# Patient Record
Sex: MALE | Race: WHITE | Employment: OTHER | URBAN - METROPOLITAN AREA
[De-identification: names, ages, dates, MRNs, and addresses within clinical notes are randomized per-mention and may not be internally consistent; named-entity substitution may affect disease eponyms.]

---

## 2017-05-17 ENCOUNTER — HOSPITAL ENCOUNTER (INPATIENT)
Age: 62
LOS: 6 days | Discharge: NURSING, REHAB OR LONG-TERM CARE | DRG: 603 | End: 2017-05-24
Attending: EMERGENCY MEDICINE | Admitting: HOSPITALIST
Payer: MEDICARE

## 2017-05-17 DIAGNOSIS — L03.116 CELLULITIS OF LEFT LEG: Primary | ICD-10-CM

## 2017-05-17 DIAGNOSIS — L03.311 CELLULITIS, ABDOMINAL WALL: ICD-10-CM

## 2017-05-17 DIAGNOSIS — L03.115 CELLULITIS OF RIGHT LEG: ICD-10-CM

## 2017-05-17 DIAGNOSIS — Z79.01 ANTICOAGULATED ON COUMADIN: ICD-10-CM

## 2017-05-17 DIAGNOSIS — R29.6 FREQUENT FALLS: ICD-10-CM

## 2017-05-17 DIAGNOSIS — I48.20 CHRONIC ATRIAL FIBRILLATION (HCC): ICD-10-CM

## 2017-05-17 PROCEDURE — 82550 ASSAY OF CK (CPK): CPT | Performed by: EMERGENCY MEDICINE

## 2017-05-17 PROCEDURE — 83880 ASSAY OF NATRIURETIC PEPTIDE: CPT | Performed by: EMERGENCY MEDICINE

## 2017-05-17 PROCEDURE — 51702 INSERT TEMP BLADDER CATH: CPT

## 2017-05-17 PROCEDURE — 99285 EMERGENCY DEPT VISIT HI MDM: CPT

## 2017-05-17 PROCEDURE — 85610 PROTHROMBIN TIME: CPT | Performed by: EMERGENCY MEDICINE

## 2017-05-17 PROCEDURE — 87040 BLOOD CULTURE FOR BACTERIA: CPT | Performed by: EMERGENCY MEDICINE

## 2017-05-17 PROCEDURE — 85025 COMPLETE CBC W/AUTO DIFF WBC: CPT | Performed by: EMERGENCY MEDICINE

## 2017-05-17 PROCEDURE — 96361 HYDRATE IV INFUSION ADD-ON: CPT

## 2017-05-17 PROCEDURE — 85730 THROMBOPLASTIN TIME PARTIAL: CPT | Performed by: EMERGENCY MEDICINE

## 2017-05-17 PROCEDURE — 96366 THER/PROPH/DIAG IV INF ADDON: CPT

## 2017-05-17 PROCEDURE — 83605 ASSAY OF LACTIC ACID: CPT

## 2017-05-17 PROCEDURE — 83735 ASSAY OF MAGNESIUM: CPT | Performed by: EMERGENCY MEDICINE

## 2017-05-17 PROCEDURE — 80053 COMPREHEN METABOLIC PANEL: CPT | Performed by: EMERGENCY MEDICINE

## 2017-05-17 PROCEDURE — 96365 THER/PROPH/DIAG IV INF INIT: CPT

## 2017-05-17 NOTE — IP AVS SNAPSHOT
Current Discharge Medication List  
  
START taking these medications Dose & Instructions Dispensing Information Comments Morning Noon Evening Bedtime  
 trimethoprim-sulfamethoxazole 160-800 mg per tablet Commonly known as:  BACTRIM DS Your last dose was: Your next dose is:    
   
   
 Dose:  1 Tab Take 1 Tab by mouth two (2) times a day. Quantity:  14 Tab Refills:  0 CONTINUE these medications which have CHANGED Dose & Instructions Dispensing Information Comments Morning Noon Evening Bedtime  
 warfarin 4 mg tablet Commonly known as:  COUMADIN What changed:   
- medication strength 
- how much to take Your last dose was: Your next dose is:    
   
   
 Dose:  8 mg Take 2 Tabs by mouth every other day. Quantity:  30 Tab Refills:  0 CONTINUE these medications which have NOT CHANGED Dose & Instructions Dispensing Information Comments Morning Noon Evening Bedtime  
 carvedilol 12.5 mg tablet Commonly known as:  Tilda Short Your last dose was: Your next dose is:    
   
   
 Dose:  12.5 mg Take 12.5 mg by mouth two (2) times daily (with meals). Refills:  0  
     
   
   
   
  
 furosemide 20 mg tablet Commonly known as:  LASIX Your last dose was: Your next dose is:    
   
   
 Dose:  20 mg Take 20 mg by mouth two (2) times a day. Refills:  0 Menthol-Zinc Oxide 0.44-20.6 % Oint Commonly known as:  Calmoseptine Your last dose was: Your next dose is:    
   
   
 Dose:  1 Each Apply 1 Each to affected area daily. Indications: SKIN IRRITATION Refills:  0  
     
   
   
   
  
 nitroglycerin 0.4 mg SL tablet Commonly known as:  NITROSTAT Your last dose was:     
   
Your next dose is:    
   
   
 Dose:  0.4 mg  
0.4 mg by SubLINGual route every five (5) minutes as needed for Chest Pain. Indications: ANGINA Refills:  0  
     
   
   
   
  
 oxyCODONE-acetaminophen 5-325 mg per tablet Commonly known as:  PERCOCET Your last dose was: Your next dose is:    
   
   
 Dose:  1-2 Tab Take 1-2 Tabs by mouth every four (4) hours as needed for Pain. Max Daily Amount: 12 Tabs. Indications: Pain Quantity:  20 Tab Refills:  0 STOP taking these medications CIPRO 500 mg tablet Generic drug:  ciprofloxacin HCl  
   
  
 lisinopril 5 mg tablet Commonly known as:  Tra Pina Where to Get Your Medications Information on where to get these meds will be given to you by the nurse or doctor. ! Ask your nurse or doctor about these medications  
  oxyCODONE-acetaminophen 5-325 mg per tablet  
 trimethoprim-sulfamethoxazole 160-800 mg per tablet  
 warfarin 4 mg tablet

## 2017-05-17 NOTE — IP AVS SNAPSHOT
[address] [place] [address] 
[phone] Patient: Rocael Callahan MRN: [id number] :1955 You are allergic to the following No active allergies Recent Documentation Height Weight BMI Smoking Status 1.803 m 127.9 kg 39.33 kg/m2 Never Smoker Emergency Contacts Name Discharge Info Relation Home Work Mobile Dayana Jones  Child [2]   [phone] About your hospitalization You were admitted on:  May 18, 2017 You last received care in the:  [facility] [address] You were discharged on:  May 24, 2017 Unit phone number:  [phone] Why you were hospitalized Your primary diagnosis was:  Not on File Your diagnoses also included:  Cellulitis Of Abdominal Wall, Cellulitis Of Both Lower Extremities Providers Seen During Your Hospitalizations Provider Role Specialty Primary office phone Fabio Palacios MD Attending Provider Emergency Medicine [phone] Nuha Martins MD Attending Provider [facility] [phone] Geanie Skiff, MD Attending Provider Internal Medicine [phone] Michele Schumacher MD Attending Provider Internal Medicine [phone] Your Primary Care Physician (PCP) Primary Care Physician Office Phone Office Fax UNKNOWN, PROVIDER ** None ** ** None ** Follow-up Information Follow up With Details Comments Contact Info Provider Unknown   Patient not available to ask Current Discharge Medication List  
  
START taking these medications Dose & Instructions Dispensing Information Comments Morning Noon Evening Bedtime  
 trimethoprim-sulfamethoxazole 160-800 mg per tablet Commonly known as:  BACTRIM DS Your last dose was: Your next dose is:    
   
   
 Dose:  1 Tab Take 1 Tab by mouth two (2) times a day. Quantity:  14 Tab Refills:  0 CONTINUE these medications which have CHANGED Dose & Instructions Dispensing Information Comments Morning Noon Evening Bedtime  
 warfarin 4 mg tablet Commonly known as:  COUMADIN What changed:   
- medication strength 
- how much to take Your last dose was: Your next dose is:    
   
   
 Dose:  8 mg Take 2 Tabs by mouth every other day. Quantity:  30 Tab Refills:  0 CONTINUE these medications which have NOT CHANGED Dose & Instructions Dispensing Information Comments Morning Noon Evening Bedtime  
 carvedilol 12.5 mg tablet Commonly known as:  Dee Hernándezn Your last dose was: Your next dose is:    
   
   
 Dose:  12.5 mg Take 12.5 mg by mouth two (2) times daily (with meals). Refills:  0  
     
   
   
   
  
 furosemide 20 mg tablet Commonly known as:  LASIX Your last dose was: Your next dose is:    
   
   
 Dose:  20 mg Take 20 mg by mouth two (2) times a day. Refills:  0 Menthol-Zinc Oxide 0.44-20.6 % Oint Commonly known as:  Calmoseptine Your last dose was: Your next dose is:    
   
   
 Dose:  1 Each Apply 1 Each to affected area daily. Indications: SKIN IRRITATION Refills:  0  
     
   
   
   
  
 nitroglycerin 0.4 mg SL tablet Commonly known as:  NITROSTAT Your last dose was: Your next dose is:    
   
   
 Dose:  0.4 mg  
0.4 mg by SubLINGual route every five (5) minutes as needed for Chest Pain. Indications: ANGINA Refills:  0  
     
   
   
   
  
 oxyCODONE-acetaminophen 5-325 mg per tablet Commonly known as:  PERCOCET Your last dose was: Your next dose is:    
   
   
 Dose:  1-2 Tab Take 1-2 Tabs by mouth every four (4) hours as needed for Pain. Max Daily Amount: 12 Tabs. Indications: Pain Quantity:  20 Tab Refills:  0 STOP taking these medications CIPRO 500 mg tablet Generic drug:  ciprofloxacin HCl  
   
  
 lisinopril 5 mg tablet Commonly known as:  Camron Amber Where to Get Your Medications Information on where to get these meds will be given to you by the nurse or doctor. ! Ask your nurse or doctor about these medications  
  oxyCODONE-acetaminophen 5-325 mg per tablet  
 trimethoprim-sulfamethoxazole 160-800 mg per tablet  
 warfarin 4 mg tablet Discharge Instructions DISCHARGE SUMMARY from Nurse The following personal items are in your possession at time of discharge: 
 
Dental Appliances: None Visual Aid: None Home Medications: Sent to pharmacy Jewelry: None Clothing: Pants, Jacket/Coat, Shirt, Socks Other Valuables: [employer] (2 wallets) Personal Items Sent to Safe: 2 wallets, keys PATIENT INSTRUCTIONS: 
 
 
F-face looks uneven A-arms unable to move or move unevenly S-speech slurred or non-existent T-time-call 911 as soon as signs and symptoms begin-DO NOT go Back to bed or wait to see if you get better-TIME IS BRAIN. Warning Signs of HEART ATTACK Call 911 if you have these symptoms: 
? Chest discomfort. Most heart attacks involve discomfort in the center of the chest that lasts more than a few minutes, or that goes away and comes back. It can feel like uncomfortable pressure, squeezing, fullness, or pain. ? Discomfort in other areas of the upper body. Symptoms can include pain or discomfort in one or both arms, the back, neck, jaw, or stomach. ? Shortness of breath with or without chest discomfort. ? Other signs may include breaking out in a cold sweat, nausea, or lightheadedness. Don't wait more than five minutes to call [address]! Fast action can save your life. Calling 911 is almost always the fastest way to get lifesaving treatment. Emergency Medical Services staff can begin treatment when they arrive  up to an hour sooner than if someone gets to the hospital by car. The discharge information has been reviewed with the patient. The patient Atrial Fibrillation: Care Instructions Your Care Instructions Atrial fibrillation is an irregular and often fast heartbeat. Treating this condition is important for several reasons. It can cause blood clots, which can travel from your heart to your brain and cause a stroke. If you have a fast heartbeat, you may feel lightheaded, dizzy, and weak. An irregular heartbeat can also increase your risk for heart failure. Atrial fibrillation is often the result of another heart condition, such as high blood pressure or coronary artery disease. Making changes to improve your heart condition will help you stay healthy and active. Follow-up care is a key part of your treatment and safety. Be sure to make and go to all appointments, and call your doctor if you are having problems. It's also a good idea to know your test results and keep a list of the medicines you take. How can you care for yourself at home? Medicines · Take your medicines exactly as prescribed. Call your doctor if you think you are having a problem with your medicine. You will get more details on the specific medicines your doctor prescribes. · If your doctor has given you a blood thinner to prevent a stroke, be sure you get instructions about how to take your medicine safely. Blood thinners can cause serious bleeding problems. · Do not take any vitamins, over-the-counter drugs, or herbal products without talking to your doctor first. 
Lifestyle changes · Do not smoke.  Smoking can increase your chance of a stroke and heart attack. If you need help quitting, talk to your doctor about stop-smoking programs and medicines. These can increase your chances of quitting for good. · Eat a heart-healthy diet. · Stay at a healthy weight. Lose weight if you need to. · Limit alcohol to 2 drinks a day for men and 1 drink a day for women. Too much alcohol can cause health problems. · Avoid colds and flu. Get a pneumococcal vaccine shot. If you have had one before, ask your doctor whether you need another dose. Get a flu shot every year. If you must be around people with colds or flu, wash your hands often. Activity · If your doctor recommends it, get more exercise. Walking is a good choice. Bit by bit, increase the amount you walk every day. Try for at least 30 minutes on most days of the week. You also may want to swim, bike, or do other activities. Your doctor may suggest that you join a cardiac rehabilitation program so that you can have help increasing your physical activity safely. · Start light exercise if your doctor says it is okay. Even a small amount will help you get stronger, have more energy, and manage stress. Walking is an easy way to get exercise. Start out by walking a little more than you did in the hospital. Gradually increase the amount you walk. · When you exercise, watch for signs that your heart is working too hard. You are pushing too hard if you cannot talk while you are exercising. If you become short of breath or dizzy or have chest pain, sit down and rest immediately. · Check your pulse regularly. Place two fingers on the artery at the palm side of your wrist, in line with your thumb. If your heartbeat seems uneven or fast, talk to your doctor. When should you call for help? Call 911 anytime you think you may need emergency care. For example, call if: 
· You have symptoms of a heart attack. These may include: ¨ Chest pain or pressure, or a strange feeling in the chest. 
¨ Sweating. ¨ Shortness of breath. ¨ Nausea or vomiting. ¨ Pain, pressure, or a strange feeling in the back, neck, jaw, or upper belly or in one or both shoulders or arms. ¨ Lightheadedness or sudden weakness. ¨ A fast or irregular heartbeat. After you call 911, the  may tell you to chew 1 adult-strength or 2 to 4 low-dose aspirin. Wait for an ambulance. Do not try to drive yourself. · You have symptoms of a stroke. These may include: 
¨ Sudden numbness, tingling, weakness, or loss of movement in your face, arm, or leg, especially on only one side of your body. ¨ Sudden vision changes. ¨ Sudden trouble speaking. ¨ Sudden confusion or trouble understanding simple statements. ¨ Sudden problems with walking or balance. ¨ A sudden, severe headache that is different from past headaches. · You passed out (lost consciousness). Call your doctor now or seek immediate medical care if: 
· You have new or increased shortness of breath. · You feel dizzy or lightheaded, or you feel like you may faint. · Your heart rate becomes irregular. · You can feel your heart flutter in your chest or skip heartbeats. Tell your doctor if these symptoms are new or worse. Watch closely for changes in your health, and be sure to contact your doctor if you have any problems. Where can you learn more? Go to http://melyssa-sudha.info/. Enter U020 in the search box to learn more about \"Atrial Fibrillation: Care Instructions. \" Current as of: January 27, 2016 Content Version: 11.2 © 5006-9571 [employer]. Care instructions adapted under license by [employer] (which disclaims liability or warranty for this information). If you have questions about a medical condition or this instruction, always ask your healthcare professional. [place] [address] any warranty or liability for your use of this information. verbalized understanding.  
 
Discharge medications reviewed with the patient and appropriate educational materials and side effects teaching were provided. Discharge Orders None Knovel Announcement We are excited to announce that we are making your provider's discharge notes available to you in Knovel. You will see these notes when they are completed and signed by the physician that discharged you from your recent hospital stay. If you have any questions or concerns about any information you see in Knovel, please call the Health Information Department where you were seen or reach out to your Primary Care Provider for more information about your plan of care. Introducing [facility]! Ama Pérez introduces Knovel patient portal. Now you can access parts of your medical record, email your doctor's office, and request medication refills online. 1. In your internet browser, go to [email or web address] 2. Click on the First Time User? Click Here link in the Sign In box. You will see the New Member Sign Up page. 3. Enter your Knovel Access Code exactly as it appears below. You will not need to use this code after youve completed the sign-up process. If you do not sign up before the expiration date, you must request a new code. · Knovel Access Code: [email or web address] Expires: 8/22/2017  2:18 PM 
 
4. Enter the last four digits of your Social Security Number (xxxx) and Date of Birth (mm/dd/yyyy) as indicated and click Submit. You will be taken to the next sign-up page. 5. Create a Knovel ID. This will be your Knovel login ID and cannot be changed, so think of one that is secure and easy to remember. 6. Create a Knovel password. You can change your password at any time. 7. Enter your Password Reset Question and Answer. This can be used at a later time if you forget your password. 8. Enter your e-mail address. You will receive e-mail notification when new information is available in [address] 9. Click Sign Up. You can now view and download portions of your medical record. 10. Click the Download Summary menu link to download a portable copy of your medical information. If you have questions, please visit the Frequently Asked Questions section of the [employer] website. Remember, [employer] is NOT to be used for urgent needs. For medical emergencies, dial 911. Now available from your iPhone and Android! General Information Please provide this summary of care documentation to your next provider. Patient Signature:  ____________________________________________________________ Date:  ____________________________________________________________  
  
Christina Burn Provider Signature:  ____________________________________________________________ Date:  ____________________________________________________________

## 2017-05-17 NOTE — IP AVS SNAPSHOT
Summary of Care Report The Summary of Care report has been created to help improve care coordination. Users with access to [employer] or [address] (Web-based application) may access additional patient information including the Discharge Summary. If you are not currently a [address] user and need more information, please call the number listed below in the [place] [address] section and ask to be connected with Medical Records. Facility Information Name Address Phone [facility] [address] [facility] [address] [id number] [phone] Patient Information Patient Name Sex SELMA Condon ([id number]) Male 1955 Discharge Information Admitting Provider Service Area Unit Cortes Rushing MD / [address] Cardiac [facility] / [phone] Discharge Provider Discharge Date/Time Discharge Disposition Destination (none) 2017 (Pending) SNF (none) Patient Language Language ENGLISH [13] Hospital Problems as of 2017  Never Reviewed Class Noted - Resolved Last Modified POA Active Problems Cellulitis of abdominal wall  2017 - Present 2017 by Cortes Rushing MD Unknown Entered by Corets Rushing MD  
  Cellulitis of both lower extremities  2017 - Present 2017 by Cortes Rushing MD Unknown Entered by Cortes Rushing MD  
  
You are allergic to the following No active allergies Current Discharge Medication List  
  
START taking these medications Dose & Instructions Dispensing Information Comments  
 trimethoprim-sulfamethoxazole 160-800 mg per tablet Commonly known as:  BACTRIM DS Dose:  1 Tab Take 1 Tab by mouth two (2) times a day. Quantity:  14 Tab Refills:  0 CONTINUE these medications which have CHANGED Dose & Instructions Dispensing Information Comments  
 warfarin 4 mg tablet Commonly known as:  COUMADIN What changed:   
- medication strength 
- how much to take Dose:  8 mg Take 2 Tabs by mouth every other day. Quantity:  30 Tab Refills:  0 CONTINUE these medications which have NOT CHANGED Dose & Instructions Dispensing Information Comments  
 carvedilol 12.5 mg tablet Commonly known as:  Moon [place] Dose:  12.5 mg Take 12.5 mg by mouth two (2) times daily (with meals). Refills:  0  
   
 furosemide 20 mg tablet Commonly known as:  LASIX Dose:  20 mg Take 20 mg by mouth two (2) times a day. Refills:  0 Menthol-Zinc Oxide 0.44-20.6 % Oint Commonly known as:  Calmoseptine Dose:  1 Each Apply 1 Each to affected area daily. Indications: SKIN IRRITATION Refills:  0  
   
 nitroglycerin 0.4 mg SL tablet Commonly known as:  NITROSTAT Dose:  0.4 mg  
0.4 mg by SubLINGual route every five (5) minutes as needed for Chest Pain. Indications: ANGINA Refills:  0  
   
 oxyCODONE-acetaminophen 5-325 mg per tablet Commonly known as:  PERCOCET Dose:  1-2 Tab Take 1-2 Tabs by mouth every four (4) hours as needed for Pain. Max Daily Amount: 12 Tabs. Indications: Pain Quantity:  20 Tab Refills:  0 STOP taking these medications Comments CIPRO 500 mg tablet Generic drug:  ciprofloxacin HCl  
   
   
 lisinopril 5 mg tablet Commonly known as:  Davon Headings Follow-up Information Follow up With Details Comments Contact Info Provider Unknown   Patient not available to ask Discharge Instructions DISCHARGE SUMMARY from Nurse The following personal items are in your possession at time of discharge: 
 
Dental Appliances: None Visual Aid: None Home Medications: Sent to pharmacy Jewelry: None Clothing: Pants, Jacket/Coat, Shirt, Socks Other Valuables: [address] (2 wallets) Personal Items Sent to Safe: 2 wallets, keys PATIENT INSTRUCTIONS: 
 
 
F-face looks uneven A-arms unable to move or move unevenly S-speech slurred or non-existent T-time-call 911 as soon as signs and symptoms begin-DO NOT go Back to bed or wait to see if you get better-TIME IS BRAIN. Warning Signs of HEART ATTACK Call 911 if you have these symptoms: 
? Chest discomfort. Most heart attacks involve discomfort in the center of the chest that lasts more than a few minutes, or that goes away and comes back. It can feel like uncomfortable pressure, squeezing, fullness, or pain. ? Discomfort in other areas of the upper body. Symptoms can include pain or discomfort in one or both arms, the back, neck, jaw, or stomach. ? Shortness of breath with or without chest discomfort. ? Other signs may include breaking out in a cold sweat, nausea, or lightheadedness. Don't wait more than five minutes to call [address]! Fast action can save your life. Calling 911 is almost always the fastest way to get lifesaving treatment. Emergency Medical Services staff can begin treatment when they arrive  up to an hour sooner than if someone gets to the hospital by car. The discharge information has been reviewed with the patient. The patient Atrial Fibrillation: Care Instructions Your Care Instructions Atrial fibrillation is an irregular and often fast heartbeat. Treating this condition is important for several reasons. It can cause blood clots, which can travel from your heart to your brain and cause a stroke. If you have a fast heartbeat, you may feel lightheaded, dizzy, and weak. An irregular heartbeat can also increase your risk for heart failure. Atrial fibrillation is often the result of another heart condition, such as high blood pressure or coronary artery disease. Making changes to improve your heart condition will help you stay healthy and active. Follow-up care is a key part of your treatment and safety. Be sure to make and go to all appointments, and call your doctor if you are having problems. It's also a good idea to know your test results and keep a list of the medicines you take. How can you care for yourself at home? Medicines · Take your medicines exactly as prescribed. Call your doctor if you think you are having a problem with your medicine. You will get more details on the specific medicines your doctor prescribes. · If your doctor has given you a blood thinner to prevent a stroke, be sure you get instructions about how to take your medicine safely. Blood thinners can cause serious bleeding problems. · Do not take any vitamins, over-the-counter drugs, or herbal products without talking to your doctor first. 
Lifestyle changes · Do not smoke. Smoking can increase your chance of a stroke and heart attack. If you need help quitting, talk to your doctor about stop-smoking programs and medicines. These can increase your chances of quitting for good. · Eat a heart-healthy diet. · Stay at a healthy weight. Lose weight if you need to. · Limit alcohol to 2 drinks a day for men and 1 drink a day for women. Too much alcohol can cause health problems. · Avoid colds and flu. Get a pneumococcal vaccine shot. If you have had one before, ask your doctor whether you need another dose. Get a flu shot every year. If you must be around people with colds or flu, wash your hands often. Activity · If your doctor recommends it, get more exercise. Walking is a good choice. Bit by bit, increase the amount you walk every day. Try for at least 30 minutes on most days of the week.  You also may want to swim, bike, or do other activities. Your doctor may suggest that you join a cardiac rehabilitation program so that you can have help increasing your physical activity safely. · Start light exercise if your doctor says it is okay. Even a small amount will help you get stronger, have more energy, and manage stress. Walking is an easy way to get exercise. Start out by walking a little more than you did in the hospital. Gradually increase the amount you walk. · When you exercise, watch for signs that your heart is working too hard. You are pushing too hard if you cannot talk while you are exercising. If you become short of breath or dizzy or have chest pain, sit down and rest immediately. · Check your pulse regularly. Place two fingers on the artery at the palm side of your wrist, in line with your thumb. If your heartbeat seems uneven or fast, talk to your doctor. When should you call for help? Call 911 anytime you think you may need emergency care. For example, call if: 
· You have symptoms of a heart attack. These may include: ¨ Chest pain or pressure, or a strange feeling in the chest. 
¨ Sweating. ¨ Shortness of breath. ¨ Nausea or vomiting. ¨ Pain, pressure, or a strange feeling in the back, neck, jaw, or upper belly or in one or both shoulders or arms. ¨ Lightheadedness or sudden weakness. ¨ A fast or irregular heartbeat. After you call 911, the  may tell you to chew 1 adult-strength or 2 to 4 low-dose aspirin. Wait for an ambulance. Do not try to drive yourself. · You have symptoms of a stroke. These may include: 
¨ Sudden numbness, tingling, weakness, or loss of movement in your face, arm, or leg, especially on only one side of your body. ¨ Sudden vision changes. ¨ Sudden trouble speaking. ¨ Sudden confusion or trouble understanding simple statements. ¨ Sudden problems with walking or balance. ¨ A sudden, severe headache that is different from past headaches. · You passed out (lost consciousness). Call your doctor now or seek immediate medical care if: 
· You have new or increased shortness of breath. · You feel dizzy or lightheaded, or you feel like you may faint. · Your heart rate becomes irregular. · You can feel your heart flutter in your chest or skip heartbeats. Tell your doctor if these symptoms are new or worse. Watch closely for changes in your health, and be sure to contact your doctor if you have any problems. Where can you learn more? Go to http://melyssa-sudha.info/. Enter U020 in the search box to learn more about \"Atrial Fibrillation: Care Instructions. \" Current as of: January 27, 2016 Content Version: 11.2 © 9018-0402 [employer]. Care instructions adapted under license by [employer] (which disclaims liability or warranty for this information). If you have questions about a medical condition or this instruction, always ask your healthcare professional. [place] [address] any warranty or liability for your use of this information. verbalized understanding. Discharge medications reviewed with the patient and appropriate educational materials and side effects teaching were provided. Chart Review Routing History No Routing History on File

## 2017-05-18 ENCOUNTER — APPOINTMENT (OUTPATIENT)
Dept: GENERAL RADIOLOGY | Age: 62
DRG: 603 | End: 2017-05-18
Attending: EMERGENCY MEDICINE
Payer: MEDICARE

## 2017-05-18 ENCOUNTER — APPOINTMENT (OUTPATIENT)
Dept: CT IMAGING | Age: 62
DRG: 603 | End: 2017-05-18
Attending: EMERGENCY MEDICINE
Payer: MEDICARE

## 2017-05-18 PROBLEM — L03.116 CELLULITIS OF BOTH LOWER EXTREMITIES: Status: ACTIVE | Noted: 2017-05-18

## 2017-05-18 PROBLEM — L03.115 CELLULITIS OF BOTH LOWER EXTREMITIES: Status: ACTIVE | Noted: 2017-05-18

## 2017-05-18 PROBLEM — L03.311 CELLULITIS OF ABDOMINAL WALL: Status: ACTIVE | Noted: 2017-05-18

## 2017-05-18 LAB
ALBUMIN SERPL BCP-MCNC: 2.4 G/DL (ref 3.4–5)
ALBUMIN/GLOB SERPL: 0.6 {RATIO} (ref 0.8–1.7)
ALP SERPL-CCNC: 76 U/L (ref 45–117)
ALT SERPL-CCNC: 18 U/L (ref 16–61)
ANION GAP BLD CALC-SCNC: 12 MMOL/L (ref 3–18)
APPEARANCE UR: CLEAR
APTT PPP: 81.2 SEC (ref 23–36.4)
AST SERPL W P-5'-P-CCNC: 27 U/L (ref 15–37)
ATRIAL RATE: 82 BPM
BASOPHILS # BLD AUTO: 0.1 K/UL (ref 0–0.06)
BASOPHILS # BLD: 0 % (ref 0–2)
BILIRUB SERPL-MCNC: 1.1 MG/DL (ref 0.2–1)
BILIRUB UR QL: NEGATIVE
BNP SERPL-MCNC: 2843 PG/ML (ref 0–900)
BUN SERPL-MCNC: 14 MG/DL (ref 7–18)
BUN/CREAT SERPL: 12 (ref 12–20)
CALCIUM SERPL-MCNC: 8 MG/DL (ref 8.5–10.1)
CALCULATED R AXIS, ECG10: -121 DEGREES
CALCULATED T AXIS, ECG11: 63 DEGREES
CHLORIDE SERPL-SCNC: 91 MMOL/L (ref 100–108)
CK MB CFR SERPL CALC: 2 % (ref 0–4)
CK MB SERPL-MCNC: 3.9 NG/ML (ref 5–25)
CK SERPL-CCNC: 192 U/L (ref 39–308)
CO2 SERPL-SCNC: 21 MMOL/L (ref 21–32)
COLOR UR: YELLOW
CREAT SERPL-MCNC: 1.18 MG/DL (ref 0.6–1.3)
DIAGNOSIS, 93000: NORMAL
DIFFERENTIAL METHOD BLD: ABNORMAL
EOSINOPHIL # BLD: 0.5 K/UL (ref 0–0.4)
EOSINOPHIL NFR BLD: 4 % (ref 0–5)
ERYTHROCYTE [DISTWIDTH] IN BLOOD BY AUTOMATED COUNT: 13.1 % (ref 11.6–14.5)
GLOBULIN SER CALC-MCNC: 3.7 G/DL (ref 2–4)
GLUCOSE BLD STRIP.AUTO-MCNC: 74 MG/DL (ref 70–110)
GLUCOSE BLD STRIP.AUTO-MCNC: 80 MG/DL (ref 70–110)
GLUCOSE SERPL-MCNC: 67 MG/DL (ref 74–99)
GLUCOSE UR STRIP.AUTO-MCNC: NEGATIVE MG/DL
HCT VFR BLD AUTO: 36.1 % (ref 36–48)
HGB BLD-MCNC: 12.7 G/DL (ref 13–16)
HGB UR QL STRIP: NEGATIVE
INR PPP: 3.7 (ref 0.8–1.2)
INR PPP: 3.9 (ref 0.8–1.2)
KETONES UR QL STRIP.AUTO: NEGATIVE MG/DL
LACTATE BLD-SCNC: 1.4 MMOL/L (ref 0.4–2)
LEUKOCYTE ESTERASE UR QL STRIP.AUTO: NEGATIVE
LYMPHOCYTES # BLD AUTO: 17 % (ref 21–52)
LYMPHOCYTES # BLD: 2 K/UL (ref 0.9–3.6)
MAGNESIUM SERPL-MCNC: 1.9 MG/DL (ref 1.6–2.6)
MCH RBC QN AUTO: 31.4 PG (ref 24–34)
MCHC RBC AUTO-ENTMCNC: 35.2 G/DL (ref 31–37)
MCV RBC AUTO: 89.1 FL (ref 74–97)
MONOCYTES # BLD: 0.5 K/UL (ref 0.05–1.2)
MONOCYTES NFR BLD AUTO: 5 % (ref 3–10)
NEUTS SEG # BLD: 8.6 K/UL (ref 1.8–8)
NEUTS SEG NFR BLD AUTO: 74 % (ref 40–73)
NITRITE UR QL STRIP.AUTO: NEGATIVE
PH UR STRIP: 5 [PH] (ref 5–8)
PLATELET # BLD AUTO: 392 K/UL (ref 135–420)
PMV BLD AUTO: 9.1 FL (ref 9.2–11.8)
POTASSIUM SERPL-SCNC: 4.8 MMOL/L (ref 3.5–5.5)
PROT SERPL-MCNC: 6.1 G/DL (ref 6.4–8.2)
PROT UR STRIP-MCNC: NEGATIVE MG/DL
PROTHROMBIN TIME: 34.6 SEC (ref 11.5–15.2)
PROTHROMBIN TIME: 35.9 SEC (ref 11.5–15.2)
Q-T INTERVAL, ECG07: 454 MS
QRS DURATION, ECG06: 154 MS
QTC CALCULATION (BEZET), ECG08: 527 MS
RBC # BLD AUTO: 4.05 M/UL (ref 4.7–5.5)
SODIUM SERPL-SCNC: 124 MMOL/L (ref 136–145)
SP GR UR REFRACTOMETRY: 1.01 (ref 1–1.03)
TROPONIN I SERPL-MCNC: <0.02 NG/ML (ref 0–0.04)
UROBILINOGEN UR QL STRIP.AUTO: 1 EU/DL (ref 0.2–1)
VENTRICULAR RATE, ECG03: 81 BPM
WBC # BLD AUTO: 11.6 K/UL (ref 4.6–13.2)

## 2017-05-18 PROCEDURE — 74011250637 HC RX REV CODE- 250/637: Performed by: EMERGENCY MEDICINE

## 2017-05-18 PROCEDURE — 81003 URINALYSIS AUTO W/O SCOPE: CPT | Performed by: EMERGENCY MEDICINE

## 2017-05-18 PROCEDURE — 70450 CT HEAD/BRAIN W/O DYE: CPT

## 2017-05-18 PROCEDURE — 93005 ELECTROCARDIOGRAM TRACING: CPT

## 2017-05-18 PROCEDURE — 74011250637 HC RX REV CODE- 250/637: Performed by: HOSPITALIST

## 2017-05-18 PROCEDURE — 87086 URINE CULTURE/COLONY COUNT: CPT | Performed by: EMERGENCY MEDICINE

## 2017-05-18 PROCEDURE — 71010 XR CHEST PORT: CPT

## 2017-05-18 PROCEDURE — 77030018836 HC SOL IRR NACL ICUM -A

## 2017-05-18 PROCEDURE — 74011000258 HC RX REV CODE- 258: Performed by: EMERGENCY MEDICINE

## 2017-05-18 PROCEDURE — 74011250636 HC RX REV CODE- 250/636: Performed by: EMERGENCY MEDICINE

## 2017-05-18 PROCEDURE — 77030020263 HC SOL INJ SOD CL0.9% LFCR 1000ML

## 2017-05-18 PROCEDURE — 36415 COLL VENOUS BLD VENIPUNCTURE: CPT | Performed by: HOSPITALIST

## 2017-05-18 PROCEDURE — 77030034850

## 2017-05-18 PROCEDURE — 74011250636 HC RX REV CODE- 250/636: Performed by: HOSPITALIST

## 2017-05-18 PROCEDURE — 82962 GLUCOSE BLOOD TEST: CPT

## 2017-05-18 PROCEDURE — 65660000000 HC RM CCU STEPDOWN

## 2017-05-18 RX ORDER — VANCOMYCIN HYDROCHLORIDE 1 G/20ML
INJECTION, POWDER, LYOPHILIZED, FOR SOLUTION INTRAVENOUS
Status: DISPENSED
Start: 2017-05-18 | End: 2017-05-18

## 2017-05-18 RX ORDER — ACETAMINOPHEN 325 MG/1
650 TABLET ORAL
Status: DISCONTINUED | OUTPATIENT
Start: 2017-05-18 | End: 2017-05-24 | Stop reason: HOSPADM

## 2017-05-18 RX ORDER — SODIUM CHLORIDE 0.9 % (FLUSH) 0.9 %
5-10 SYRINGE (ML) INJECTION AS NEEDED
Status: DISCONTINUED | OUTPATIENT
Start: 2017-05-18 | End: 2017-05-24 | Stop reason: HOSPADM

## 2017-05-18 RX ORDER — SODIUM CHLORIDE 900 MG/100ML
INJECTION INTRAVENOUS
Status: DISPENSED
Start: 2017-05-18 | End: 2017-05-18

## 2017-05-18 RX ORDER — WARFARIN 7.5 MG/1
7.5 TABLET ORAL EVERY OTHER DAY
COMMUNITY
End: 2017-05-24

## 2017-05-18 RX ORDER — SODIUM CHLORIDE 9 MG/ML
50 INJECTION, SOLUTION INTRAVENOUS CONTINUOUS
Status: DISCONTINUED | OUTPATIENT
Start: 2017-05-18 | End: 2017-05-23

## 2017-05-18 RX ORDER — SODIUM CHLORIDE 0.9 % (FLUSH) 0.9 %
5-10 SYRINGE (ML) INJECTION EVERY 8 HOURS
Status: DISCONTINUED | OUTPATIENT
Start: 2017-05-18 | End: 2017-05-24 | Stop reason: HOSPADM

## 2017-05-18 RX ORDER — HYDROCODONE BITARTRATE AND ACETAMINOPHEN 5; 325 MG/1; MG/1
1 TABLET ORAL
Status: COMPLETED | OUTPATIENT
Start: 2017-05-18 | End: 2017-05-18

## 2017-05-18 RX ADMIN — SODIUM CHLORIDE 500 ML: 900 INJECTION, SOLUTION INTRAVENOUS at 04:08

## 2017-05-18 RX ADMIN — SODIUM CHLORIDE 500 ML: 900 INJECTION, SOLUTION INTRAVENOUS at 00:09

## 2017-05-18 RX ADMIN — SODIUM CHLORIDE 1000 MG: 900 INJECTION, SOLUTION INTRAVENOUS at 04:43

## 2017-05-18 RX ADMIN — ACETAMINOPHEN 650 MG: 325 TABLET, FILM COATED ORAL at 22:44

## 2017-05-18 RX ADMIN — SODIUM CHLORIDE 1000 MG: 900 INJECTION, SOLUTION INTRAVENOUS at 00:48

## 2017-05-18 RX ADMIN — Medication 10 ML: at 06:00

## 2017-05-18 RX ADMIN — HYDROCODONE BITARTRATE AND ACETAMINOPHEN 1 TABLET: 5; 325 TABLET ORAL at 04:08

## 2017-05-18 RX ADMIN — SODIUM CHLORIDE 75 ML/HR: 900 INJECTION, SOLUTION INTRAVENOUS at 08:32

## 2017-05-18 RX ADMIN — SODIUM CHLORIDE 1500 MG: 900 INJECTION, SOLUTION INTRAVENOUS at 17:46

## 2017-05-18 RX ADMIN — Medication 10 ML: at 14:00

## 2017-05-18 RX ADMIN — CEFTRIAXONE 1 G: 1 INJECTION, POWDER, FOR SOLUTION INTRAMUSCULAR; INTRAVENOUS at 01:32

## 2017-05-18 NOTE — ED NOTES
Bilateral lower extremities with erythema and random irregular shaped open areas from toes to mid thigh. Worse on left. 4+ edema to LLE. BLE are weeping. Dressings removed and skin is flaking with callouses and friable skin noted BLE. Cleansed with NSS and moist NS kerlix wrapped bilaterally.

## 2017-05-18 NOTE — ED TRIAGE NOTES
Patient reports 2 day onset increasing generalized weakness with frequent falls. Reports injury to elbows and reports that he hit his head. States recent INR of 4.8.

## 2017-05-18 NOTE — PROGRESS NOTES
0645-Received pt from the ER, 4 person assist transfer to bed, on room air, no complaints of pain currently, cellulitis and weeping noted on bilateral lower extremities, excoriation and redness to sacrum, abdominal folds and thighs. Bloody urine noted on diaz catheter, 350 ml emptied. Patient oriented to room, call bell within reach. Bedside and Verbal shift change report given to [address] [place] [address] Road,[address] (oncoming nurse) by Santy Bojorquez RN (offgoing nurse). Report included the following information SBAR, Kardex, Intake/Output, MAR and Recent Results.

## 2017-05-18 NOTE — PROGRESS NOTES
Attempted twice to see  Patient to complete evaluation. First time  Was asleep and would not wake up  Second time music therapy and nursing reported decreased blood pressure most of the day and lethargic will attempt to see tomorrow.

## 2017-05-18 NOTE — ACP (ADVANCE CARE PLANNING)
Patient has designated __his daughter______________________ to participate in his/her discharge plan and to receive any needed information.      Name: Nila Kennedy  Address:  Phone number:  [phone]

## 2017-05-18 NOTE — PROGRESS NOTES
conducted an initial consultation and Spiritual Assessment for Park Irwin, who is a 64 y.o.,male. Patients Primary Language is: [place]. According to the patients EMR Spiritism Affiliation is: Gnosticism. The reason the Patient came to the hospital is:   Patient Active Problem List    Diagnosis Date Noted    Cellulitis of abdominal wall 05/18/2017    Cellulitis of both lower extremities 05/18/2017        The  provided the following Interventions:  Initiated a relationship of care and support. Explored issues of janay, belief, spirituality and Rastafarian/ritual needs while hospitalized. Listened empathically. Provided chaplaincy education. Provided information about Spiritual Care Services. Offered prayer and assurance of continued prayers on patient's behalf. Chart reviewed. The following outcomes were achieved:  Patient shared limited information about both their medical narrative and spiritual journey/beliefs. Patient processed feeling about current hospitalization. Patient expressed gratitude for the 's visit. Assessment:  Patient does not have any Rastafarian/cultural needs that will affect patients preferences in health care. Patient did not indicate any spiritual or Rastafarian issues which require Spiritual Care Services interventions at this time. Plan:  Chaplains will continue to follow and will provide pastoral care on an as needed/requested basis.  recommends bedside caregivers page  on duty if patient shows signs of acute spiritual or emotional distress.     [address] [facility]   Staff [address] [facility]   [phone]

## 2017-05-18 NOTE — ED PROVIDER NOTES
HPI Comments: Rosanna Clinton is a 64 y.o. Male with h/o afib, cardiomyopathy with c/o fall x 3 today hitting head once, due to his left leg giving out on him. No loc. On coumadin for afib with inr of 4 on Monday with dose adjustment by doctor. Also placed on abx for left leg infection by wound care doctor. No current pain. Chronic sob. No current cp. Also noted brb when he wipes. The history is provided by the patient and medical records. Past Medical History:   Diagnosis Date    CAD (coronary artery disease)     Heart failure ([address]     Hypertension        Past Surgical History:   Procedure Laterality Date    HX PACEMAKER           History reviewed. No pertinent family history. Social History     Social History    Marital status: LEGALLY      Spouse name: N/A    Number of children: N/A    Years of education: N/A     Occupational History    Not on file. Social History Main Topics    Smoking status: Never Smoker    Smokeless tobacco: Not on file    Alcohol use Yes    Drug use: No    Sexual activity: Not on file     Other Topics Concern    Not on file     Social History Narrative    No narrative on file         ALLERGIES: Review of patient's allergies indicates no known allergies. Review of Systems   Constitutional: Negative for fever. HENT: Negative for sore throat and trouble swallowing. Eyes: Negative for visual disturbance. Respiratory: Positive for shortness of breath. Cardiovascular: Positive for leg swelling. Gastrointestinal: Positive for blood in stool. Negative for abdominal pain. Endocrine: Negative for polyuria. Genitourinary: Negative for hematuria. Musculoskeletal: Positive for gait problem and myalgias. Skin: Positive for rash and wound. Neurological: Positive for light-headedness. Hematological: Bruises/bleeds easily. Psychiatric/Behavioral: Positive for sleep disturbance.    All other systems reviewed and are negative. Vitals:    05/18/17 0023 05/18/17 0030 05/18/17 0130 05/18/17 0201   BP:  (!) 111/98 104/60    Pulse:  74 74    Resp:  17 26    Temp:    97.8 °F (36.6 °C)   SpO2:  98% 98%    Weight: 127 kg (280 lb)      Height: 5' 11\" (1.803 m)               Physical Exam   Constitutional: He is oriented to person, place, and time. Non-toxic appearance. He does not appear ill. No distress. Morbidly obese     HENT:   Head: Normocephalic and atraumatic. Head is without raccoon's eyes, without Milner's sign, without abrasion and without contusion. Right Ear: External ear normal.   Left Ear: External ear normal.   Nose: Nose normal.   Mouth/Throat: Oropharynx is clear and moist. No oropharyngeal exudate. Eyes: Conjunctivae and EOM are normal. Pupils are equal, round, and reactive to light. Neck: Normal range of motion. Cardiovascular: Normal rate and intact distal pulses. An irregularly irregular rhythm present. Murmur heard. Pulmonary/Chest: Effort normal. No respiratory distress. He has rales (bases). Abdominal: Soft. There is tenderness. Musculoskeletal: Normal range of motion. He exhibits edema. Neurological: He is alert and oriented to person, place, and time. Skin: Skin is warm and dry. Rash noted. He is not diaphoretic. There is erythema. Psychiatric: His behavior is normal.   Nursing note and vitals reviewed.        [facility]  ED Course       Procedures    Vitals:  Patient Vitals for the past 12 hrs:   Temp Pulse Resp BP SpO2   05/18/17 0201 97.8 °F (36.6 °C) - - - -   05/18/17 0130 - 74 26 104/60 98 %   05/18/17 0030 - 74 17 (!) 111/98 98 %   05/18/17 0015 - 74 22 90/48 91 %   05/17/17 2349 - 77 30 - 99 %         Medications ordered:   Medications   cefTRIAXone (ROCEPHIN) 1 g in 0.9% sodium chloride (MBP/ADV) 50 mL MBP (0 g IntraVENous IV Completed 5/18/17 0143)   sodium chloride 0.9 % bolus infusion 500 mL (0 mL IntraVENous IV Completed 5/18/17 0143)   vancomycin (VANCOCIN) 1,000 mg in 0.9% sodium chloride (MBP/ADV) 250 mL adv (1,000 mg IntraVENous New Bag 5/18/17 0048)         Lab findings:  Recent Results (from the past 12 hour(s))   CBC WITH AUTOMATED DIFF    Collection Time: 05/17/17 11:48 PM   Result Value Ref Range    WBC 11.6 4.6 - 13.2 K/uL    RBC 4.05 (L) 4.70 - 5.50 M/uL    HGB 12.7 (L) 13.0 - 16.0 g/dL    HCT 36.1 36.0 - 48.0 %    MCV 89.1 74.0 - 97.0 FL    MCH 31.4 24.0 - 34.0 PG    MCHC 35.2 31.0 - 37.0 g/dL    RDW 13.1 11.6 - 14.5 %    PLATELET 323 283 - 359 K/uL    MPV 9.1 (L) 9.2 - 11.8 FL    NEUTROPHILS 74 (H) 40 - 73 %    LYMPHOCYTES 17 (L) 21 - 52 %    MONOCYTES 5 3 - 10 %    EOSINOPHILS 4 0 - 5 %    BASOPHILS 0 0 - 2 %    ABS. NEUTROPHILS 8.6 (H) 1.8 - 8.0 K/UL    ABS. LYMPHOCYTES 2.0 0.9 - 3.6 K/UL    ABS. MONOCYTES 0.5 0.05 - 1.2 K/UL    ABS. EOSINOPHILS 0.5 (H) 0.0 - 0.4 K/UL    ABS. BASOPHILS 0.1 (H) 0.0 - 0.06 K/UL    DF AUTOMATED     PROTHROMBIN TIME + INR    Collection Time: 05/17/17 11:48 PM   Result Value Ref Range    Prothrombin time 35.9 (H) 11.5 - 15.2 sec    INR 3.9 (H) 0.8 - 1.2     PTT    Collection Time: 05/17/17 11:48 PM   Result Value Ref Range    aPTT 81.2 (H) 23.0 - 87.2 SEC   METABOLIC PANEL, COMPREHENSIVE    Collection Time: 05/17/17 11:48 PM   Result Value Ref Range    Sodium 124 (L) 136 - 145 mmol/L    Potassium 4.8 3.5 - 5.5 mmol/L    Chloride 91 (L) 100 - 108 mmol/L    CO2 21 21 - 32 mmol/L    Anion gap 12 3.0 - 18 mmol/L    Glucose 67 (L) 74 - 99 mg/dL    BUN 14 7.0 - 18 MG/DL    Creatinine 1.18 0.6 - 1.3 MG/DL    BUN/Creatinine ratio 12 12 - 20      GFR est AA >60 >60 ml/min/1.73m2    GFR est non-AA >60 >60 ml/min/1.73m2    Calcium 8.0 (L) 8.5 - 10.1 MG/DL    Bilirubin, total 1.1 (H) 0.2 - 1.0 MG/DL    ALT (SGPT) 18 16 - 61 U/L    AST (SGOT) 27 15 - 37 U/L    Alk.  phosphatase 76 45 - 117 U/L    Protein, total 6.1 (L) 6.4 - 8.2 g/dL    Albumin 2.4 (L) 3.4 - 5.0 g/dL    Globulin 3.7 2.0 - 4.0 g/dL    A-G Ratio 0.6 (L) 0.8 - 1.7     CARDIAC PANEL,(CK, CKMB & TROPONIN)    Collection Time: 05/17/17 11:48 PM   Result Value Ref Range     39 - 308 U/L    CK - MB 3.9 (H) <3.6 ng/ml    CK-MB Index 2.0 0.0 - 4.0 %    Troponin-I, Qt. <0.02 0.0 - 0.045 NG/ML   PRO-BNP    Collection Time: 05/17/17 11:48 PM   Result Value Ref Range    NT pro-BNP 2843 (H) 0 - 900 PG/ML   MAGNESIUM    Collection Time: 05/17/17 11:48 PM   Result Value Ref Range    Magnesium 1.9 1.6 - 2.6 mg/dL   POC LACTIC ACID    Collection Time: 05/17/17 11:59 PM   Result Value Ref Range    Lactic Acid (POC) 1.4 0.4 - 2.0 mmol/L   EKG, 12 LEAD, INITIAL    Collection Time: 05/18/17 12:25 AM   Result Value Ref Range    Ventricular Rate 81 BPM    Atrial Rate 82 BPM    QRS Duration 154 ms    Q-T Interval 454 ms    QTC Calculation (Bezet) 527 ms    Calculated R Axis -121 degrees    Calculated T Axis 63 degrees    Diagnosis       Atrial fibrillation with premature ventricular or aberrantly conducted   complexes  Nonspecific intraventricular block  Possible Lateral infarct , age undetermined  Abnormal ECG  No previous ECGs available     GLUCOSE, POC    Collection Time: 05/18/17  1:10 AM   Result Value Ref Range    Glucose (POC) 80 70 - 110 mg/dL   URINALYSIS W/ RFLX MICROSCOPIC    Collection Time: 05/18/17  1:40 AM   Result Value Ref Range    Color YELLOW      Appearance CLEAR      Specific gravity 1.013 1.005 - 1.030      pH (UA) 5.0 5.0 - 8.0      Protein NEGATIVE  NEG mg/dL    Glucose NEGATIVE  NEG mg/dL    Ketone NEGATIVE  NEG mg/dL    Bilirubin NEGATIVE  NEG      Blood NEGATIVE  NEG      Urobilinogen 1.0 0.2 - 1.0 EU/dL    Nitrites NEGATIVE  NEG      Leukocyte Esterase NEGATIVE  NEG         EKG interpretation by ED Physician:  afib with no acute st elevation, depression  Ns ivb  Rate 81, qtc 527    X-Ray, CT or other radiology findings or impressions:  XR CHEST PORT    (Results Pending)   CT HEAD WO CONT    (Results Pending)   cxr with cardiomegaly with pacemaker.  No acute process  Ct head with no acute process on prelim read    Progress notes, Consult notes or additional Procedure notes: Will need admission given extensive cellulitis, already failing outpt treatment  Doubt sepsis  D/wm pt need for admission  D/w Dr Isaac Sanchez on call for hospitalist who will admit    ED Critical Care Note    System at risk for life threatening failure: cardiac, renal, pulm  Associated problems: infection, afib, coagulopathy    Critical Care services provided: bedside management, consult, documentation  Excluded procedures (time not included in critical care): ecg interp    Total Critical Care Time (in minutes) 38          Reevaluation of patient:   stable    Disposition:  Diagnosis:   1. Cellulitis of left leg    2. Cellulitis of right leg    3. Cellulitis, abdominal wall    4. Chronic atrial fibrillation (HCC)    5. Anticoagulated on Coumadin    6.  Frequent falls        Disposition: admit      Follow-up Information     None            Patient's Medications    No medications on file

## 2017-05-18 NOTE — H&P
[address]    Name:  Sonam Resendiz  MR#:  [id number]  :  1955  Account #:  [de-identified]  Date of Adm:  2017      CHIEF COMPLAINT: Falls. HISTORY OF PRESENT ILLNESS: The patient is a 80-year-old male  with a history of coronary artery disease, CHF, hypertension, reports  that for the past day he has had 3 falls. He states that his falls are  followed by lightheadedness. Every time he falls he injures a different  part of his body, including his head, his back, etc. After the third fall  today he called 911 for help. He also reports that his blood pressure  checked at home has been consistently low. He does not recall the  number but says they are all under 831 systolic. Bilaterally he states  that he has tenderness in both his legs. He denies any headache or  blurry vision. He denies any chest pain, abdominal pain, nausea,  vomiting, diarrhea. While in the ED, the patient was found to have a  cellulitic appearance of both lower extremities, also in lower portion of  his abdomen beneath his pannus and some skin breakdown in the  buttocks. BNP was greater than 2800. Sodium was low and his INR is  supratherapeutic at 3.9. The patient is on Coumadin for chronic atrial  fibrillation. IV antibiotics were initiated. The patient will be admitted for  further management. Past Medical History:   Diagnosis Date    CAD (coronary artery disease)     Heart failure ([facility] [address]     Hypertension        Past Surgical History:   Procedure Laterality Date    HX PACEMAKER         Social History     Social History    Marital status: LEGALLY      Spouse name: N/A    Number of children: N/A    Years of education: N/A     Occupational History    Not on file.      Social History Main Topics    Smoking status: Never Smoker    Smokeless tobacco: Not on file    Alcohol use Yes    Drug use: No    Sexual activity: Not on file     Other Topics Concern    Not on file Social History Narrative    No narrative on file       History reviewed. No pertinent family history. No Known Allergies    Review of Systems:  Positive in bold. CONST: Fever, weight loss, fatigue or chills  GI: Nausea, vomiting, abdominal pain, change in bowel habits, hematochezia, melena, and GERD   INTEG: Dermatitis, abnormal moles  HEENT: Recent changes in vision, vertigo, epistaxis, dysphagia, hoarseness  CV: Chest pain, palpitations, HTN, edema and varicosities  RESP: Cough, shortness of breath, wheezing, hemoptysis, snoring. : Hematuria, dysuria, frequency, urgency, retention, incontinence   MS: Weakness,  Tenderness of both legs, joint pain and arthritis  ENDO: Diabetes, thyroid disease, polyuria, polydipsia, polyphagia, poor wound healing, heat intolerance, cold intolerance  LYMPH/HEME: Anemia, bruising and history of blood transfusions  NEURO: Dizziness, headache, fainting, seizures and stroke,  Lightheaded, Falls  PSYCH: Anxiety , depression    Physical Exam:      Visit Vitals    BP (!) 83/56    Pulse 77    Temp 97.8 °F (36.6 °C)    Resp 25    Ht 5' 11\" (1.803 m)    Wt 127 kg (280 lb)    SpO2 98%    BMI 39.05 kg/m2       Physical Exam:  Gen:  No distress, alert, awake and oriented x 4  HEENT:  Normal cephalic atraumatic, extra-occular movements are intact. Neck:  Supple, No JVD  Lungs:  Clear bilaterally, no wheeze, no rales, normal effort  Cardiovascular:  Regular Rate, irregularly irregular rhythm, normal S1 and S2, no audible murmur. Capillary refill: < 3 seconds. Abdomen:  Soft, + tender, non distended, normal bowel sounds, no guarding. Extremities:  Well perfused, no cyanosis, bilateral erythema, redness and warmth below the knees. tender   Peripheral pulse: 2+  Neurological:  Awake and alert, CN's are intact, normal strength throughout extremities  Skin:  No rashes or moles.   Turgor and color normal  Mental Status:  Normal thought process, does not appear anxious      Laboratory Studies: All lab results for the last 24 hours reviewed. Assessment/Plan     Active Problems:    Cellulitis of abdominal wall (5/18/2017)      Cellulitis of both lower extremities (5/18/2017)        PLAN:    Respiratory: shortness of breath. Oxygen via nasal canula as needed    Infectious: cellulitis   Continue IV antibiotics-  Rocephin, vancomycin   Continuous hydration    CV: HTN, AFib, CHF, CAD   Admit to tele   Continue antihypertensive mediation as per home   Hold coumadin today for supra therapeutic INR   Daily INR   Monitor vitals as per unit    Metabolic/Endo: hyponatremia   Repeat chemistry    Misc:  FULL CODE   Physical therapy to evaluate and treat   Fall precautions   Ambulate with assistance. Monitor intake and output   Monitor vitals as per unit   Replace electrolytes as needed. Follow up am labs.       MD MARIBEL Sánchez / ASHELY  D:  05/18/2017   05:52  T:  05/18/2017   06:33  Job #:  [id number]

## 2017-05-18 NOTE — ROUTINE PROCESS
0720  Bedside and Verbal shift change report given to Orlando Mo (oncoming nurse) by Eve Neri (offgoing nurse). Report included the following information SBAR, Kardex, Intake/Output and MAR.     0930  Shift assessment complete and due meds given. No complaints at this time. Pt very lethargic    1230  Pt requesting to rest after long night in ED. Will continue to monitor. 1600  Reassessment complete. No change in pt condition. Call bell within reach    [phone]  Pt belongings collected: 2 prescriptions sent to pharmacy; 2 wallets (one including ID, cards, and $58)  and 2 sets of keys accounted for and given to  Brittnee Harris to call security    1900  Bedside and Verbal shift change report given to Eve Neri (oncoming nurse) by Orlando Mo (offgoing nurse). Report included the following information SBAR, Kardex, Intake/Output and MAR.

## 2017-05-18 NOTE — PROGRESS NOTES
Patient admitted for cellulitis of abdominal wall,cellulitis of both legs. Please see H/P written today by . Patient is on kamran spectrum atbx. I evaluated the patient and agree with plan of care. Blood cx negative so far. Will follow-up other results.

## 2017-05-18 NOTE — PROGRESS NOTES
[facility]/[address]   Discharge Planning/ Assessment    Reasons for Intervention: Chart reviewed. Met with pt., verified all demographics. [facility] Dr. Cecile Cano is his PCP. NOK: Aarti Small dtr, whom he designates can participate in his discharge process. Has walker.  is currently active with [facility]. Lives alone, however states he will be moving in with his daughter. Asked him about SNF,  will go home with his daughter. PLAN: home with home health when medically stable. Will need orders to resume home care with specific wound care orders, thanks. Will cont to follow for further needs. Analisa KnutsonRN,ext. [phone].       High Risk Criteria  [x] Yes  []No   Physician Referral  [] Yes  [x]No        Date    Nursing Referral  [] Yes  [x]No        Date    Patient/Family Request  [] Yes  [x]No        Date       Resources:    Medicare  [x] Yes  []No   Medicaid  [] Yes  [x]No   No Resources  [] Yes  [x]No   Private Insurance  [] Yes  [x]No    Name/Phone Number    Other  [] Yes  [x]No        (i.e. Workman's Comp)         Prior Services:    Prior Services  [x] Yes  []No   Home Health  [x] Yes  []No   [address]  [] Yes  [x]No        Number of [address] Program  [] Yes  [x]No       Meals on Wheels  [] Yes  [x]No   Office on Aging  [] Yes  [x]No   Transportation Services  [] Yes  []No   [address]  [] Yes  [x]No        Nursing Home Name    [facility]  [] Yes  [x]No        [address] Name    Other       Information Source:      Information obtained from  [x] Patient  [] Parent   [] [address]  [] Child  [] Spouse   [] Significant Other/Partner   [] Friend      [] EMS    [] Nursing Home Chart          [] Other:   Chart Review  [x] Yes  []No     Family/Support System:    Patient lives with  [x] Alone    [] Spouse   [] Significant Other  [] Children  [] Caretaker   [] Parent  [] Sibling     [] Other       Other Support System:    Is the patient responsible for care of others  [] Yes  [x]No   Information of person caring for patient on  discharge    Managers financial affairs independently  [x] Yes  []No   If no, explain:      Status Prior to Admission:    Mental Status  [x] Awake  [x] Alert  [x] Oriented  [x] Quiet/Calm [] Lethargic/Sedated   [] Disoriented  [] Restless/Anxious  [] Combative   Personal Care  [] Dependent  [x] [address]  [] Requires Assistance   Meal Preparation Ability  [x] Independent   [] Standby Assistance   [] Minimal Assistance   [] Moderate Assistance  [] Maximum Assistance     [] Total Assistance   Chores  [x] Independent with Chores   [] [facility] Resident   [] Requires Assistance   Bowel/Bladder  [x] Continent  [] Catheter  [] Incontinent  [] Ostomy Self-Care    [] Urine Diversion Self-Care  [] Maximum Assistance     [] Total Assistance   Number of Persons needed for assistance    DME at home  [] Victoriano Heath  [] Sonya Heath   [] Commode    [] Bathroom/Grab Bars  [] Hospital Bed  [] Nebulizer  [] Oxygen           [] Raised Toilet Seat  [] Shower Chair  [] Side Rails for Bed   [] Tub Transfer Bench   [x] Evone Leaver  [] [address]      [] Other:   Vendor      Treatment Presently Receiving:    Current Treatments  [] Chemotherapy  [] Dialysis  [] Insulin  [x] IVAB [x] IVF   [] O2  [] PCA   [x] PT   [] RT   [] Tube Feedings   [x] Wound Care     Psychosocial Evaluation:    Verbalized Knowledge of Disease Process  [] Patient  []Family   Coping with Disease Process  [] Patient  []Family   Requires Further Counseling Coping with Disease Process  [] Patient  []Family     Identified Projected Needs:    Home Health Aid  [] Yes  [x]No   Transportation  [] Yes  [x]No   Education  [] Yes  [x]No        Specific Education     Financial Counseling  [] Yes  [x]No   Inability to Care for Self/Will Require 24 hour care  [] Yes  [x]No   Pain Management [] Yes  [x]No   Home Infusion Therapy  [] Yes  [x]No   Oxygen Therapy  [] Yes  [x]No   DME  [] Yes  [x]No   Long Term Care Placement  [] Yes  [x]No   Rehab  [] Yes  [x]No   Physical Therapy  [] Yes  [x]No   Needs Anticipated At This Time  [x] Yes  []No     Intra-Hospital Referral:    [facility]  [] Yes  [x]No     [] Yes  [x]No   Patient Representative  [] Yes  [x]No   Staff for Teaching Needs  [] Yes  [x]No   Specialty Teaching Needs     Diabetic Educator  [] Yes  [x]No   Referral for Diabetic Educator Needed  [] Yes  [x]No  If Yes, place order for Nutritionist or Diabetic Consult     Tentative Discharge Plan:    Home with No Services  [] Yes  [x]No   Home with Home Health Follow-up  [x] Yes  []No        If Yes, specify type    Home Care Program  [] Yes  [x]No        If Yes, specify type    Meals on Wheels  [] Yes  [x]No   Office of Aging  [] Yes  [x]No   NHP  [] Yes  [x]No   Return to the Nursing Home  [] Yes  [x]No   Rehab Therapy  [] Yes  [x]No   Acute Rehab  [] Yes  [x]No   Subacute Rehab  [] Yes  [x]No   Private Care  [] Yes  [x]No   Substance Abuse Referral  [] Yes  [x]No   Transportation  [] Yes  [x]No   Chore Service  [] Yes  [x]No   Inpatient Hospice  [] Yes  [x]No   OP RT  [] Yes  [x] No   OP Hemo  [] Yes  [x] No   OP PT  [] Yes  [x]No   Support Group  [] Yes  [x]No   Reach to Recovery  [] Yes  [x]No   OP Oncology Clinic  [] Yes  [x]No   Clinic Appointment  [] Yes  [x]No   DME  [] Yes  [x]No   Comments    Name of D/C Planner or  Given to Patient or Family Melody El   Phone Number Pager: [phone]        Extension Ext. [phone]   Date 05-   Time    If you are discharged home, whom do you designate to participate in your discharge plan and receive any information needed?      Enter name of [address] [place] [address]        Phone # of designee [phone]        Address of designee         Updated         Patient refused to designate any           individual

## 2017-05-18 NOTE — PROGRESS NOTES
Bedside and Verbal shift change report received from [address] [place] [address],B-1 (offgoing nurse). Report included the following information SBAR, Kardex, Intake/Output, MAR and Recent Results. 1940-Shift assessment completed, pt resting in bed, call bell within reach. 2100-Patient requested wound dressing to be changed to BLE, cleaned with Normal Saline, wrapped with gauze, wound care to see patient yet. 2244-PRN tylenol administered for pain. 2340-Reassessment completed. 0021-Scheduled medications administered, pt tolerated well. 0340-Reassessment completed. Bedside and Verbal shift change report given to [address] [place] [address] Edy (oncoming nurse) by Alexia Westbrook RN (offgoing nurse). Report included the following information SBAR, Kardex, Intake/Output, MAR and Recent Results.

## 2017-05-18 NOTE — ROUTINE PROCESS
Plan:  To provide an enjoyable diversion. Implementation:  Provided live bedside harp music, classic rock selections per patient request.  Evaluation: Patient appeared drowsy during most of music time, states \"that definitely relaxed me\".

## 2017-05-19 LAB
ANION GAP BLD CALC-SCNC: 8 MMOL/L (ref 3–18)
BACTERIA SPEC CULT: NORMAL
BASOPHILS # BLD AUTO: 0.1 K/UL (ref 0–0.06)
BASOPHILS # BLD: 1 % (ref 0–2)
BUN SERPL-MCNC: 11 MG/DL (ref 7–18)
BUN/CREAT SERPL: 12 (ref 12–20)
CALCIUM SERPL-MCNC: 8.3 MG/DL (ref 8.5–10.1)
CHLORIDE SERPL-SCNC: 102 MMOL/L (ref 100–108)
CO2 SERPL-SCNC: 21 MMOL/L (ref 21–32)
CREAT SERPL-MCNC: 0.89 MG/DL (ref 0.6–1.3)
DATE LAST DOSE: 0
DIFFERENTIAL METHOD BLD: ABNORMAL
EOSINOPHIL # BLD: 0.7 K/UL (ref 0–0.4)
EOSINOPHIL NFR BLD: 7 % (ref 0–5)
ERYTHROCYTE [DISTWIDTH] IN BLOOD BY AUTOMATED COUNT: 13.5 % (ref 11.6–14.5)
GLUCOSE SERPL-MCNC: 90 MG/DL (ref 74–99)
HCT VFR BLD AUTO: 32 % (ref 36–48)
HGB BLD-MCNC: 11 G/DL (ref 13–16)
INR PPP: 2.3 (ref 0.8–1.2)
LYMPHOCYTES # BLD AUTO: 19 % (ref 21–52)
LYMPHOCYTES # BLD: 1.8 K/UL (ref 0.9–3.6)
MCH RBC QN AUTO: 31.3 PG (ref 24–34)
MCHC RBC AUTO-ENTMCNC: 34.4 G/DL (ref 31–37)
MCV RBC AUTO: 90.9 FL (ref 74–97)
MONOCYTES # BLD: 0.8 K/UL (ref 0.05–1.2)
MONOCYTES NFR BLD AUTO: 8 % (ref 3–10)
NEUTS SEG # BLD: 6.3 K/UL (ref 1.8–8)
NEUTS SEG NFR BLD AUTO: 65 % (ref 40–73)
PLATELET # BLD AUTO: 433 K/UL (ref 135–420)
PMV BLD AUTO: 9.1 FL (ref 9.2–11.8)
POTASSIUM SERPL-SCNC: 4.7 MMOL/L (ref 3.5–5.5)
PROTHROMBIN TIME: 24.5 SEC (ref 11.5–15.2)
RBC # BLD AUTO: 3.52 M/UL (ref 4.7–5.5)
REPORTED DOSE,DOSE: ABNORMAL UNITS
REPORTED DOSE/TIME,TMG: 0
SERVICE CMNT-IMP: NORMAL
SODIUM SERPL-SCNC: 131 MMOL/L (ref 136–145)
VANCOMYCIN TROUGH SERPL-MCNC: 22.6 UG/ML (ref 10–20)
WBC # BLD AUTO: 9.6 K/UL (ref 4.6–13.2)

## 2017-05-19 PROCEDURE — 65660000000 HC RM CCU STEPDOWN

## 2017-05-19 PROCEDURE — 74011250637 HC RX REV CODE- 250/637: Performed by: HOSPITALIST

## 2017-05-19 PROCEDURE — 74011250636 HC RX REV CODE- 250/636: Performed by: INTERNAL MEDICINE

## 2017-05-19 PROCEDURE — 74011250636 HC RX REV CODE- 250/636: Performed by: HOSPITALIST

## 2017-05-19 PROCEDURE — 77030019604 HC DRSG WND CA ALG S&N -A

## 2017-05-19 PROCEDURE — 85025 COMPLETE CBC W/AUTO DIFF WBC: CPT | Performed by: HOSPITALIST

## 2017-05-19 PROCEDURE — 97530 THERAPEUTIC ACTIVITIES: CPT

## 2017-05-19 PROCEDURE — 80202 ASSAY OF VANCOMYCIN: CPT | Performed by: HOSPITALIST

## 2017-05-19 PROCEDURE — 36415 COLL VENOUS BLD VENIPUNCTURE: CPT | Performed by: HOSPITALIST

## 2017-05-19 PROCEDURE — 74011000258 HC RX REV CODE- 258: Performed by: INTERNAL MEDICINE

## 2017-05-19 PROCEDURE — 97162 PT EVAL MOD COMPLEX 30 MIN: CPT

## 2017-05-19 PROCEDURE — 74011000258 HC RX REV CODE- 258: Performed by: EMERGENCY MEDICINE

## 2017-05-19 PROCEDURE — 77030020263 HC SOL INJ SOD CL0.9% LFCR 1000ML

## 2017-05-19 PROCEDURE — 74011250636 HC RX REV CODE- 250/636: Performed by: EMERGENCY MEDICINE

## 2017-05-19 PROCEDURE — 77030027688 HC DRSG MEPILEX 16-48IN NO BORD MOLN -A

## 2017-05-19 PROCEDURE — 85610 PROTHROMBIN TIME: CPT | Performed by: HOSPITALIST

## 2017-05-19 PROCEDURE — 74011250637 HC RX REV CODE- 250/637: Performed by: INTERNAL MEDICINE

## 2017-05-19 PROCEDURE — 80048 BASIC METABOLIC PNL TOTAL CA: CPT | Performed by: HOSPITALIST

## 2017-05-19 RX ORDER — CIPROFLOXACIN 500 MG/1
500 TABLET ORAL 2 TIMES DAILY
Status: ON HOLD | COMMUNITY
End: 2017-05-19

## 2017-05-19 RX ORDER — NITROGLYCERIN 0.4 MG/1
0.4 TABLET SUBLINGUAL
COMMUNITY
End: 2017-06-09

## 2017-05-19 RX ORDER — CARVEDILOL 12.5 MG/1
12.5 TABLET ORAL 2 TIMES DAILY WITH MEALS
Status: DISCONTINUED | OUTPATIENT
Start: 2017-05-19 | End: 2017-05-24 | Stop reason: HOSPADM

## 2017-05-19 RX ORDER — LISINOPRIL 5 MG/1
5 TABLET ORAL DAILY
COMMUNITY
End: 2017-05-24

## 2017-05-19 RX ORDER — LISINOPRIL 5 MG/1
5 TABLET ORAL DAILY
Status: DISCONTINUED | OUTPATIENT
Start: 2017-05-20 | End: 2017-05-22

## 2017-05-19 RX ORDER — FUROSEMIDE 20 MG/1
20 TABLET ORAL 2 TIMES DAILY
COMMUNITY
End: 2017-06-09

## 2017-05-19 RX ORDER — OXYCODONE AND ACETAMINOPHEN 5; 325 MG/1; MG/1
1-2 TABLET ORAL
Status: ON HOLD | COMMUNITY
End: 2017-05-24

## 2017-05-19 RX ORDER — ZINC OXIDE 20 G/100G
OINTMENT TOPICAL DAILY
Status: DISCONTINUED | OUTPATIENT
Start: 2017-05-20 | End: 2017-05-24 | Stop reason: HOSPADM

## 2017-05-19 RX ORDER — NITROGLYCERIN 0.4 MG/1
0.4 TABLET SUBLINGUAL
Status: DISCONTINUED | OUTPATIENT
Start: 2017-05-19 | End: 2017-05-24 | Stop reason: HOSPADM

## 2017-05-19 RX ORDER — MENTHOL AND ZINC OXIDE .44; 20.625 G/100G; G/100G
1 OINTMENT TOPICAL DAILY
COMMUNITY

## 2017-05-19 RX ORDER — OXYCODONE AND ACETAMINOPHEN 5; 325 MG/1; MG/1
1-2 TABLET ORAL
Status: DISCONTINUED | OUTPATIENT
Start: 2017-05-19 | End: 2017-05-24 | Stop reason: HOSPADM

## 2017-05-19 RX ORDER — FUROSEMIDE 20 MG/1
20 TABLET ORAL 2 TIMES DAILY
Status: DISCONTINUED | OUTPATIENT
Start: 2017-05-19 | End: 2017-05-22

## 2017-05-19 RX ORDER — CARVEDILOL 12.5 MG/1
12.5 TABLET ORAL 2 TIMES DAILY WITH MEALS
COMMUNITY
End: 2017-06-09

## 2017-05-19 RX ORDER — FUROSEMIDE 20 MG/1
20 TABLET ORAL DAILY
Status: DISCONTINUED | OUTPATIENT
Start: 2017-05-19 | End: 2017-05-19 | Stop reason: SDUPTHER

## 2017-05-19 RX ADMIN — CEFTRIAXONE 1 G: 1 INJECTION, POWDER, FOR SOLUTION INTRAMUSCULAR; INTRAVENOUS at 00:21

## 2017-05-19 RX ADMIN — Medication 10 ML: at 22:20

## 2017-05-19 RX ADMIN — ACETAMINOPHEN 650 MG: 325 TABLET, FILM COATED ORAL at 12:06

## 2017-05-19 RX ADMIN — WARFARIN SODIUM 7.5 MG: 2.5 TABLET ORAL at 15:21

## 2017-05-19 RX ADMIN — FUROSEMIDE 20 MG: 20 TABLET ORAL at 18:44

## 2017-05-19 RX ADMIN — SODIUM CHLORIDE 75 ML/HR: 900 INJECTION, SOLUTION INTRAVENOUS at 04:56

## 2017-05-19 RX ADMIN — PIPERACILLIN SODIUM,TAZOBACTAM SODIUM 3.38 G: 3; .375 INJECTION, POWDER, FOR SOLUTION INTRAVENOUS at 22:16

## 2017-05-19 RX ADMIN — Medication 10 ML: at 15:22

## 2017-05-19 RX ADMIN — SODIUM CHLORIDE 1500 MG: 900 INJECTION, SOLUTION INTRAVENOUS at 04:56

## 2017-05-19 RX ADMIN — Medication 10 ML: at 05:00

## 2017-05-19 RX ADMIN — PIPERACILLIN SODIUM,TAZOBACTAM SODIUM 3.38 G: 3; .375 INJECTION, POWDER, FOR SOLUTION INTRAVENOUS at 15:23

## 2017-05-19 RX ADMIN — FUROSEMIDE 20 MG: 20 TABLET ORAL at 12:06

## 2017-05-19 RX ADMIN — Medication 10 ML: at 00:21

## 2017-05-19 RX ADMIN — CARVEDILOL 12.5 MG: 12.5 TABLET, FILM COATED ORAL at 16:32

## 2017-05-19 NOTE — PROGRESS NOTES
[facility] Dosing Services     Pharmacy dosing ABX empirically for treatment cellulitis of legs     Day of Therapy: 1    Labs:   Level: 22.6 mg/L  (~10 hrs post dose)   Extrapolated trough : 17.7 mg/L  Bun/Serum Creatinine - 11 mg/dl / 0.89 mg/dl  CrCl - 118.4 ml/min  WBC - 9.6  Max temp: - 98.8    Cultures:   NGTD    Plan:   Vancomycin:   Goal trough 15-20. Continue with 1.5 gm iv q12h. Pharmacy to follow and will make changes to dose and/or frequency based on clinical status. Thanks for the consult.

## 2017-05-19 NOTE — ACP (ADVANCE CARE PLANNING)
Attempted to visit patient after I was told that he was interested in information about Advance Medical Directives. Finding him asleep, I left him a blank form, along with our Advance Care Planning booklet. In a note to patient, I suggested that he read over the information and then ask his nurse or the hospital  to page a  if he wants further assistance.     [facility]  Board Certified [address] [place] [address]    [phone]

## 2017-05-19 NOTE — WOUND CARE
General Progress Note    Patient: Dayna Hearn MRN: [id number] LOS: 1     YOB: 1955  Age: 64 y.o. Sex: male        Admit Date: 5/17/2017      Subjective:   Patient has no complaint of pain with wound care assessment/treatment however, did verbalize mild intermittent discomfort. Jaime Tapia bedside to assist.  Patient being followed by wound care at [facility] and per patient Calmoseptine ointment was being used on BLE. Please see wound assessment below. Report to Jamie Tapia; orders received, Dr. Helen Lopez    Objective:     Vitals  Patient Vitals for the past 8 hrs:   BP Temp Pulse Resp SpO2   05/19/17 1300 107/71 - 75 - -   05/19/17 1039 94/58 97.6 °F (36.4 °C) 61 19 94 %   05/19/17 0713 95/60 97.9 °F (36.6 °C) 68 18 91 %       I/O Current Shift  05/19 0701 - 05/19 1900  In: 516.3 [P.O.:440; I.V.:76.3]  Out: 1000 [Urine:1000]    I/O Last Three Shifts  05/17 1901 - 05/19 0700  In: 3623.8 [P.O.:840; I.V.:2783.8]  Out: 3225 [Urine:3225]                Assessment:     Wound Presentation:       05/19/17 1357   Wound Leg Left; Lower   Date First Assessed/Time First Assessed: 05/18/17 0000   POA: Yes  Wound Type: Vascular  Location: Leg  Orientation: Left; Lower   DRESSING STATUS Removed   DRESSING TYPE Gauze wrap (yaritza);ABD pad   Non-Pressure Injury Partial thickness (epider/derm)   Wound Length (cm) 2.2 cm  (2.2x4.9x0.2 area with 4 small open lesions)   Wound Width (cm) 4.9 cm   Wound Depth (cm) 0.2   Wound Surface area (cm^3) 2.16 cm^2   Condition of Base Other (comment)  (Yellow)   Condition of Edges Open   Tissue Type Yellow   Tissue Type Percent Yellow 100   Drainage Amount  Small    Drainage Color Serous   Wound Odor None   Periwound Skin Condition Edematous; Erythema, blanchable   Cleansing and Cleansing Agents  Soap and water   Dressing Type Applied Silver products;Silicone  (Mep[employer]x [employer] Border)   Procedure Tolerated Well                         Active Problems:    Cellulitis of abdominal wall (5/18/2017)      Cellulitis of both lower extremities (5/18/2017)          Patient sitting up in bed with meal tray; bed rails up x3, bed low and locked, lights dimmed for comfort, and call bell within patient's reach. Patient verbalized understanding to all instructions provided but needs additional instruction. Plan:     Nursing/[place]:  Please order Total Care Bariatric Plus with Air rental bed. Call [facility] at [phone] to obtain PO #, provide [facility] Rental # [id number]. Then Call [facility] at [phone] to order rental bed (notify [facility] of PO # obtained). Linen instructions: One flat sheet and one TAP System. Continue to turn patient every 2 hours and float heels at all times with Prevalon boots. When therapy no longer required, please call [phone] to return rental bed to [facility]. Nursing to Perform Every 12 hours or Each Shift  Cleanse patients Bilateral Lower Legs thoroughly with soap and water. Apply Aquaphor ointment to BLE none between toes please.       Russ Nguyen RN, [facility]

## 2017-05-19 NOTE — PROGRESS NOTES
attempted to visit with Ciara Harrison, who is a 64 y.o.,male to assist in the completion of a [address], per his request.  However, patient was asleep, with no family at bedside. Patients Primary Language is: [place]. Patient was admitted for:  Patient Active Problem List    Diagnosis Date Noted    Cellulitis of abdominal wall 05/18/2017    Cellulitis of both lower extremities 05/18/2017        The  provided the following interventions:    Reviewed chart. Initiated a relationship of care and support, leaving patient a departmental brochure with a note. Also provided patient with our Advance Medical Directive (AMD) information with a blank form, asking in my note that he read over it and then have his nurse or the  page us if he would like further assistance. Plan:  Chaplains will continue to follow and will provide pastoral care and AMD assistance on an as-needed/requested basis.     [facility]  Board Certified [address] [place] [address],[address] [place] [address]    [phone]

## 2017-05-19 NOTE — PROGRESS NOTES
1123: PT eval orders received and chart reviewed. Patient on phone in room. Dr. Isma Mckeon in room with patient discussing care. Will re-attempt PT eval as schedule permits.       Thank you,     Kika Moreno, PT, DPT

## 2017-05-19 NOTE — PROGRESS NOTES
Hospitalist Progress Note  Rai Hedrick MD  Internal medicine/ Hospitalist    Daily Progress Note: 2017 12:31 PM      Interval history / Subjective:   64 y.old male with h/o CAD,CHF,HTN,varicose veins of lower extremities with ulcer and inflammation,stasis dermatitis,PAD,Biventricular implantable cardioverter-defibrillator in situ,peritoneal metastasis. Came to ED because of lightheadedness and falls. Pt claims that he has h/o low blood pressure. In ED SBP<100. He also reported legs pain and was noted to have cellulitis of legs with ulcers. Patient says that he has been treated several time with legs infections which come and go. He has had multiple procedures with vascular surgeon in the past.He was also noted to have cellulitis of abdominal wall. He was started on rocephin and vancomycin. His routine meds were resumed. Current Facility-Administered Medications   Medication Dose Route Frequency    furosemide (LASIX) tablet 20 mg  20 mg Oral DAILY    cefTRIAXone (ROCEPHIN) 1 g in 0.9% sodium chloride (MBP/ADV) 50 mL MBP  1 g IntraVENous Q24H    sodium chloride (NS) flush 5-10 mL  5-10 mL IntraVENous Q8H    sodium chloride (NS) flush 5-10 mL  5-10 mL IntraVENous PRN    0.9% sodium chloride infusion  75 mL/hr IntraVENous CONTINUOUS    vancomycin (VANCOCIN) 1,500 mg in 0.9% sodium chloride 500 mL IVPB  1,500 mg IntraVENous Q12H    VANCOMYCIN INFORMATION NOTE   Other ONCE    acetaminophen (TYLENOL) tablet 650 mg  650 mg Oral Q6H PRN        Review of Systems  Feeling better today despite legs pain.     Objective:     Visit Vitals    BP 94/58    Pulse 61    Temp 97.6 °F (36.4 °C)    Resp 19    Ht 5' 11\" (1.803 m)    Wt 127 kg (280 lb)    SpO2 94%    BMI 39.05 kg/m2           Temp (24hrs), Av.1 °F (36.7 °C), Min:97.6 °F (36.4 °C), Max:98.8 °F (37.1 °C)      701 - 1900  In: 516.3 [P.O.:440; I.V.:76.3]  Out: 1000 [Urine:1000]  1901 -  0700  In: 3623.8 [P.O.:840; I.V.:2783.8]  Out: 3225 [Urine:3225]      Data Review  Gen: No distress, alert, awake and oriented x 4  HEENT: At/nc,mouth moist  Neck: Supple, No JVD  Lungs: Clear bilaterally, no wheeze, no rales, normal effort  Cardiovascular:S1S2,irreg,irreg  Abdomen:redness lower portion of abdomen. Extremities: Bilateral erythema, redness and warmth below the knees. There are ulcers medial aspect of both legs. Neurological: Awake and alert, CN's are intact, normal strength throughout extremities  Skin: No rashes or moles. Turgor and color normal  Mental Status: Normal thought process, does not appear anxious  Recent Results (from the past 12 hour(s))   PROTHROMBIN TIME + INR    Collection Time: 05/19/17  4:35 AM   Result Value Ref Range    Prothrombin time 24.5 (H) 11.5 - 15.2 sec    INR 2.3 (H) 0.8 - 1.2     CBC WITH AUTOMATED DIFF    Collection Time: 05/19/17  4:35 AM   Result Value Ref Range    WBC 9.6 4.6 - 13.2 K/uL    RBC 3.52 (L) 4.70 - 5.50 M/uL    HGB 11.0 (L) 13.0 - 16.0 g/dL    HCT 32.0 (L) 36.0 - 48.0 %    MCV 90.9 74.0 - 97.0 FL    MCH 31.3 24.0 - 34.0 PG    MCHC 34.4 31.0 - 37.0 g/dL    RDW 13.5 11.6 - 14.5 %    PLATELET 230 (H) 919 - 420 K/uL    MPV 9.1 (L) 9.2 - 11.8 FL    NEUTROPHILS 65 40 - 73 %    LYMPHOCYTES 19 (L) 21 - 52 %    MONOCYTES 8 3 - 10 %    EOSINOPHILS 7 (H) 0 - 5 %    BASOPHILS 1 0 - 2 %    ABS. NEUTROPHILS 6.3 1.8 - 8.0 K/UL    ABS. LYMPHOCYTES 1.8 0.9 - 3.6 K/UL    ABS. MONOCYTES 0.8 0.05 - 1.2 K/UL    ABS. EOSINOPHILS 0.7 (H) 0.0 - 0.4 K/UL    ABS.  BASOPHILS 0.1 (H) 0.0 - 0.06 K/UL    DF AUTOMATED     METABOLIC PANEL, BASIC    Collection Time: 05/19/17  4:35 AM   Result Value Ref Range    Sodium 131 (L) 136 - 145 mmol/L    Potassium 4.7 3.5 - 5.5 mmol/L    Chloride 102 100 - 108 mmol/L    CO2 21 21 - 32 mmol/L    Anion gap 8 3.0 - 18 mmol/L    Glucose 90 74 - 99 mg/dL    BUN 11 7.0 - 18 MG/DL    Creatinine 0.89 0.6 - 1.3 MG/DL    BUN/Creatinine ratio 12 12 - 20      GFR est AA >60 >60 ml/min/1.73m2    GFR est non-AA >60 >60 ml/min/1.73m2    Calcium 8.3 (L) 8.5 - 10.1 MG/DL         Assessment/Plan:     Active Problems:    Cellulitis of abdominal wall (5/18/2017)      Cellulitis of both lower extremities (5/18/2017)      PAD    HTN    AFib    CAD    CHF    Biventricular implantable cardioverter-defibrillator in situ. Care Plan  1-Cellulitis legs with ulcers and cellulitis abdominal wall    -Will order vanc and zosyn    -Blcx negative    -Wound care team  2-Afib    -On coumadin and coreg    -Monitor INR  3-CAD/CHF/HTN    -Resume routine meds  DVT prophylaxis:on coumadin.

## 2017-05-19 NOTE — PROGRESS NOTES
Order total care bariatric plus with air from [facility]. Confirmation [id number].

## 2017-05-19 NOTE — MANAGEMENT PLAN
Discharge Plan    Discussed discharge plan with patient. States he is moving in with his daughter post hospital and would like [address]. He was using [facility] and wishes to use another company. Will need detailed orders for Home Health wound care.  Also need skilled nurse eval, home pt/ot eval and treat    Aurora Devries RN BSN  Outcomes Manager  Pager # [phone]

## 2017-05-19 NOTE — PROGRESS NOTES
Problem: Mobility Impaired (Adult and Pediatric)  Goal: *Acute Goals and Plan of Care (Insert Text)  Physical Therapy Goals  Initiated 5/19/2017 and to be accomplished within 7 day(s)  1. Patient will move from supine to sit and sit to supine in bed with supervision/set-up. 2. Patient will transfer from bed to chair and chair to bed with supervision/set-up using the least restrictive device. 3. Patient will perform sit to stand with supervision/set-up. 4. Patient will ambulate with supervision/set-up for 25 feet with the least restrictive device. 5. Patient will ascend/descend 3 stairs with handrail(s) with minimal assistance/contact guard assist.  Outcome: Progressing Towards Goal  PHYSICAL THERAPY EVALUATION     Patient: Deidre Grimm (64 y.o. male)  Date: 5/19/2017  Primary Diagnosis: Cellulitis of both lower extremities  Cellulitis of abdominal wall        Precautions: Fall      PROBLEM LIST:  Patient presents with the following problems:   Bed Mobility, Transfers, Gait and Stairs  ASSESSMENT :   Patient requires between maximal assistance and moderate assistance  for bed mobility, transfers and ambulation. Max encouragement for activity. Denies pain at start of session. /71; HR 75 in bed. Attempted supine to sit transfer to edge of bed. BLE off bed; refusing to sit up trunk to seat position secondary to pain. Requesting pain meds; RN Jeison/Cheryl notified. Patient requesting to lay back down as he \"does not feel good\". Educated on role of PT and mobility. Patient verbalized understanding. Assisted patient with lower extremities to return to supine positioning. Completed rolling in bed to reposition pad/gown underneath him. Total assist x2 to scoot up in bed; assist by RN. Patient's discharge plan to move in with adult daughter in one story home; 2 steps to enter. Uses a walker prior to admission.        Patient will benefit from skilled intervention to address the above impairments. Patients rehabilitation potential is considered to be Fair  Factors which may influence rehabilitation potential include:   [ ]         None noted  [ ]         Mental ability/status  [X]         Medical condition  [ ]         Home/family situation and support systems  [ ]         Safety awareness  [X]         Pain tolerance/management  [ ]         Other:        PLAN :  Recommendations and Planned Interventions:  [X]           Bed Mobility Training             [X]    Neuromuscular Re-Education  [X]           Transfer Training                   [ ]    Orthotic/Prosthetic Training  [X]           Gait Training                          [ ]    Modalities  [X]           Therapeutic Exercises          [ ]    Edema Management/Control  [X]           Therapeutic Activities            [X]    Patient and Family Training/Education  [ ]           Other (comment):     Frequency/Duration: Patient will be followed by physical therapy 3 times a week to address goals. Discharge Recommendations: [facility] pending functional mobility eval  Further Equipment Recommendations for Discharge: rolling walker; patient has       SUBJECTIVE:   Patient stated Dene Furlough to keep you waiting.       OBJECTIVE DATA SUMMARY:       Past Medical History:   Diagnosis Date    CAD (coronary artery disease)      Heart failure ([address]      Hypertension       Past Surgical History:   Procedure Laterality Date    HX PACEMAKER         Barriers to Learning/Limitations: None  Compensate with: visual, verbal, tactile, kinesthetic cues/model     G CODES:Mobility  Current  CN= 100%   Goal  CL= 60-79%. The severity rating is based on the Other [facility] Sitting Balance Scale 0/5     [facility] Sitting Balance Scale 0/5  0: Pt performs 25% or less of sitting activity (Max assist) CN, 100% impaired. 1: Pt supports self with upper extremities but requires therapist assistance.  Pt performs 25-50% of effort (Mod assist) CM, 80% to <100% impaired. 1+: Pt supports self with upper extremities but requires therapist assistance. Pt performs >50% effort. (Min assist). CL, 60% to <80% impaired. 2: Pt supports self independently with both upper extremities. CL, 60% to <80% impaired. 2+: Pt support self independently with 1 upper extremity. CK, 40% to <60% impaired. 3: Pt sits without upper extremity support for up to 30 seconds. CK, 40% to <60% impaired. 3+: Pt sits without upper extremity support for 30 seconds or greater. CJ, 20% to <40% impaired. 4: Pt moves and returns trunkal midpoint 1-2 inches in one plane. CJ, 20% to <40% impaired. 4+: Pt moves and returns trunkal midpoint 1-2 inches in multiple planes. CI, 1% to <20% impaired. 5: Pt moves and returns trunkal midpoint in all planes greater than 2 inches. CH, 0% impaired.      Eval Complexity: History: MEDIUM  Complexity : 1-2 comorbidities / personal factors will impact the outcome/ POC Exam:MEDIUM Complexity : 3 Standardized tests and measures addressing body structure, function, activity limitation and / or participation in recreation  Presentation: MEDIUM Complexity : Evolving with changing characteristics  Clinical Decision Making:Medium Complexity [facility] Sitting Balance Scale 0/5 Overall Complexity:MEDIUM     Prior Level of Function/Home Situation:   Home Situation  Home Environment: Private residence  # Steps to Enter: 3  One/Two Story Residence: One story  Living Alone: No  Support Systems: Child(ed)  Patient Expects to be Discharged to[de-identified] Private residence (daughter's home)  Current DME Used/Available at Home: Walker, rolling  Critical Behavior:  Neurologic State: Alert  Orientation Level: Oriented X4  Cognition: Follows commands  Strength:    Strength: Generally decreased, functional (BLE)  Tone & Sensation:   Tone: Normal  Range Of Motion:  AROM: Grossly decreased, non-functional (BLE; edematous; wounds)  Functional Mobility: patient refused OOB  Bed Mobility:  Rolling: Moderate assistance; Additional time  Supine to Sit: Maximum assistance; Additional time  Sit to Supine: Maximum assistance; Additional time  Transfers:  Balance:   Ambulation/Gait Training:  Therapeutic Exercises:   n/a   Pain:  Pre treatment pain level:  Post treatment pain level:  Pain Scale 1: Numeric (0 - 10)  Pain Intensity 1: 0  Pain Location 1: Foot  Pain Intervention(s) 1: Medication (see MAR)  Activity Tolerance:   Poor  Please refer to the flowsheet for vital signs taken during this treatment. After treatment:   [ ]         Patient left in no apparent distress sitting up in chair  [X]         Patient left in no apparent distress in bed  [X]         Call bell left within reach  [X]         Nursing notified  [ ]         Caregiver present  [ ]         Bed alarm activated      COMMUNICATION/EDUCATION:   [X]         Fall prevention education was provided and the patient/caregiver indicated understanding. [X]         Patient/family have participated as able in goal setting and plan of care. [X]         Patient/family agree to work toward stated goals and plan of care. [ ]         Patient understands intent and goals of therapy, but is neutral about his/her participation. [ ]         Patient is unable to participate in goal setting and plan of care. Patient educated on the role of physical therapy during the acute stay  and the importance of mobility. ETTA.        Thank you for this referral.  Medardo Orellana, PT   Time Calculation: 34 mins

## 2017-05-20 LAB
ANION GAP BLD CALC-SCNC: 7 MMOL/L (ref 3–18)
BASOPHILS # BLD AUTO: 0.1 K/UL (ref 0–0.06)
BASOPHILS # BLD: 1 % (ref 0–2)
BUN SERPL-MCNC: 8 MG/DL (ref 7–18)
BUN/CREAT SERPL: 10 (ref 12–20)
CALCIUM SERPL-MCNC: 8.1 MG/DL (ref 8.5–10.1)
CHLORIDE SERPL-SCNC: 105 MMOL/L (ref 100–108)
CO2 SERPL-SCNC: 26 MMOL/L (ref 21–32)
CREAT SERPL-MCNC: 0.83 MG/DL (ref 0.6–1.3)
DIFFERENTIAL METHOD BLD: ABNORMAL
EOSINOPHIL # BLD: 1 K/UL (ref 0–0.4)
EOSINOPHIL NFR BLD: 10 % (ref 0–5)
ERYTHROCYTE [DISTWIDTH] IN BLOOD BY AUTOMATED COUNT: 13.9 % (ref 11.6–14.5)
GLUCOSE SERPL-MCNC: 80 MG/DL (ref 74–99)
HCT VFR BLD AUTO: 33.3 % (ref 36–48)
HGB BLD-MCNC: 11.1 G/DL (ref 13–16)
INR PPP: 1.7 (ref 0.8–1.2)
LYMPHOCYTES # BLD AUTO: 17 % (ref 21–52)
LYMPHOCYTES # BLD: 1.7 K/UL (ref 0.9–3.6)
MCH RBC QN AUTO: 30.9 PG (ref 24–34)
MCHC RBC AUTO-ENTMCNC: 33.3 G/DL (ref 31–37)
MCV RBC AUTO: 92.8 FL (ref 74–97)
MONOCYTES # BLD: 0.9 K/UL (ref 0.05–1.2)
MONOCYTES NFR BLD AUTO: 9 % (ref 3–10)
NEUTS SEG # BLD: 6.5 K/UL (ref 1.8–8)
NEUTS SEG NFR BLD AUTO: 63 % (ref 40–73)
PLATELET # BLD AUTO: 486 K/UL (ref 135–420)
PMV BLD AUTO: 9.2 FL (ref 9.2–11.8)
POTASSIUM SERPL-SCNC: 4.5 MMOL/L (ref 3.5–5.5)
PROTHROMBIN TIME: 19.5 SEC (ref 11.5–15.2)
RBC # BLD AUTO: 3.59 M/UL (ref 4.7–5.5)
SODIUM SERPL-SCNC: 138 MMOL/L (ref 136–145)
WBC # BLD AUTO: 10.1 K/UL (ref 4.6–13.2)

## 2017-05-20 PROCEDURE — 85610 PROTHROMBIN TIME: CPT | Performed by: HOSPITALIST

## 2017-05-20 PROCEDURE — 74011250636 HC RX REV CODE- 250/636: Performed by: INTERNAL MEDICINE

## 2017-05-20 PROCEDURE — 36415 COLL VENOUS BLD VENIPUNCTURE: CPT | Performed by: HOSPITALIST

## 2017-05-20 PROCEDURE — 65660000000 HC RM CCU STEPDOWN

## 2017-05-20 PROCEDURE — 74011250637 HC RX REV CODE- 250/637: Performed by: INTERNAL MEDICINE

## 2017-05-20 PROCEDURE — 85025 COMPLETE CBC W/AUTO DIFF WBC: CPT | Performed by: HOSPITALIST

## 2017-05-20 PROCEDURE — 74011000258 HC RX REV CODE- 258: Performed by: INTERNAL MEDICINE

## 2017-05-20 PROCEDURE — 77030020263 HC SOL INJ SOD CL0.9% LFCR 1000ML

## 2017-05-20 PROCEDURE — 74011250636 HC RX REV CODE- 250/636: Performed by: HOSPITALIST

## 2017-05-20 PROCEDURE — 80048 BASIC METABOLIC PNL TOTAL CA: CPT | Performed by: HOSPITALIST

## 2017-05-20 RX ADMIN — FUROSEMIDE 20 MG: 20 TABLET ORAL at 17:26

## 2017-05-20 RX ADMIN — CARVEDILOL 12.5 MG: 12.5 TABLET, FILM COATED ORAL at 17:26

## 2017-05-20 RX ADMIN — SODIUM CHLORIDE 1500 MG: 900 INJECTION, SOLUTION INTRAVENOUS at 17:19

## 2017-05-20 RX ADMIN — FUROSEMIDE 20 MG: 20 TABLET ORAL at 09:47

## 2017-05-20 RX ADMIN — ZINC OXIDE: 200 OINTMENT TOPICAL at 11:52

## 2017-05-20 RX ADMIN — CARVEDILOL 12.5 MG: 12.5 TABLET, FILM COATED ORAL at 10:46

## 2017-05-20 RX ADMIN — PIPERACILLIN SODIUM,TAZOBACTAM SODIUM 3.38 G: 3; .375 INJECTION, POWDER, FOR SOLUTION INTRAVENOUS at 06:58

## 2017-05-20 RX ADMIN — Medication 10 ML: at 22:06

## 2017-05-20 RX ADMIN — LISINOPRIL 5 MG: 5 TABLET ORAL at 10:47

## 2017-05-20 RX ADMIN — Medication 9 ML: at 14:00

## 2017-05-20 RX ADMIN — Medication 10 ML: at 06:00

## 2017-05-20 RX ADMIN — SODIUM CHLORIDE 50 ML/HR: 900 INJECTION, SOLUTION INTRAVENOUS at 19:31

## 2017-05-20 RX ADMIN — SODIUM CHLORIDE 75 ML/HR: 900 INJECTION, SOLUTION INTRAVENOUS at 01:29

## 2017-05-20 RX ADMIN — PIPERACILLIN SODIUM,TAZOBACTAM SODIUM 3.38 G: 3; .375 INJECTION, POWDER, FOR SOLUTION INTRAVENOUS at 22:03

## 2017-05-20 RX ADMIN — PIPERACILLIN SODIUM,TAZOBACTAM SODIUM 3.38 G: 3; .375 INJECTION, POWDER, FOR SOLUTION INTRAVENOUS at 16:19

## 2017-05-20 RX ADMIN — SODIUM CHLORIDE 1500 MG: 900 INJECTION, SOLUTION INTRAVENOUS at 04:50

## 2017-05-20 NOTE — ROUTINE PROCESS
Bedside and Verbal shift change report given to Judy TOSCANO RN (oncoming nurse) by Michell Herrmann RN (offgoing nurse). Report included the following information SBAR, Kardex, Intake/Output, MAR and Cardiac Rhythm A-fib. Pt awake, no signs of distress noted. 2030  Bed changed per order with 3 nurses. Pt tolerated well. Weighed 315.7 lbs. 2L NC provided during bed changed. 2041  Pt wants O2 NC off, said he would use it if needed. Will continue to monitor. 0500 Pt request for socks, states that his feet are cold. Explained to him that XL socks may be tight on him and considering his blisters. Pt insisted, socks and adult briefs provided. Pt states they wont fit, but wanted it over his toes. 0730  Bedside and Verbal shift change report given to Alexandra Diego RN (oncoming nurse) by Enrico Etienne RN (offgoing nurse). Report included the following information SBAR, Kardex, Intake/Output, MAR and Cardiac Rhythm A-fib.

## 2017-05-20 NOTE — PROGRESS NOTES
56- Assumed care from [address]. Patient resting quietly in bed easily awoke no c/o pain, no acute distress noted will cont. To monitor. 0900- Tolerated po medications, ate breakfast.    1240- Family members visiting no change in status. 1600- Pt. Resting in bed no acute distress noted. 1920-Bedside and Verbal shift change report given to [address] (oncoming nurse) by Danette Simms RN (offgoing nurse). Report included the following information SBAR, Kardex, MAR and Recent Results.

## 2017-05-20 NOTE — PROGRESS NOTES
Hospitalist Progress Note  Bora Moses MD  Internal medicine/ Hospitalist    Daily Progress Note: 5/20/2017 12:31 PM      Interval history / Subjective:   64 y.old male with h/o CAD,CHF,HTN,varicose veins of lower extremities with ulcer and inflammation,stasis dermatitis,PAD,Biventricular implantable cardioverter-defibrillator in situ,peritoneal metastasis. Came to ED because of lightheadedness and falls. Pt claims that he has h/o low blood pressure. In ED SBP<100. He also reported legs pain and was noted to have cellulitis of legs with ulcers. Patient says that he has been treated several time with legs infections which come and go. He has had multiple procedures with vascular surgeon in the past.He was also noted to have cellulitis of abdominal wall. He was initially started on rocephin and vancomycin. His routine meds were resumed.     Current Facility-Administered Medications   Medication Dose Route Frequency    carvedilol (COREG) tablet 12.5 mg  12.5 mg Oral BID WITH MEALS    furosemide (LASIX) tablet 20 mg  20 mg Oral BID    lisinopril (PRINIVIL, ZESTRIL) tablet 5 mg  5 mg Oral DAILY    zinc oxide 20 % ointment   Topical DAILY    nitroglycerin (NITROSTAT) tablet 0.4 mg  0.4 mg SubLINGual Q5MIN PRN    oxyCODONE-acetaminophen (PERCOCET) 5-325 mg per tablet 1-2 Tab  1-2 Tab Oral Q4H PRN    warfarin (COUMADIN) tablet 7.5 mg  7.5 mg Oral EVERY OTHER DAY    piperacillin-tazobactam (ZOSYN) 3.375 g in 0.9% sodium chloride (MBP/ADV) 100 mL MBP EXTENDED 4-HOUR ###  3.375 g IntraVENous Q8H    sodium chloride (NS) flush 5-10 mL  5-10 mL IntraVENous Q8H    sodium chloride (NS) flush 5-10 mL  5-10 mL IntraVENous PRN    0.9% sodium chloride infusion  75 mL/hr IntraVENous CONTINUOUS    vancomycin (VANCOCIN) 1,500 mg in 0.9% sodium chloride 500 mL IVPB  1,500 mg IntraVENous Q12H    acetaminophen (TYLENOL) tablet 650 mg  650 mg Oral Q6H PRN        Review of Systems  Feeling better today despite legs pain.    Objective:     Visit Vitals    /71 (BP 1 Location: Right arm)    Pulse 73    Temp 98.6 °F (37 °C)    Resp 20    Ht 5' 11\" (1.803 m)    Wt 127 kg (280 lb)    SpO2 96%    BMI 39.05 kg/m2           Temp (24hrs), Av °F (36.7 °C), Min:97.6 °F (36.4 °C), Max:98.6 °F (37 °C)      701 - 1900  In: 240 [P.O.:240]  Out: -   1901 -  0700  In: 1262 [P.O.:1300; I.V.:3950]  Out: 6059 [Urine:4920]      Data Review  Gen: No distress, alert, awake and oriented x 4  HEENT: At/nc,mouth moist  Neck: Supple, No JVD  Lungs: Clear bilaterally, no wheeze, no rales, normal effort  Cardiovascular:S1S2,irreg,irreg  Abdomen:redness lower portion of abdomen. Extremities: Bilateral erythema, redness and warmth below the knees. There are ulcers medial aspect of both legs. Neurological: Awake and alert, CN's are intact, normal strength throughout extremities  Skin: No rashes or moles. Turgor and color normal  Mental Status: Normal thought process, does not appear anxious  Recent Results (from the past 12 hour(s))   PROTHROMBIN TIME + INR    Collection Time: 17  4:40 AM   Result Value Ref Range    Prothrombin time 19.5 (H) 11.5 - 15.2 sec    INR 1.7 (H) 0.8 - 1.2     CBC WITH AUTOMATED DIFF    Collection Time: 17  4:40 AM   Result Value Ref Range    WBC 10.1 4.6 - 13.2 K/uL    RBC 3.59 (L) 4.70 - 5.50 M/uL    HGB 11.1 (L) 13.0 - 16.0 g/dL    HCT 33.3 (L) 36.0 - 48.0 %    MCV 92.8 74.0 - 97.0 FL    MCH 30.9 24.0 - 34.0 PG    MCHC 33.3 31.0 - 37.0 g/dL    RDW 13.9 11.6 - 14.5 %    PLATELET 304 (H) 354 - 420 K/uL    MPV 9.2 9.2 - 11.8 FL    NEUTROPHILS 63 40 - 73 %    LYMPHOCYTES 17 (L) 21 - 52 %    MONOCYTES 9 3 - 10 %    EOSINOPHILS 10 (H) 0 - 5 %    BASOPHILS 1 0 - 2 %    ABS. NEUTROPHILS 6.5 1.8 - 8.0 K/UL    ABS. LYMPHOCYTES 1.7 0.9 - 3.6 K/UL    ABS. MONOCYTES 0.9 0.05 - 1.2 K/UL    ABS. EOSINOPHILS 1.0 (H) 0.0 - 0.4 K/UL    ABS.  BASOPHILS 0.1 (H) 0.0 - 0.06 K/UL    DF AUTOMATED METABOLIC PANEL, BASIC    Collection Time: 05/20/17  4:40 AM   Result Value Ref Range    Sodium 138 136 - 145 mmol/L    Potassium 4.5 3.5 - 5.5 mmol/L    Chloride 105 100 - 108 mmol/L    CO2 26 21 - 32 mmol/L    Anion gap 7 3.0 - 18 mmol/L    Glucose 80 74 - 99 mg/dL    BUN 8 7.0 - 18 MG/DL    Creatinine 0.83 0.6 - 1.3 MG/DL    BUN/Creatinine ratio 10 (L) 12 - 20      GFR est AA >60 >60 ml/min/1.73m2    GFR est non-AA >60 >60 ml/min/1.73m2    Calcium 8.1 (L) 8.5 - 10.1 MG/DL         Assessment/Plan:     Active Problems:    Cellulitis of abdominal wall (5/18/2017)      Cellulitis of both lower extremities (5/18/2017)      PAD    HTN    AFib    CAD    CHF    Biventricular implantable cardioverter-defibrillator in situ. Care Plan  1-Cellulitis legs with ulcers and cellulitis abdominal wall    -Will order vanc and zosyn    -Blcx negative    -Wound care team following  2-Afib    -On coumadin and coreg    -Monitor INR  3-CAD/CHF/HTN    -Resume routine meds  DVT prophylaxis:on coumadin.

## 2017-05-21 LAB
ANION GAP BLD CALC-SCNC: 9 MMOL/L (ref 3–18)
BASOPHILS # BLD AUTO: 0.1 K/UL (ref 0–0.06)
BASOPHILS # BLD: 1 % (ref 0–2)
BUN SERPL-MCNC: 8 MG/DL (ref 7–18)
BUN/CREAT SERPL: 10 (ref 12–20)
CALCIUM SERPL-MCNC: 7.7 MG/DL (ref 8.5–10.1)
CHLORIDE SERPL-SCNC: 103 MMOL/L (ref 100–108)
CO2 SERPL-SCNC: 25 MMOL/L (ref 21–32)
CREAT SERPL-MCNC: 0.84 MG/DL (ref 0.6–1.3)
DIFFERENTIAL METHOD BLD: ABNORMAL
EOSINOPHIL # BLD: 1.2 K/UL (ref 0–0.4)
EOSINOPHIL NFR BLD: 10 % (ref 0–5)
ERYTHROCYTE [DISTWIDTH] IN BLOOD BY AUTOMATED COUNT: 14.1 % (ref 11.6–14.5)
GLUCOSE SERPL-MCNC: 64 MG/DL (ref 74–99)
HCT VFR BLD AUTO: 35 % (ref 36–48)
HGB BLD-MCNC: 11.6 G/DL (ref 13–16)
INR PPP: 1.8 (ref 0.8–1.2)
LYMPHOCYTES # BLD AUTO: 17 % (ref 21–52)
LYMPHOCYTES # BLD: 2 K/UL (ref 0.9–3.6)
MCH RBC QN AUTO: 31 PG (ref 24–34)
MCHC RBC AUTO-ENTMCNC: 33.1 G/DL (ref 31–37)
MCV RBC AUTO: 93.6 FL (ref 74–97)
MONOCYTES # BLD: 0.9 K/UL (ref 0.05–1.2)
MONOCYTES NFR BLD AUTO: 8 % (ref 3–10)
NEUTS SEG # BLD: 7.9 K/UL (ref 1.8–8)
NEUTS SEG NFR BLD AUTO: 64 % (ref 40–73)
PLATELET # BLD AUTO: 478 K/UL (ref 135–420)
PMV BLD AUTO: 9.3 FL (ref 9.2–11.8)
POTASSIUM SERPL-SCNC: 4.4 MMOL/L (ref 3.5–5.5)
PROTHROMBIN TIME: 19.7 SEC (ref 11.5–15.2)
RBC # BLD AUTO: 3.74 M/UL (ref 4.7–5.5)
SODIUM SERPL-SCNC: 137 MMOL/L (ref 136–145)
WBC # BLD AUTO: 12.2 K/UL (ref 4.6–13.2)

## 2017-05-21 PROCEDURE — 85610 PROTHROMBIN TIME: CPT | Performed by: HOSPITALIST

## 2017-05-21 PROCEDURE — 74011250636 HC RX REV CODE- 250/636: Performed by: INTERNAL MEDICINE

## 2017-05-21 PROCEDURE — 74011000258 HC RX REV CODE- 258: Performed by: INTERNAL MEDICINE

## 2017-05-21 PROCEDURE — 77030020263 HC SOL INJ SOD CL0.9% LFCR 1000ML

## 2017-05-21 PROCEDURE — 36415 COLL VENOUS BLD VENIPUNCTURE: CPT | Performed by: HOSPITALIST

## 2017-05-21 PROCEDURE — 74011250636 HC RX REV CODE- 250/636: Performed by: HOSPITALIST

## 2017-05-21 PROCEDURE — 65660000000 HC RM CCU STEPDOWN

## 2017-05-21 PROCEDURE — 80048 BASIC METABOLIC PNL TOTAL CA: CPT | Performed by: HOSPITALIST

## 2017-05-21 PROCEDURE — 74011250637 HC RX REV CODE- 250/637: Performed by: INTERNAL MEDICINE

## 2017-05-21 PROCEDURE — 85025 COMPLETE CBC W/AUTO DIFF WBC: CPT | Performed by: HOSPITALIST

## 2017-05-21 RX ADMIN — Medication 10 ML: at 06:18

## 2017-05-21 RX ADMIN — SODIUM CHLORIDE 1500 MG: 900 INJECTION, SOLUTION INTRAVENOUS at 03:37

## 2017-05-21 RX ADMIN — PIPERACILLIN SODIUM,TAZOBACTAM SODIUM 3.38 G: 3; .375 INJECTION, POWDER, FOR SOLUTION INTRAVENOUS at 06:18

## 2017-05-21 RX ADMIN — PIPERACILLIN SODIUM,TAZOBACTAM SODIUM 3.38 G: 3; .375 INJECTION, POWDER, FOR SOLUTION INTRAVENOUS at 21:43

## 2017-05-21 RX ADMIN — FUROSEMIDE 20 MG: 20 TABLET ORAL at 11:11

## 2017-05-21 RX ADMIN — PIPERACILLIN SODIUM,TAZOBACTAM SODIUM 3.38 G: 3; .375 INJECTION, POWDER, FOR SOLUTION INTRAVENOUS at 14:35

## 2017-05-21 RX ADMIN — FUROSEMIDE 20 MG: 20 TABLET ORAL at 17:20

## 2017-05-21 RX ADMIN — CARVEDILOL 12.5 MG: 12.5 TABLET, FILM COATED ORAL at 17:20

## 2017-05-21 RX ADMIN — Medication 10 ML: at 21:44

## 2017-05-21 RX ADMIN — SODIUM CHLORIDE 50 ML/HR: 900 INJECTION, SOLUTION INTRAVENOUS at 21:43

## 2017-05-21 RX ADMIN — Medication 10 ML: at 17:26

## 2017-05-21 RX ADMIN — CARVEDILOL 12.5 MG: 12.5 TABLET, FILM COATED ORAL at 11:11

## 2017-05-21 RX ADMIN — SODIUM CHLORIDE 1500 MG: 900 INJECTION, SOLUTION INTRAVENOUS at 17:35

## 2017-05-21 RX ADMIN — LISINOPRIL 5 MG: 5 TABLET ORAL at 11:11

## 2017-05-21 RX ADMIN — ZINC OXIDE: 200 OINTMENT TOPICAL at 11:12

## 2017-05-21 RX ADMIN — WARFARIN SODIUM 7.5 MG: 2.5 TABLET ORAL at 14:36

## 2017-05-21 NOTE — ROUTINE PROCESS
1915  Bedside and Verbal shift change report given to Judy TOSCANO RN (oncoming nurse) by Shanelle Keen RN (offgoing nurse). Report included the following information SBAR, Kardex, Intake/Output, MAR. Pt awake, no signs of distress noted.      1940 Shift assessment completed. Pt denies pain, no signs of distress noted. Will continue to monitor. 0105 Pt requesting for his envelope in the safe. States that he have an appt in the a.m. And wants to call the clinic.    0200 Assigned nurse called and spoke to security officers about pt requests. Security officers promised to retrieve his items from the safe and deliver to the pt.     0235  Pt items in the safe delivered to him by security officers. 0446   Pt c/o soreness to PIV site, no signs/symbol of phlebitis noted. New PIV inserted by Damaris GREGG RN. Bedside and Verbal shift change report given to Bernabe GUERRA RN (oncoming nurse) by Mary Jordan RN (offgoing nurse). Report included the following information SBAR, Kardex, Intake/Output, MAR and Cardiac Rhythm A-Fib.

## 2017-05-21 NOTE — PROGRESS NOTES
Hospitalist Progress Note  Armando Wright MD  Internal medicine/ Hospitalist    Daily Progress Note: 5/21/2017 12:31 PM      Interval history / Subjective:   64 y.old male with h/o CAD,CHF,HTN,varicose veins of lower extremities with ulcer and inflammation,stasis dermatitis,PAD,Biventricular implantable cardioverter-defibrillator in situ,peritoneal metastasis. Came to ED because of lightheadedness and falls. Pt claims that he has h/o low blood pressure. In ED SBP<100. He also reported legs pain and was noted to have cellulitis of legs with ulcers. Patient says that he has been treated several time with legs infections which come and go. He has had multiple procedures with vascular surgeon in the past.He was also noted to have cellulitis of abdominal wall. He was initially started on rocephin and vancomycin. His routine meds were resumed.     Current Facility-Administered Medications   Medication Dose Route Frequency    carvedilol (COREG) tablet 12.5 mg  12.5 mg Oral BID WITH MEALS    furosemide (LASIX) tablet 20 mg  20 mg Oral BID    lisinopril (PRINIVIL, ZESTRIL) tablet 5 mg  5 mg Oral DAILY    zinc oxide 20 % ointment   Topical DAILY    nitroglycerin (NITROSTAT) tablet 0.4 mg  0.4 mg SubLINGual Q5MIN PRN    oxyCODONE-acetaminophen (PERCOCET) 5-325 mg per tablet 1-2 Tab  1-2 Tab Oral Q4H PRN    warfarin (COUMADIN) tablet 7.5 mg  7.5 mg Oral EVERY OTHER DAY    piperacillin-tazobactam (ZOSYN) 3.375 g in 0.9% sodium chloride (MBP/ADV) 100 mL MBP EXTENDED 4-HOUR ###  3.375 g IntraVENous Q8H    sodium chloride (NS) flush 5-10 mL  5-10 mL IntraVENous Q8H    sodium chloride (NS) flush 5-10 mL  5-10 mL IntraVENous PRN    0.9% sodium chloride infusion  50 mL/hr IntraVENous CONTINUOUS    vancomycin (VANCOCIN) 1,500 mg in 0.9% sodium chloride 500 mL IVPB  1,500 mg IntraVENous Q12H    acetaminophen (TYLENOL) tablet 650 mg  650 mg Oral Q6H PRN        Review of Systems  Feeling better today despite legs pain.    Objective:     Visit Vitals    BP 92/58 (BP 1 Location: Right arm)    Pulse 61    Temp 97.8 °F (36.6 °C)    Resp 18    Ht 5' 11\" (1.803 m)    Wt 127 kg (280 lb)    SpO2 97%    BMI 39.05 kg/m2           Temp (24hrs), Av °F (36.7 °C), Min:97.8 °F (36.6 °C), Max:98.2 °F (36.8 °C)      701 -  190  In: 240 [P.O.:240]  Out: 2   1901 -  0700  In: 6000.8 [P.O.:1640; I.V.:4360.8]  Out: 3661 [Urine:5170]  P/E  Gen: No distress, alert, awake and oriented x 4  HEENT: At/nc,mouth moist  Neck: Supple, No JVD  Lungs: Clear bilaterally, no wheeze, no rales, normal effort  Cardiovascular:S1S2,irreg,irreg  Abdomen:redness lower portion of abdomen. Extremities: Improved bilateral legs erythema   Neurological: Awake and alert, CN's are intact, normal strength throughout extremities  Skin: No rashes or moles. Turgor and color normal  Mental Status: Normal thought process, does not appear anxious    Data reviewed   Recent Results (from the past 12 hour(s))   PROTHROMBIN TIME + INR    Collection Time: 17  4:00 AM   Result Value Ref Range    Prothrombin time 19.7 (H) 11.5 - 15.2 sec    INR 1.8 (H) 0.8 - 1.2     CBC WITH AUTOMATED DIFF    Collection Time: 17  4:00 AM   Result Value Ref Range    WBC 12.2 4.6 - 13.2 K/uL    RBC 3.74 (L) 4.70 - 5.50 M/uL    HGB 11.6 (L) 13.0 - 16.0 g/dL    HCT 35.0 (L) 36.0 - 48.0 %    MCV 93.6 74.0 - 97.0 FL    MCH 31.0 24.0 - 34.0 PG    MCHC 33.1 31.0 - 37.0 g/dL    RDW 14.1 11.6 - 14.5 %    PLATELET 791 (H) 048 - 420 K/uL    MPV 9.3 9.2 - 11.8 FL    NEUTROPHILS 64 40 - 73 %    LYMPHOCYTES 17 (L) 21 - 52 %    MONOCYTES 8 3 - 10 %    EOSINOPHILS 10 (H) 0 - 5 %    BASOPHILS 1 0 - 2 %    ABS. NEUTROPHILS 7.9 1.8 - 8.0 K/UL    ABS. LYMPHOCYTES 2.0 0.9 - 3.6 K/UL    ABS. MONOCYTES 0.9 0.05 - 1.2 K/UL    ABS. EOSINOPHILS 1.2 (H) 0.0 - 0.4 K/UL    ABS.  BASOPHILS 0.1 (H) 0.0 - 0.06 K/UL    DF AUTOMATED     METABOLIC PANEL, BASIC    Collection Time: 17 4:00 AM   Result Value Ref Range    Sodium 137 136 - 145 mmol/L    Potassium 4.4 3.5 - 5.5 mmol/L    Chloride 103 100 - 108 mmol/L    CO2 25 21 - 32 mmol/L    Anion gap 9 3.0 - 18 mmol/L    Glucose 64 (L) 74 - 99 mg/dL    BUN 8 7.0 - 18 MG/DL    Creatinine 0.84 0.6 - 1.3 MG/DL    BUN/Creatinine ratio 10 (L) 12 - 20      GFR est AA >60 >60 ml/min/1.73m2    GFR est non-AA >60 >60 ml/min/1.73m2    Calcium 7.7 (L) 8.5 - 10.1 MG/DL         Assessment/Plan:     Active Problems:    Cellulitis of abdominal wall (5/18/2017)      Cellulitis of both lower extremities (5/18/2017)      PAD    HTN    AFib    CAD    CHF    Biventricular implantable cardioverter-defibrillator in situ. Care Plan  1-Cellulitis legs with ulcers and cellulitis abdominal wall:improved    -Will order vanc and zosyn    -Blcx negative    -Wound care team following  2-Afib    -On coumadin and coreg    -Monitor INR  3-CAD/CHF/HTN    -Resume routine meds  DVT prophylaxis:on coumadin. Disposition:5/22, will talk to family as patient is in the process of moving in with sister.

## 2017-05-21 NOTE — ROUTINE PROCESS
1930  Bedside and Verbal shift change report given to Judy TOSCANO RN (oncoming nurse) by Tia Nichols RN (offgoing nurse). Report included the following information SBAR, Kardex, Intake/Output, MAR and Cardiac Rhythm A-fib. Pt awake, no signs of distress noted. 1950  Shift assessment completed. Pt denies pain, no signs of distress noted. Will continue to monitor. 0250  Pt awake most part of the night watching sports channel. 0420  Resting quietly. 0511  Pt sleeping. Will continue to monitor. 9275  Requested for a bedpan. See doc flowsheet. 0864  Bedside and Verbal shift change report given to Joanne Bill (oncoming nurse) by Ceasar Sutherland RN (offgoing nurse). Report included the following information SBAR, Kardex, Intake/Output, MAR and Cardiac Rhythm A-fib.

## 2017-05-21 NOTE — PROGRESS NOTES
2819- Assume patient care. Pt. Lying in bed awake alert and oriented no acute distress noted will continue to monitor. 1000- Pt. Tolerated his po meds. 1240- Resting in bed no change in statu. 1600- Pt. Washed up and cleaned gown changed. 1800- In bed resting no change in status. 1920-Bedside and Verbal shift change report given to Servando [address] Ivan Pena (oncoming nurse) by Bucky Brown RN (offgoing nurse). Report included the following information SBAR, Kardex, ED Summary, [facility] and Recent Results.

## 2017-05-22 LAB
ANION GAP BLD CALC-SCNC: 8 MMOL/L (ref 3–18)
BASOPHILS # BLD AUTO: 0.1 K/UL (ref 0–0.06)
BASOPHILS # BLD: 1 % (ref 0–2)
BUN SERPL-MCNC: 8 MG/DL (ref 7–18)
BUN/CREAT SERPL: 10 (ref 12–20)
CALCIUM SERPL-MCNC: 8.3 MG/DL (ref 8.5–10.1)
CHLORIDE SERPL-SCNC: 102 MMOL/L (ref 100–108)
CO2 SERPL-SCNC: 28 MMOL/L (ref 21–32)
CREAT SERPL-MCNC: 0.84 MG/DL (ref 0.6–1.3)
DIFFERENTIAL METHOD BLD: ABNORMAL
EOSINOPHIL # BLD: 1.4 K/UL (ref 0–0.4)
EOSINOPHIL NFR BLD: 12 % (ref 0–5)
ERYTHROCYTE [DISTWIDTH] IN BLOOD BY AUTOMATED COUNT: 14 % (ref 11.6–14.5)
GLUCOSE SERPL-MCNC: 73 MG/DL (ref 74–99)
HCT VFR BLD AUTO: 33.6 % (ref 36–48)
HGB BLD-MCNC: 11.2 G/DL (ref 13–16)
INR PPP: 1.8 (ref 0.8–1.2)
LYMPHOCYTES # BLD AUTO: 18 % (ref 21–52)
LYMPHOCYTES # BLD: 2.1 K/UL (ref 0.9–3.6)
MCH RBC QN AUTO: 31.3 PG (ref 24–34)
MCHC RBC AUTO-ENTMCNC: 33.3 G/DL (ref 31–37)
MCV RBC AUTO: 93.9 FL (ref 74–97)
MONOCYTES # BLD: 0.9 K/UL (ref 0.05–1.2)
MONOCYTES NFR BLD AUTO: 8 % (ref 3–10)
NEUTS SEG # BLD: 7.2 K/UL (ref 1.8–8)
NEUTS SEG NFR BLD AUTO: 61 % (ref 40–73)
PLATELET # BLD AUTO: 496 K/UL (ref 135–420)
PMV BLD AUTO: 8.9 FL (ref 9.2–11.8)
POTASSIUM SERPL-SCNC: 4.3 MMOL/L (ref 3.5–5.5)
PROTHROMBIN TIME: 19.9 SEC (ref 11.5–15.2)
RBC # BLD AUTO: 3.58 M/UL (ref 4.7–5.5)
SODIUM SERPL-SCNC: 138 MMOL/L (ref 136–145)
WBC # BLD AUTO: 11.7 K/UL (ref 4.6–13.2)

## 2017-05-22 PROCEDURE — 36415 COLL VENOUS BLD VENIPUNCTURE: CPT | Performed by: HOSPITALIST

## 2017-05-22 PROCEDURE — 74011250637 HC RX REV CODE- 250/637: Performed by: INTERNAL MEDICINE

## 2017-05-22 PROCEDURE — 74011250636 HC RX REV CODE- 250/636: Performed by: INTERNAL MEDICINE

## 2017-05-22 PROCEDURE — 74011250636 HC RX REV CODE- 250/636: Performed by: HOSPITALIST

## 2017-05-22 PROCEDURE — 77030033263 HC DRSG MEPILEX 16-48IN BORD MOLN -B

## 2017-05-22 PROCEDURE — 80048 BASIC METABOLIC PNL TOTAL CA: CPT | Performed by: HOSPITALIST

## 2017-05-22 PROCEDURE — 65660000000 HC RM CCU STEPDOWN

## 2017-05-22 PROCEDURE — 85025 COMPLETE CBC W/AUTO DIFF WBC: CPT | Performed by: HOSPITALIST

## 2017-05-22 PROCEDURE — 74011000258 HC RX REV CODE- 258: Performed by: INTERNAL MEDICINE

## 2017-05-22 PROCEDURE — 85610 PROTHROMBIN TIME: CPT | Performed by: HOSPITALIST

## 2017-05-22 RX ORDER — WARFARIN 4 MG/1
8 TABLET ORAL EVERY OTHER DAY
Status: DISCONTINUED | OUTPATIENT
Start: 2017-05-23 | End: 2017-05-24 | Stop reason: HOSPADM

## 2017-05-22 RX ORDER — VANCOMYCIN HYDROCHLORIDE 750 MG/15ML
1500 INJECTION, POWDER, LYOPHILIZED, FOR SOLUTION INTRAVENOUS ONCE
Status: COMPLETED | OUTPATIENT
Start: 2017-05-22 | End: 2017-05-22

## 2017-05-22 RX ORDER — FUROSEMIDE 20 MG/1
20 TABLET ORAL DAILY
Status: DISCONTINUED | OUTPATIENT
Start: 2017-05-23 | End: 2017-05-23

## 2017-05-22 RX ORDER — SODIUM CHLORIDE 9 MG/ML
250 INJECTION, SOLUTION INTRAVENOUS ONCE
Status: COMPLETED | OUTPATIENT
Start: 2017-05-22 | End: 2017-05-22

## 2017-05-22 RX ADMIN — SODIUM CHLORIDE 50 ML/HR: 900 INJECTION, SOLUTION INTRAVENOUS at 19:13

## 2017-05-22 RX ADMIN — PIPERACILLIN SODIUM,TAZOBACTAM SODIUM 3.38 G: 3; .375 INJECTION, POWDER, FOR SOLUTION INTRAVENOUS at 21:53

## 2017-05-22 RX ADMIN — PIPERACILLIN SODIUM,TAZOBACTAM SODIUM 3.38 G: 3; .375 INJECTION, POWDER, FOR SOLUTION INTRAVENOUS at 05:56

## 2017-05-22 RX ADMIN — FUROSEMIDE 20 MG: 20 TABLET ORAL at 08:45

## 2017-05-22 RX ADMIN — SODIUM CHLORIDE 250 ML: 900 INJECTION, SOLUTION INTRAVENOUS at 10:15

## 2017-05-22 RX ADMIN — LISINOPRIL 5 MG: 5 TABLET ORAL at 08:45

## 2017-05-22 RX ADMIN — VANCOMYCIN HYDROCHLORIDE 1500 MG: 750 INJECTION, POWDER, LYOPHILIZED, FOR SOLUTION INTRAVENOUS at 04:55

## 2017-05-22 RX ADMIN — SODIUM CHLORIDE 1500 MG: 900 INJECTION, SOLUTION INTRAVENOUS at 16:43

## 2017-05-22 RX ADMIN — CARVEDILOL 12.5 MG: 12.5 TABLET, FILM COATED ORAL at 16:44

## 2017-05-22 RX ADMIN — ZINC OXIDE: 200 OINTMENT TOPICAL at 10:58

## 2017-05-22 RX ADMIN — PIPERACILLIN SODIUM,TAZOBACTAM SODIUM 3.38 G: 3; .375 INJECTION, POWDER, FOR SOLUTION INTRAVENOUS at 14:40

## 2017-05-22 RX ADMIN — Medication 10 ML: at 05:56

## 2017-05-22 RX ADMIN — CARVEDILOL 12.5 MG: 12.5 TABLET, FILM COATED ORAL at 08:45

## 2017-05-22 RX ADMIN — Medication 10 ML: at 14:41

## 2017-05-22 RX ADMIN — Medication 10 ML: at 21:53

## 2017-05-22 NOTE — MANAGEMENT PLAN
Discharge Plan     1015:Received voicemail from daughter asking for [facility]. Pt does not have Medicaid listed. Reviewed chart; pt would really benefit from SNF rehab. Believe home is not safe disposition in patient current state of non-mobility. Went to room and discussed with patient. Pt does not want to go to SNF rehab and states he will discuss with daughter. Asked pt if I can call daughter back and he states I can only talk to her after he speaks with her. Pt is moving in with daughter. She has gotten him a bed. 1030: Daughter called. She states her father just spoke with her. She agrees on SNF rehab and will try convince father today. Will go to room to speak with pt when daughter arrives. Daughter lives in [place] and would like rehab in that area. She states father does not have Medicaid. He gets $1200-$1300 a month. Will email Dayan ROLLINS. He may be monthly limit. 1030: Spoke with Ms Becky Johnson. Pt is over monthly budget, but she states he may qualify for Medicare program that covers his Part B  1130: Pt now agreeable to SNF rehab. Matched to [place] locations in [place] and [place].  Pt has [employer] Medicare and will require auth and updated PT/OT notes in day  1400: Gave pt/daughter SNF rehab list. Top choices are [place] and [place] based on location, but he is fine with any [facility] location    Rimma Zuniga # [phone]

## 2017-05-22 NOTE — PROGRESS NOTES
Hospitalist Progress Note    Internal medicine/ Hospitalist    Daily Progress Note: 5/22/2017 12:31 PM      Interval history / Subjective:   64 y.old male with h/o CAD,CHF,HTN,varicose veins of lower extremities with ulcer and inflammation,stasis dermatitis,PAD,Biventricular implantable cardioverter-defibrillator in situ,peritoneal metastasis. Came to ED because of lightheadedness and falls. Pt claims that he has h/o low blood pressure. In ED SBP<100. He also reported legs pain and was noted to have cellulitis of legs with ulcers. Patient says that he has been treated several time with legs infections which come and go. He has had multiple procedures with vascular surgeon in the past.He was also noted to have cellulitis of abdominal wall. He was initially started on rocephin and vancomycin. His routine meds were resumed. Will likely dc tomorrow with oral abx.   Most of the concerns on the legs look chronic he will need good wound care    Current Facility-Administered Medications   Medication Dose Route Frequency    [START ON 5/23/2017] VANCOMYCIN INFORMATION NOTE   Other ONCE    [START ON 5/23/2017] furosemide (LASIX) tablet 20 mg  20 mg Oral DAILY    [START ON 5/23/2017] warfarin (COUMADIN) tablet 8 mg  8 mg Oral EVERY OTHER DAY    carvedilol (COREG) tablet 12.5 mg  12.5 mg Oral BID WITH MEALS    zinc oxide 20 % ointment   Topical DAILY    nitroglycerin (NITROSTAT) tablet 0.4 mg  0.4 mg SubLINGual Q5MIN PRN    oxyCODONE-acetaminophen (PERCOCET) 5-325 mg per tablet 1-2 Tab  1-2 Tab Oral Q4H PRN    piperacillin-tazobactam (ZOSYN) 3.375 g in 0.9% sodium chloride (MBP/ADV) 100 mL MBP EXTENDED 4-HOUR ###  3.375 g IntraVENous Q8H    sodium chloride (NS) flush 5-10 mL  5-10 mL IntraVENous Q8H    sodium chloride (NS) flush 5-10 mL  5-10 mL IntraVENous PRN    0.9% sodium chloride infusion  50 mL/hr IntraVENous CONTINUOUS    vancomycin (VANCOCIN) 1,500 mg in 0.9% sodium chloride 500 mL IVPB  1,500 mg IntraVENous Q12H  acetaminophen (TYLENOL) tablet 650 mg  650 mg Oral Q6H PRN        Objective:     Visit Vitals    BP 97/59  Comment: after 250 cc bolus    Pulse 95    Temp 97.8 °F (36.6 °C)    Resp 20    Ht 5' 11\" (1.803 m)    Wt 127 kg (280 lb)    SpO2 94%    BMI 39.05 kg/m2           Temp (24hrs), Av.8 °F (36.6 °C), Min:97.8 °F (36.6 °C), Max:97.9 °F (36.6 °C)       07 - 1900  In: 480 [P.O.:480]  Out: 1200 [Urine:1200]  1901 -  0700  In: 4409.2 [P.O.:1210; I.V.:3199.2]  Out: 1148 [Urine:6980]  P/E  Gen: No distress, alert, awake and oriented x 4  HEENT: At/nc,mouth moist  Neck: Supple, No JVD  Lungs: Clear bilaterally, no wheeze, no rales, normal effort  Cardiovascular:S1S2,irreg,irreg  Abdomen:redness lower portion of abdomen. Extremities: Improved bilateral legs erythema this erythema is probably mostly chronic given his edema, obesity and poor healing. Neurological: Awake and alert, CN's are intact, normal strength throughout extremities  Skin: No rashes or moles. Turgor and color normal  Mental Status: Normal thought process, does not appear anxious    Data reviewed   Recent Results (from the past 12 hour(s))   PROTHROMBIN TIME + INR    Collection Time: 17  4:50 AM   Result Value Ref Range    Prothrombin time 19.9 (H) 11.5 - 15.2 sec    INR 1.8 (H) 0.8 - 1.2     CBC WITH AUTOMATED DIFF    Collection Time: 17  4:50 AM   Result Value Ref Range    WBC 11.7 4.6 - 13.2 K/uL    RBC 3.58 (L) 4.70 - 5.50 M/uL    HGB 11.2 (L) 13.0 - 16.0 g/dL    HCT 33.6 (L) 36.0 - 48.0 %    MCV 93.9 74.0 - 97.0 FL    MCH 31.3 24.0 - 34.0 PG    MCHC 33.3 31.0 - 37.0 g/dL    RDW 14.0 11.6 - 14.5 %    PLATELET 173 (H) 231 - 420 K/uL    MPV 8.9 (L) 9.2 - 11.8 FL    NEUTROPHILS 61 40 - 73 %    LYMPHOCYTES 18 (L) 21 - 52 %    MONOCYTES 8 3 - 10 %    EOSINOPHILS 12 (H) 0 - 5 %    BASOPHILS 1 0 - 2 %    ABS. NEUTROPHILS 7.2 1.8 - 8.0 K/UL    ABS. LYMPHOCYTES 2.1 0.9 - 3.6 K/UL    ABS.  MONOCYTES 0.9 0.05 - 1.2 K/UL    ABS. EOSINOPHILS 1.4 (H) 0.0 - 0.4 K/UL    ABS. BASOPHILS 0.1 (H) 0.0 - 0.06 K/UL    DF AUTOMATED     METABOLIC PANEL, BASIC    Collection Time: 05/22/17  4:50 AM   Result Value Ref Range    Sodium 138 136 - 145 mmol/L    Potassium 4.3 3.5 - 5.5 mmol/L    Chloride 102 100 - 108 mmol/L    CO2 28 21 - 32 mmol/L    Anion gap 8 3.0 - 18 mmol/L    Glucose 73 (L) 74 - 99 mg/dL    BUN 8 7.0 - 18 MG/DL    Creatinine 0.84 0.6 - 1.3 MG/DL    BUN/Creatinine ratio 10 (L) 12 - 20      GFR est AA >60 >60 ml/min/1.73m2    GFR est non-AA >60 >60 ml/min/1.73m2    Calcium 8.3 (L) 8.5 - 10.1 MG/DL         Assessment/Plan:     Active Problems:    Cellulitis of abdominal wall (5/18/2017)      Cellulitis of both lower extremities (5/18/2017)      PAD    HTN    AFib    CAD    CHF    Biventricular implantable cardioverter-defibrillator in situ. Care Plan  1-Cellulitis legs with ulcers and cellulitis abdominal wall:improved    - vanc and zosyn    -Blcx negative    -Wound care team following  2-Afib    -On coumadin and coreg    -Monitor INR  3-CAD/CHF/HTN    -Resume routine meds  DVT prophylaxis:on coumadin.   Disposition:5/23,SNF

## 2017-05-23 LAB
ANION GAP BLD CALC-SCNC: 8 MMOL/L (ref 3–18)
BUN SERPL-MCNC: 8 MG/DL (ref 7–18)
BUN/CREAT SERPL: 8 (ref 12–20)
CALCIUM SERPL-MCNC: 8.4 MG/DL (ref 8.5–10.1)
CHLORIDE SERPL-SCNC: 102 MMOL/L (ref 100–108)
CO2 SERPL-SCNC: 28 MMOL/L (ref 21–32)
CREAT SERPL-MCNC: 1.01 MG/DL (ref 0.6–1.3)
DATE LAST DOSE: ABNORMAL
GLUCOSE SERPL-MCNC: 77 MG/DL (ref 74–99)
INR PPP: 1.8 (ref 0.8–1.2)
POTASSIUM SERPL-SCNC: 3.9 MMOL/L (ref 3.5–5.5)
PROTHROMBIN TIME: 20 SEC (ref 11.5–15.2)
REPORTED DOSE,DOSE: ABNORMAL UNITS
REPORTED DOSE/TIME,TMG: 400
SODIUM SERPL-SCNC: 138 MMOL/L (ref 136–145)
VANCOMYCIN TROUGH SERPL-MCNC: 26.5 UG/ML (ref 10–20)

## 2017-05-23 PROCEDURE — 80202 ASSAY OF VANCOMYCIN: CPT | Performed by: HOSPITALIST

## 2017-05-23 PROCEDURE — 80048 BASIC METABOLIC PNL TOTAL CA: CPT | Performed by: HOSPITALIST

## 2017-05-23 PROCEDURE — 74011250636 HC RX REV CODE- 250/636: Performed by: INTERNAL MEDICINE

## 2017-05-23 PROCEDURE — 97165 OT EVAL LOW COMPLEX 30 MIN: CPT

## 2017-05-23 PROCEDURE — 65660000000 HC RM CCU STEPDOWN

## 2017-05-23 PROCEDURE — 85610 PROTHROMBIN TIME: CPT | Performed by: HOSPITALIST

## 2017-05-23 PROCEDURE — 74011250636 HC RX REV CODE- 250/636: Performed by: HOSPITALIST

## 2017-05-23 PROCEDURE — 74011250637 HC RX REV CODE- 250/637: Performed by: INTERNAL MEDICINE

## 2017-05-23 PROCEDURE — 36415 COLL VENOUS BLD VENIPUNCTURE: CPT | Performed by: HOSPITALIST

## 2017-05-23 PROCEDURE — 97530 THERAPEUTIC ACTIVITIES: CPT

## 2017-05-23 PROCEDURE — 74011000258 HC RX REV CODE- 258: Performed by: INTERNAL MEDICINE

## 2017-05-23 PROCEDURE — 74011250637 HC RX REV CODE- 250/637: Performed by: HOSPITALIST

## 2017-05-23 RX ORDER — FUROSEMIDE 40 MG/1
40 TABLET ORAL DAILY
Status: DISCONTINUED | OUTPATIENT
Start: 2017-05-24 | End: 2017-05-24 | Stop reason: HOSPADM

## 2017-05-23 RX ADMIN — PIPERACILLIN SODIUM,TAZOBACTAM SODIUM 3.38 G: 3; .375 INJECTION, POWDER, FOR SOLUTION INTRAVENOUS at 21:45

## 2017-05-23 RX ADMIN — Medication 5 ML: at 17:00

## 2017-05-23 RX ADMIN — SODIUM CHLORIDE 1250 MG: 900 INJECTION, SOLUTION INTRAVENOUS at 23:55

## 2017-05-23 RX ADMIN — CARVEDILOL 12.5 MG: 12.5 TABLET, FILM COATED ORAL at 17:00

## 2017-05-23 RX ADMIN — PIPERACILLIN SODIUM,TAZOBACTAM SODIUM 3.38 G: 3; .375 INJECTION, POWDER, FOR SOLUTION INTRAVENOUS at 14:15

## 2017-05-23 RX ADMIN — CARVEDILOL 12.5 MG: 12.5 TABLET, FILM COATED ORAL at 08:00

## 2017-05-23 RX ADMIN — ZINC OXIDE 1 G: 200 OINTMENT TOPICAL at 09:00

## 2017-05-23 RX ADMIN — PIPERACILLIN SODIUM,TAZOBACTAM SODIUM 3.38 G: 3; .375 INJECTION, POWDER, FOR SOLUTION INTRAVENOUS at 06:53

## 2017-05-23 RX ADMIN — FUROSEMIDE 20 MG: 20 TABLET ORAL at 09:00

## 2017-05-23 RX ADMIN — SODIUM CHLORIDE 1500 MG: 900 INJECTION, SOLUTION INTRAVENOUS at 04:39

## 2017-05-23 RX ADMIN — Medication 10 ML: at 21:45

## 2017-05-23 RX ADMIN — WARFARIN SODIUM 8 MG: 4 TABLET ORAL at 14:15

## 2017-05-23 RX ADMIN — Medication 10 ML: at 06:53

## 2017-05-23 NOTE — MANAGEMENT PLAN
Discharge Plan    1030Pt now wants [facility] for SNF rehab. Matched in [place]. Needs [employer] Auth when medically stable  1345: [facility] pending [employer] Medicare authElliott Lopez [facility]  Will know tomorrow    Laura Nava RN BSN  Outcomes Manager  Pager # [phone]

## 2017-05-23 NOTE — PROGRESS NOTES
Hospitalist Progress Note    Internal medicine/ Hospitalist    Daily Progress Note: 5/23/2017 12:31 PM      Interval history / Subjective:   64 y.old male with h/o CAD,CHF,HTN,varicose veins of lower extremities with ulcer and inflammation,stasis dermatitis,PAD,Biventricular implantable cardioverter-defibrillator in situ,peritoneal metastasis. Came to ED because of lightheadedness and falls. Pt claims that he has h/o low blood pressure. In ED SBP<100. He also reported legs pain and was noted to have cellulitis of legs with ulcers. Patient says that he has been treated several time with legs infections which come and go. He has had multiple procedures with vascular surgeon in the past.He was also noted to have cellulitis of abdominal wall. He was initially started on rocephin and vancomycin. His routine meds were resumed. Will likely dc tomorrow with oral abx.   Most of the concerns on the legs look chronic he will need good wound care    No complaints, NAD    Current Facility-Administered Medications   Medication Dose Route Frequency    [START ON 5/24/2017] furosemide (LASIX) tablet 40 mg  40 mg Oral DAILY    VANCOMYCIN INFORMATION NOTE   Other ONCE    warfarin (COUMADIN) tablet 8 mg  8 mg Oral EVERY OTHER DAY    carvedilol (COREG) tablet 12.5 mg  12.5 mg Oral BID WITH MEALS    zinc oxide 20 % ointment   Topical DAILY    nitroglycerin (NITROSTAT) tablet 0.4 mg  0.4 mg SubLINGual Q5MIN PRN    oxyCODONE-acetaminophen (PERCOCET) 5-325 mg per tablet 1-2 Tab  1-2 Tab Oral Q4H PRN    piperacillin-tazobactam (ZOSYN) 3.375 g in 0.9% sodium chloride (MBP/ADV) 100 mL MBP EXTENDED 4-HOUR ###  3.375 g IntraVENous Q8H    sodium chloride (NS) flush 5-10 mL  5-10 mL IntraVENous Q8H    sodium chloride (NS) flush 5-10 mL  5-10 mL IntraVENous PRN    vancomycin (VANCOCIN) 1,500 mg in 0.9% sodium chloride 500 mL IVPB  1,500 mg IntraVENous Q12H    acetaminophen (TYLENOL) tablet 650 mg  650 mg Oral Q6H PRN        Objective: Visit Vitals    /58    Pulse 81    Temp 97.9 °F (36.6 °C)    Resp 24    Ht 5' 11\" (1.803 m)    Wt 127 kg (280 lb)    SpO2 96%    BMI 39.05 kg/m2      O2 Device: Room air    Temp (24hrs), Av.1 °F (36.7 °C), Min:97.9 °F (36.6 °C), Max:98.2 °F (36.8 °C)          1901 -  0700  In: 4054 [P.O.:1570; I.V.:3700]  Out: 4180 [Urine:4180]  P/E  Gen: No distress, alert, awake and oriented x 4  HEENT: At/nc,mouth moist  Neck: Supple, No JVD  Lungs: Clear bilaterally, no wheeze, no rales, normal effort  Cardiovascular:S1S2,irreg,irreg  Abdomen:redness lower portion of abdomen. Extremities: Improved bilateral legs erythema this erythema is probably mostly chronic given his edema, obesity and poor healing. Neurological: Awake and alert, CN's are intact, normal strength throughout extremities  Skin: No rashes or moles. Turgor and color normal  Mental Status: Normal thought process, does not appear anxious    Data reviewed   Recent Results (from the past 12 hour(s))   PROTHROMBIN TIME + INR    Collection Time: 17  4:37 AM   Result Value Ref Range    Prothrombin time 20.0 (H) 11.5 - 15.2 sec    INR 1.8 (H) 0.8 - 1.2           Assessment/Plan:     Active Problems:    Cellulitis of abdominal wall (2017)      Cellulitis of both lower extremities (2017)      PAD    HTN    AFib    CAD    CHF    Biventricular implantable cardioverter-defibrillator in situ. Care Plan  1-Cellulitis legs with ulcers and cellulitis abdominal wall:improved    - vanc and zosyn    -Blcx negative    -Wound care team following  2-Afib    -On coumadin and coreg    -Monitor INR  3-CAD/CHF/HTN    -Resume routine meds  DVT prophylaxis:on coumadin.   Disposition: [facility]

## 2017-05-23 NOTE — ROUTINE PROCESS
Bedside and Verbal shift change report given to Aba Putnam RN (oncoming nurse) by Bret Travis rn (offgoing nurse). Report included the following information SBAR, Kardex and MAR.

## 2017-05-23 NOTE — PROGRESS NOTES
conducted a Follow up consultation and Spiritual Assessment for Demetrios Jeans, who is a 64 y.o.,male. The  provided the following Interventions:  Continued the relationship of care and support. Listened empathically. Offered prayer and assurance of continued prayer on patients behalf. Chart reviewed. The following outcomes were achieved:  Patient expressed gratitude for pastoral care visit. Assessment:  There are no further spiritual or Worship issues which require Spiritual Care Services interventions at this time. Plan:  Chaplains will continue to follow and will provide pastoral care on an as needed/requested basis.  recommends bedside caregivers page  on duty if patient shows signs of acute spiritual or emotional distress.      [address] [facility]   Staff [address] [facility]   [phone]

## 2017-05-23 NOTE — PROGRESS NOTES
Problem: Self Care Deficits Care Plan (Adult)  Goal: *Acute Goals and Plan of Care (Insert Text)  Occupational Therapy Goals  Initiated 5/23/2017 within 7 day(s). 1. Patient will perform grooming tasks while standing with supervision for balance. 2. Patient will perform lower body dressing with modified independence utilizing AE/adaptive strategies  3. Patient will perform functional task in standing for 5 minutes with supervision for balance to increase activity tolerance for ADLs. 4. Patient will perform toilet transfers with supervision/set-up. 5. Patient will perform all aspects of toileting with supervision/set-up. 6. Patient will participate in upper extremity therapeutic exercise/activities with supervision/set-up for 8 minutes to increase/maintain strength of BUEs for ADLs. 7. Patient will utilize energy conservation techniques during functional activities with minimal verbal cues. Outcome: Progressing Towards Goal  OCCUPATIONAL THERAPY EVALUATION     Patient: Yimi Newton (77 y.o. male)  Date: 5/23/2017  Primary Diagnosis: Cellulitis of both lower extremities  Cellulitis of abdominal wall        Precautions: Fall      ASSESSMENT :  Based on the objective data described below, the patient presents with impairments with regard to activity tolerance, BUE function/strength, functional mobility, and overall independence in ADLs. Pt up in chair on arrival; no complaints of pain. Pt very talkative t/o session; requires re-direction to task at hand. Full AROM/strength of BUEs. Pt stood with min A/additional time; slight LOB but able to regain balance with CGA. Pt engaged in standing activity for approx 2 mins in preparation for grooming tasks at sink; pt reported his baseline is standing for 10 mins at a time prior to needing a rest break. Pt educated on role of OT and POC; to address activity tolerance and level of independence in ADLs. Pt verbalized understanding and motivation.  Min A to return to chair with education on pacing and body positioning for safety. Pt left up in chair with needs within reach. Patient will benefit from skilled intervention to address the above impairments. Patients rehabilitation potential is considered to be Good  Factors which may influence rehabilitation potential include:   [ ]             None noted  [ ]             Mental ability/status  [X]             Medical condition  [ ]             Home/family situation and support systems  [ ]             Safety awareness  [ ]             Pain tolerance/management  [ ]             Other:           PLAN :  Recommendations and Planned Interventions:  [X]               Self Care Training                  [X]        Therapeutic Activities  [X]               Functional Mobility Training    [ ]        Cognitive Retraining  [X]               Therapeutic Exercises           [X]        Endurance Activities  [X]               Balance Training                   [ ]        Neuromuscular Re-Education  [ ]               Visual/Perceptual Training     [X]   Home Safety Training  [X]               Patient Education                 [X]        Family Training/Education  [ ]               Other (comment):     Frequency/Duration: Patient will be followed by occupational therapy 3-5 times a week to address goals. Discharge Recommendations: Rehab  Further Equipment Recommendations for Discharge: bedside commode, shower chair        SUBJECTIVE:   Patient stated I'm willing to work with therapy. \"      OBJECTIVE DATA SUMMARY:       Past Medical History:   Diagnosis Date    CAD (coronary artery disease)      Heart failure ([facility] [address]      Hypertension       Past Surgical History:   Procedure Laterality Date    HX PACEMAKER         Barriers to Learning/Limitations: None  Compensate with: visual, verbal, tactile, kinesthetic cues/model     GCODES:  Self Care  Current  CL= 60-79%   Goal  CI= 1-19%.   The severity rating is based on the Other Functional Assessment, MMT, ROM     Eval Complexity: History: LOW Complexity : Brief history review ; Examination: LOW Complexity : 1-3 performance deficits relating to physical, cognitive , or psychosocial skils that result in activity limitations and / or participation restrictions ; Decision Making:LOW Complexity : No comorbidities that affect functional and no verbal or physical assistance needed to complete eval tasks      Prior Level of Function/Home Situation: Pt was independent with basic self care tasks and functional mobility PTA. Home Situation  Home Environment: Private residence  # Steps to Enter: 3  Rails to Enter: Yes  Hand Rails : Bilateral  One/Two Story Residence: One story  Living Alone: Yes (Plans to move in with daughter after hospital stay)  Support Systems: Child(ed)  Patient Expects to be Discharged to[de-identified] Rehabilitation facility  Current DME Used/Available at Home: Walker, rolling  Tub or Shower Type: Tub/Shower combination (no grab bars or shower chair)  [X]  Right hand dominant          [ ]  Left hand dominant     Cognitive/Behavioral Status:  Neurologic State: Alert  Orientation Level: Oriented X4  Cognition: Appropriate decision making; Appropriate safety awareness; Follows commands  Safety/Judgement: Awareness of environment; Fall prevention      Skin: intact (BUEs)  Edema: None noted (BUEs)     Vision/Perceptual:    Acuity: Within Defined Limits       Coordination:  Coordination: Within functional limits (BUEs)  Fine Motor Skills-Upper: Right Intact; Left Intact    Gross Motor Skills-Upper: Right Intact; Left Intact      Balance:  Sitting: Intact  Standing: Impaired; With support  Standing - Static: Fair (Fair+)  Standing - Dynamic : Fair     Strength:  Strength:  Within functional limits (BUEs: 5/5)     Tone & Sensation:  Tone: Normal (BUEs)  Sensation: Intact (BUEs)     Range of Motion:  AROM: Within functional limits (BUEs: full shoulder/elbow flexion)  PROM: Within functional limits (BUEs)     Functional Mobility and Transfers for ADLs:  Bed Mobility:  Supine to Sit:  (not assessed; pt up in chair on arrival)     Transfers:  Sit to Stand: Minimum assistance; Additional time              Toilet Transfer :  (not assessed; pt declined)                ADL Assessment:  Feeding: Setup  Oral Facial Hygiene/Grooming: Setup;Supervision  Bathing: Maximum assistance  Upper Body Dressing: Minimum assistance  Lower Body Dressing: Maximum assistance  Toileting: Minimum assistance     Cognitive Retraining  Safety/Judgement: Awareness of environment; Fall prevention     Therapeutic Activity:  Pt engaged in functional transfer from chair to standing and engaged in standing activity for approx 2 mins with CGA in preparation for grooming tasks at sink. Pt required min A for stand to sit t/f in preparation for toilet transfer; required skilled instruction on pacing for safety. Pain:  Pre treatment pain level: 0/10  Post treatment pain level: 0/10  Pain Scale 1: Numeric (0 - 10)  Pain Intensity 1: 0      \Activity Tolerance:  Fair  Please refer to the flowsheet for vital signs taken during this treatment. After treatment:   [X] Patient left in no apparent distress sitting up in chair  [ ] Patient left in no apparent distress in bed  [X] Call bell left within reach  [X] Nursing notified  [ ] Caregiver present  [ ] Bed alarm activated      COMMUNICATION/EDUCATION: Patient educated on role of OT, POC, and home safety. He verbalized understanding.   [X] Home safety education was provided and the patient/caregiver indicated understanding. [X] Patient/family have participated as able in goal setting and plan of care. [X] Patient/family agree to work toward stated goals and plan of care. [ ] Patient understands intent and goals of therapy, but is neutral about his/her participation. [ ] Patient is unable to participate in goal setting and plan of care.      Thank you for this referral.     Gadiel Gallagher MS OTR/L  Time Calculation: 23 mins

## 2017-05-23 NOTE — ROUTINE PROCESS
Bedside and Verbal shift change report rec'd  by Maia King RN (offgoing nurse). Report included the following information SBAR, Kardex, Intake/Output, MAR, Tele AV Paced, POC and Recent Results. 0830 Assisted pt to chair for breakfast.  1030 Woundcare to bilateral feet. Pt tolerated well. 1400 Assisted pt into a regular bed ( [facility] not back with new bed)  1730 [facility] arrived with new specialty bed. Pt refused bed. 1915 Bedside and Verbal shift change report given to  Maia King RN. Report included the  following information SBAR, Kardex, Intake/Output, MAR, Tele AV Paced, POC and Recent Results.

## 2017-05-23 NOTE — PROGRESS NOTES
[facility] Dosing Services     Pharmacy dosing ABX empirically for treatment cellulitis of legs     Day of Therapy: 5    Labs:   Level: 26.5 mg/L  (~10.3 hrs post dose)   Extrapolated trough : 22.7 mg/L  Bun/Serum Creatinine -  8 mg/dl / 0.84 mg/dl (on 5/22)  CrCl >100 ml/min  WBC - 11.7  Max temp: - 98.2    Cultures:   NGTD    Plan:   Vancomycin:   Goal trough 15-20. Decrease to 1.25gm IV q12h. Pharmacy to follow and will make changes to dose and/or frequency based on clinical status. Thanks for the consult.

## 2017-05-23 NOTE — PROGRESS NOTES
Problem: Mobility Impaired (Adult and Pediatric)  Goal: *Acute Goals and Plan of Care (Insert Text)  Physical Therapy Goals  Initiated 5/19/2017 and to be accomplished within 7 day(s)  1. Patient will move from supine to sit and sit to supine in bed with supervision/set-up. 2. Patient will transfer from bed to chair and chair to bed with supervision/set-up using the least restrictive device. 3. Patient will perform sit to stand with supervision/set-up. 4. Patient will ambulate with supervision/set-up for 25 feet with the least restrictive device. 5. Patient will ascend/descend 3 stairs with handrail(s) with minimal assistance/contact guard assist.   Outcome: Progressing Towards Goal  PHYSICAL THERAPY TREATMENT     Patient: Neelam Hamlin (44 y.o. male)  Date: 5/23/2017  Diagnosis: Cellulitis of both lower extremities  Cellulitis of abdominal wall <principal problem not specified>       Precautions:  fall  Chart, physical therapy assessment, plan of care and goals were reviewed. ASSESSMENT:  Has meet /exceeded goal #4 for PT POC. Obtained B post op shoes to maximize protection , safety during ambulation as walking out of slipper socks. Ambulating 60 ft total; 20 ft x 2, 10 ft x 2 with RW and SBA to CGA; seated rest breaks. EDUCATION:  Sit to stand transfer prep; encouraged weight reduction  Progression toward goals:        Improving appropriately and progressing toward goals       PLAN:  Patient continues to benefit from skilled intervention to address the above impairments. Continue treatment per established plan of care. Discharge Recommendations:  snf  Further Equipment Recommendations for Discharge:  has walker       SUBJECTIVE:   Patient stated im going to do everything I can to get better. Michi Reese      OBJECTIVE DATA SUMMARY:   Critical Behavior:  Neurologic State: Alert  Orientation Level: Oriented X4  Cognition: Appropriate for age attention/concentration, Appropriate safety awareness, Follows commands        G CODE:na  Functional Mobility Training:  Bed Mobility:  Supine to Sit: Minimum assistance; Additional time;Assist x2  Transfers:  Sit to Stand: Minimum assistance; Additional time;Assist x1  Stand to Sit: Stand-by asssistance  Bed to Chair: Stand-by asssistance;Contact guard assistance  Interventions: Safety awareness training;Verbal cues  Balance:  Sitting: Intact  Standing: With support  Standing - Static: Good  Standing - Dynamic : Fair (plus)  Ambulation/Gait Training:  Distance (ft): 60 Feet (ft) (20 ft x 2 & 10 ft x 2)  Assistive Device: Walker, rolling  Ambulation - Level of Assistance: Stand-by asssistance;Contact guard assistance  Gait Abnormalities: Antalgic;Decreased step clearance  Base of Support: Center of gravity altered     Speed/Juanita: Slow  Step Length: Left shortened;Right shortened  Interventions: Safety awareness training;Verbal cues  Therapeutic Exercises:   B LAQ 2 x 10   w rest between sets of 10  Vital Signs  Temp: 97.9 °F (36.6 °C)     Pulse (Heart Rate): 81     BP: 107/58     Resp Rate: 24     O2 Sat (%): 96 %  Pain:  Pre treatment pain level:  0  Post treatment pain level: 0  Pain Scale 1: Numeric (0 - 10)  Pain Intensity 1: 0     Activity Tolerance:   poor   After treatment:   Patient left in no apparent distress sitting up in chair  Call bell left within reach  Nursing notified      Johnney Osgood, PTA   Time Calculation: 40 mins

## 2017-05-23 NOTE — ROUTINE PROCESS
1923: Assumed care. Behavior appropriate to situations. Assessment done. Denies any pain or discomfort at this time. Call light within reach. 2153: Due meds given. 2159: No change from previous assessment. 0030: Sleeping.  0315: No change from previous assessment. Specialty bed malfunctioned. Company called. Will send technician. 6323: Due med given. 8108: No change from previous assessment. Slept on & off thru night. Needs attended. 6243: Due meds given. 0730: Bedside and Verbal shift change report given to Ion Marina RN (oncoming nurse) by Marcela Veliz RN RN (offgoing nurse). Report included the following information SBAR, Kardex, Intake/Output, MAR and Recent Results.

## 2017-05-24 ENCOUNTER — HOSPITAL ENCOUNTER (INPATIENT)
Age: 62
LOS: 16 days | Discharge: HOME HEALTH | End: 2017-06-09
Attending: INTERNAL MEDICINE | Admitting: INTERNAL MEDICINE

## 2017-05-24 VITALS
BODY MASS INDEX: 39.48 KG/M2 | SYSTOLIC BLOOD PRESSURE: 113 MMHG | TEMPERATURE: 98.5 F | RESPIRATION RATE: 18 BRPM | DIASTOLIC BLOOD PRESSURE: 76 MMHG | WEIGHT: 282 LBS | HEART RATE: 75 BPM | OXYGEN SATURATION: 95 % | HEIGHT: 71 IN

## 2017-05-24 LAB
BACTERIA SPEC CULT: NORMAL
BACTERIA SPEC CULT: NORMAL
INR PPP: 1.8 (ref 0.8–1.2)
INR PPP: 1.8 (ref 0.8–1.2)
PROTHROMBIN TIME: 20.5 SEC (ref 11.5–15.2)
PROTHROMBIN TIME: 20.5 SEC (ref 11.5–15.2)
SERVICE CMNT-IMP: NORMAL
SERVICE CMNT-IMP: NORMAL

## 2017-05-24 PROCEDURE — 74011250637 HC RX REV CODE- 250/637: Performed by: HOSPITALIST

## 2017-05-24 PROCEDURE — 74011250636 HC RX REV CODE- 250/636: Performed by: INTERNAL MEDICINE

## 2017-05-24 PROCEDURE — 74011250637 HC RX REV CODE- 250/637: Performed by: INTERNAL MEDICINE

## 2017-05-24 PROCEDURE — 85610 PROTHROMBIN TIME: CPT | Performed by: HOSPITALIST

## 2017-05-24 PROCEDURE — 74011250636 HC RX REV CODE- 250/636: Performed by: HOSPITALIST

## 2017-05-24 PROCEDURE — 74011000258 HC RX REV CODE- 258: Performed by: INTERNAL MEDICINE

## 2017-05-24 PROCEDURE — 36415 COLL VENOUS BLD VENIPUNCTURE: CPT | Performed by: HOSPITALIST

## 2017-05-24 RX ORDER — OXYCODONE AND ACETAMINOPHEN 5; 325 MG/1; MG/1
1-2 TABLET ORAL
Qty: 20 TAB | Refills: 0 | Status: SHIPPED | OUTPATIENT
Start: 2017-05-24

## 2017-05-24 RX ORDER — FACIAL-BODY WIPES
10 EACH TOPICAL DAILY PRN
Status: DISCONTINUED | OUTPATIENT
Start: 2017-05-24 | End: 2017-06-09 | Stop reason: HOSPADM

## 2017-05-24 RX ORDER — FUROSEMIDE 20 MG/1
20 TABLET ORAL 2 TIMES DAILY
Status: DISCONTINUED | OUTPATIENT
Start: 2017-05-24 | End: 2017-05-31

## 2017-05-24 RX ORDER — CARVEDILOL 12.5 MG/1
12.5 TABLET ORAL 2 TIMES DAILY WITH MEALS
Status: DISCONTINUED | OUTPATIENT
Start: 2017-05-24 | End: 2017-06-09 | Stop reason: HOSPADM

## 2017-05-24 RX ORDER — TRAMADOL HYDROCHLORIDE 50 MG/1
50 TABLET ORAL
Status: DISCONTINUED | OUTPATIENT
Start: 2017-05-24 | End: 2017-06-09 | Stop reason: HOSPADM

## 2017-05-24 RX ORDER — ACETAMINOPHEN 325 MG/1
650 TABLET ORAL
Status: DISCONTINUED | OUTPATIENT
Start: 2017-05-24 | End: 2017-06-09 | Stop reason: HOSPADM

## 2017-05-24 RX ORDER — SULFAMETHOXAZOLE AND TRIMETHOPRIM 800; 160 MG/1; MG/1
1 TABLET ORAL 2 TIMES DAILY
Status: COMPLETED | OUTPATIENT
Start: 2017-05-24 | End: 2017-05-31

## 2017-05-24 RX ORDER — SULFAMETHOXAZOLE AND TRIMETHOPRIM 800; 160 MG/1; MG/1
1 TABLET ORAL 2 TIMES DAILY
Qty: 14 TAB | Refills: 0 | Status: SHIPPED
Start: 2017-05-24 | End: 2018-08-14

## 2017-05-24 RX ORDER — AMMONIUM LACTATE 12 G/100G
LOTION TOPICAL 2 TIMES DAILY
Status: DISCONTINUED | OUTPATIENT
Start: 2017-05-25 | End: 2017-06-09 | Stop reason: HOSPADM

## 2017-05-24 RX ORDER — WARFARIN 4 MG/1
8 TABLET ORAL EVERY OTHER DAY
Qty: 30 TAB | Refills: 0 | Status: SHIPPED
Start: 2017-05-24 | End: 2017-06-09

## 2017-05-24 RX ORDER — ADHESIVE BANDAGE
30 BANDAGE TOPICAL DAILY PRN
Status: DISCONTINUED | OUTPATIENT
Start: 2017-05-24 | End: 2017-06-09 | Stop reason: HOSPADM

## 2017-05-24 RX ORDER — NITROGLYCERIN 0.4 MG/1
0.4 TABLET SUBLINGUAL
Status: DISCONTINUED | OUTPATIENT
Start: 2017-05-24 | End: 2017-06-09 | Stop reason: HOSPADM

## 2017-05-24 RX ORDER — WARFARIN 2 MG/1
4 TABLET ORAL EVERY EVENING
Status: DISCONTINUED | OUTPATIENT
Start: 2017-05-24 | End: 2017-05-30

## 2017-05-24 RX ADMIN — SODIUM CHLORIDE 1250 MG: 900 INJECTION, SOLUTION INTRAVENOUS at 12:00

## 2017-05-24 RX ADMIN — CARVEDILOL 12.5 MG: 12.5 TABLET, FILM COATED ORAL at 17:32

## 2017-05-24 RX ADMIN — Medication 5 ML: at 14:00

## 2017-05-24 RX ADMIN — ZINC OXIDE: 200 OINTMENT TOPICAL at 09:00

## 2017-05-24 RX ADMIN — PIPERACILLIN SODIUM,TAZOBACTAM SODIUM 3.38 G: 3; .375 INJECTION, POWDER, FOR SOLUTION INTRAVENOUS at 06:06

## 2017-05-24 RX ADMIN — FUROSEMIDE 40 MG: 40 TABLET ORAL at 08:28

## 2017-05-24 RX ADMIN — WARFARIN SODIUM 4 MG: 2 TABLET ORAL at 17:32

## 2017-05-24 RX ADMIN — Medication 10 ML: at 06:06

## 2017-05-24 RX ADMIN — SULFAMETHOXAZOLE AND TRIMETHOPRIM 1 TABLET: 800; 160 TABLET ORAL at 17:32

## 2017-05-24 RX ADMIN — PIPERACILLIN SODIUM,TAZOBACTAM SODIUM 3.38 G: 3; .375 INJECTION, POWDER, FOR SOLUTION INTRAVENOUS at 14:00

## 2017-05-24 RX ADMIN — FUROSEMIDE 20 MG: 20 TABLET ORAL at 17:32

## 2017-05-24 RX ADMIN — CARVEDILOL 12.5 MG: 12.5 TABLET, FILM COATED ORAL at 08:28

## 2017-05-24 NOTE — PROGRESS NOTES
conducted a Follow-up consultation and Spiritual Assessment for Rosanna Clinton, who is a 64 y.o.,male. Patients Primary Language is: [place]. According to the patients EMR [employer] Affiliation is: [employer]. The reason the Patient came to the hospital is:   Patient Active Problem List    Diagnosis Date Noted    Cellulitis of abdominal wall 05/18/2017    Cellulitis of both lower extremities 05/18/2017        The  provided the following Interventions:  Continued the relationship of care and support. Listened empathically to patient's concerns. He said that he used to live by himself, but because of his condition he will be moving in with his daughter. In that way, he will have the needed support. Offered [employer], prayer and assurance of continued prayer on patient's behalf. Chart reviewed. The following outcomes were achieved:  Patient expressed gratitude for pastoral care visit. Assessment:  There are no further spiritual or Jew issues which require Spiritual Care Services interventions at this time. Plan:  Chaplains will continue to follow and will provide pastoral care as needed or requested. The Rev.  40  Duyen Botello [address]  [facility] [phone] / [facility] [phone]

## 2017-05-24 NOTE — DISCHARGE INSTRUCTIONS
DISCHARGE SUMMARY from Nurse    The following personal items are in your possession at time of discharge:    Dental Appliances: None  Visual Aid: None     Home Medications: Sent to pharmacy  Jewelry: None  Clothing: Pants, Jacket/Coat, Shirt, Socks  Other Valuables: Wallet (2 wallets)  Personal Items Sent to Safe: 2 wallets, keys          PATIENT INSTRUCTIONS:    After general anesthesia or intravenous sedation, for 24 hours or while taking prescription Narcotics:  · Limit your activities  · Do not drive and operate hazardous machinery  · Do not make important personal or business decisions  · Do  not drink alcoholic beverages  · If you have not urinated within 8 hours after discharge, please contact your surgeon on call. Report the following to your surgeon:  · Excessive pain, swelling, redness or odor of or around the surgical area  · Temperature over 100.5  · Nausea and vomiting lasting longer than 4 hours or if unable to take medications  · Any signs of decreased circulation or nerve impairment to extremity: change in color, persistent  numbness, tingling, coldness or increase pain  · Any questions        What to do at Home:  Recommended activity: Activity as tolerated,     If you experience any of the following symptoms uncontrollable pain, fever, please follow up with PCP      *  Please give a list of your current medications to your Primary Care Provider. *  Please update this list whenever your medications are discontinued, doses are      changed, or new medications (including over-the-counter products) are added. *  Please carry medication information at all times in case of emergency situations. These are general instructions for a healthy lifestyle:    No smoking/ No tobacco products/ Avoid exposure to second hand smoke    Surgeon General's Warning:  Quitting smoking now greatly reduces serious risk to your health.     Obesity, smoking, and sedentary lifestyle greatly increases your risk for illness    A healthy diet, regular physical exercise & weight monitoring are important for maintaining a healthy lifestyle    You may be retaining fluid if you have a history of heart failure or if you experience any of the following symptoms:  Weight gain of 3 pounds or more overnight or 5 pounds in a week, increased swelling in our hands or feet or shortness of breath while lying flat in bed. Please call your doctor as soon as you notice any of these symptoms; do not wait until your next office visit. Recognize signs and symptoms of STROKE:    F-face looks uneven    A-arms unable to move or move unevenly    S-speech slurred or non-existent    T-time-call 911 as soon as signs and symptoms begin-DO NOT go       Back to bed or wait to see if you get better-TIME IS BRAIN. Warning Signs of HEART ATTACK     Call 911 if you have these symptoms:   Chest discomfort. Most heart attacks involve discomfort in the center of the chest that lasts more than a few minutes, or that goes away and comes back. It can feel like uncomfortable pressure, squeezing, fullness, or pain.  Discomfort in other areas of the upper body. Symptoms can include pain or discomfort in one or both arms, the back, neck, jaw, or stomach.  Shortness of breath with or without chest discomfort.  Other signs may include breaking out in a cold sweat, nausea, or lightheadedness. Don't wait more than five minutes to call 911 - MINUTES MATTER! Fast action can save your life. Calling 911 is almost always the fastest way to get lifesaving treatment. Emergency Medical Services staff can begin treatment when they arrive -- up to an hour sooner than if someone gets to the hospital by car. The discharge information has been reviewed with the patient. The patient       Atrial Fibrillation: Care Instructions  Your Care Instructions    Atrial fibrillation is an irregular and often fast heartbeat.  Treating this condition is important for several reasons. It can cause blood clots, which can travel from your heart to your brain and cause a stroke. If you have a fast heartbeat, you may feel lightheaded, dizzy, and weak. An irregular heartbeat can also increase your risk for heart failure. Atrial fibrillation is often the result of another heart condition, such as high blood pressure or coronary artery disease. Making changes to improve your heart condition will help you stay healthy and active. Follow-up care is a key part of your treatment and safety. Be sure to make and go to all appointments, and call your doctor if you are having problems. It's also a good idea to know your test results and keep a list of the medicines you take. How can you care for yourself at home? Medicines  · Take your medicines exactly as prescribed. Call your doctor if you think you are having a problem with your medicine. You will get more details on the specific medicines your doctor prescribes. · If your doctor has given you a blood thinner to prevent a stroke, be sure you get instructions about how to take your medicine safely. Blood thinners can cause serious bleeding problems. · Do not take any vitamins, over-the-counter drugs, or herbal products without talking to your doctor first.  Lifestyle changes  · Do not smoke. Smoking can increase your chance of a stroke and heart attack. If you need help quitting, talk to your doctor about stop-smoking programs and medicines. These can increase your chances of quitting for good. · Eat a heart-healthy diet. · Stay at a healthy weight. Lose weight if you need to. · Limit alcohol to 2 drinks a day for men and 1 drink a day for women. Too much alcohol can cause health problems. · Avoid colds and flu. Get a pneumococcal vaccine shot. If you have had one before, ask your doctor whether you need another dose. Get a flu shot every year. If you must be around people with colds or flu, wash your hands often.   Activity  · If your doctor recommends it, get more exercise. Walking is a good choice. Bit by bit, increase the amount you walk every day. Try for at least 30 minutes on most days of the week. You also may want to swim, bike, or do other activities. Your doctor may suggest that you join a cardiac rehabilitation program so that you can have help increasing your physical activity safely. · Start light exercise if your doctor says it is okay. Even a small amount will help you get stronger, have more energy, and manage stress. Walking is an easy way to get exercise. Start out by walking a little more than you did in the hospital. Gradually increase the amount you walk. · When you exercise, watch for signs that your heart is working too hard. You are pushing too hard if you cannot talk while you are exercising. If you become short of breath or dizzy or have chest pain, sit down and rest immediately. · Check your pulse regularly. Place two fingers on the artery at the palm side of your wrist, in line with your thumb. If your heartbeat seems uneven or fast, talk to your doctor. When should you call for help? Call 911 anytime you think you may need emergency care. For example, call if:  · You have symptoms of a heart attack. These may include:  ¨ Chest pain or pressure, or a strange feeling in the chest.  ¨ Sweating. ¨ Shortness of breath. ¨ Nausea or vomiting. ¨ Pain, pressure, or a strange feeling in the back, neck, jaw, or upper belly or in one or both shoulders or arms. ¨ Lightheadedness or sudden weakness. ¨ A fast or irregular heartbeat. After you call 911, the  may tell you to chew 1 adult-strength or 2 to 4 low-dose aspirin. Wait for an ambulance. Do not try to drive yourself. · You have symptoms of a stroke. These may include:  ¨ Sudden numbness, tingling, weakness, or loss of movement in your face, arm, or leg, especially on only one side of your body. ¨ Sudden vision changes.   ¨ Sudden trouble speaking. ¨ Sudden confusion or trouble understanding simple statements. ¨ Sudden problems with walking or balance. ¨ A sudden, severe headache that is different from past headaches. · You passed out (lost consciousness). Call your doctor now or seek immediate medical care if:  · You have new or increased shortness of breath. · You feel dizzy or lightheaded, or you feel like you may faint. · Your heart rate becomes irregular. · You can feel your heart flutter in your chest or skip heartbeats. Tell your doctor if these symptoms are new or worse. Watch closely for changes in your health, and be sure to contact your doctor if you have any problems. Where can you learn more? Go to http://melyssa-sudha.info/. Enter U020 in the search box to learn more about \"Atrial Fibrillation: Care Instructions. \"  Current as of: January 27, 2016  Content Version: 11.2  © 4997-8533 [employer]. Care instructions adapted under license by [employer] (which disclaims liability or warranty for this information). If you have questions about a medical condition or this instruction, always ask your healthcare professional. [place] [address] any warranty or liability for your use of this information. verbalized understanding. Discharge medications reviewed with the patient and appropriate educational materials and side effects teaching were provided.

## 2017-05-24 NOTE — IP AVS SNAPSHOT
Current Discharge Medication List  
  
START taking these medications Dose & Instructions Dispensing Information Comments Morning Noon Evening Bedtime  
 ammonium lactate 12 % lotion Commonly known as:  LAC-HYDRIN Your last dose was: Your next dose is:    
   
   
 rub in to affected area well Quantity:  1 Bottle Refills:  0  
     
   
   
   
  
 traMADol 50 mg tablet Commonly known as:  ULTRAM  
   
Your last dose was: Your next dose is:    
   
   
 Dose:  50 mg Take 1 Tab by mouth every six (6) hours as needed. Max Daily Amount: 200 mg. Quantity:  50 Tab Refills:  0 CONTINUE these medications which have CHANGED Dose & Instructions Dispensing Information Comments Morning Noon Evening Bedtime  
 furosemide 20 mg tablet Commonly known as:  LASIX What changed:   
- how much to take 
- how to take this - when to take this 
- additional instructions Your last dose was: Your next dose is: One pill a day Quantity:  60 Tab Refills:  0  
     
   
   
   
  
 warfarin 2 mg tablet Commonly known as:  COUMADIN What changed:   
- medication strength 
- how much to take - when to take this Your last dose was: Your next dose is:    
   
   
 Dose:  6 mg Take 3 Tabs by mouth every evening. Quantity:  100 Tab Refills:  0 CONTINUE these medications which have NOT CHANGED Dose & Instructions Dispensing Information Comments Morning Noon Evening Bedtime  
 carvedilol 12.5 mg tablet Commonly known as:  Erna Martin Your last dose was: Your next dose is:    
   
   
 Dose:  12.5 mg Take 1 Tab by mouth two (2) times daily (with meals). Quantity:  60 Tab Refills:  0  
     
   
   
   
  
 nitroglycerin 0.4 mg SL tablet Commonly known as:  NITROSTAT Your last dose was: Your next dose is: Dose:  0.4 mg  
1 Tab by SubLINGual route every five (5) minutes as needed for Chest Pain. Indications: ANGINA Quantity:  60 Tab Refills:  0 ASK your doctor about these medications Dose & Instructions Dispensing Information Comments Morning Noon Evening Bedtime Menthol-Zinc Oxide 0.44-20.6 % Oint Commonly known as:  Calmoseptine Your last dose was: Your next dose is:    
   
   
 Dose:  1 Each Apply 1 Each to affected area daily. Indications: SKIN IRRITATION Refills:  0  
     
   
   
   
  
 oxyCODONE-acetaminophen 5-325 mg per tablet Commonly known as:  PERCOCET Your last dose was: Your next dose is:    
   
   
 Dose:  1-2 Tab Take 1-2 Tabs by mouth every four (4) hours as needed for Pain. Max Daily Amount: 12 Tabs. Indications: Pain Quantity:  20 Tab Refills:  0  
     
   
   
   
  
 trimethoprim-sulfamethoxazole 160-800 mg per tablet Commonly known as:  BACTRIM DS Your last dose was: Your next dose is:    
   
   
 Dose:  1 Tab Take 1 Tab by mouth two (2) times a day. Quantity:  14 Tab Refills:  0 Where to Get Your Medications Information on where to get these meds will be given to you by the nurse or doctor. ! Ask your nurse or doctor about these medications  
  ammonium lactate 12 % lotion  
 carvedilol 12.5 mg tablet  
 furosemide 20 mg tablet  
 nitroglycerin 0.4 mg SL tablet  
 traMADol 50 mg tablet  
 warfarin 2 mg tablet

## 2017-05-24 NOTE — DISCHARGE SUMMARY
Discharge Summary    Patient: America Land               Sex: male          DOA: 5/17/2017         YOB: 1955      Age:  64 y.o.        LOS:  LOS: 6 days                Admit Date: 5/17/2017    Discharge Date: 5/24/2017    Discharge Medications:     Current Discharge Medication List      START taking these medications    Details   trimethoprim-sulfamethoxazole (BACTRIM DS) 160-800 mg per tablet Take 1 Tab by mouth two (2) times a day. Qty: 14 Tab, Refills: 0         CONTINUE these medications which have CHANGED    Details   oxyCODONE-acetaminophen (PERCOCET) 5-325 mg per tablet Take 1-2 Tabs by mouth every four (4) hours as needed for Pain. Max Daily Amount: 12 Tabs. Indications: Pain  Qty: 20 Tab, Refills: 0      warfarin (COUMADIN) 4 mg tablet Take 2 Tabs by mouth every other day. Qty: 30 Tab, Refills: 0         CONTINUE these medications which have NOT CHANGED    Details   furosemide (LASIX) 20 mg tablet Take 20 mg by mouth two (2) times a day. nitroglycerin (NITROSTAT) 0.4 mg SL tablet 0.4 mg by SubLINGual route every five (5) minutes as needed for Chest Pain. Indications: ANGINA      carvedilol (COREG) 12.5 mg tablet Take 12.5 mg by mouth two (2) times daily (with meals). Menthol-Zinc Oxide (CALMOSEPTINE) 0.44-20.6 % oint Apply 1 Each to affected area daily. Indications: SKIN IRRITATION         STOP taking these medications       lisinopril (PRINIVIL, ZESTRIL) 5 mg tablet Comments:   Reason for Stopping:         ciprofloxacin HCl (CIPRO) 500 mg tablet Comments:   Reason for Stopping:                Follow-up: pcp/in facility physician in 1 week    Discharge Condition: Stable    Activity: PT/OT Eval and Treat    Diet: Resume previous diet    Labs:  Labs: Results:       Chemistry Recent Labs      05/23/17   1459  05/22/17   0450   GLU  77  73*   NA  138  138   K  3.9  4.3   CL  102  102   CO2  28  28   BUN  8  8   CREA  1.01  0.84   CA  8.4*  8.3*   AGAP  8  8   BUCR  8*  10* CBC w/Diff Recent Labs      05/22/17   0450   WBC  11.7   RBC  3.58*   HGB  11.2*   HCT  33.6*   PLT  496*   GRANS  61   LYMPH  18*   EOS  12*      Cardiac Enzymes No results for input(s): CPK, CKND1, TIAGO in the last 72 hours. No lab exists for component: CKRMB, TROIP   Coagulation Recent Labs      05/24/17   0622  05/23/17   0437   PTP  20.5*  20.0*   INR  1.8*  1.8*       Lipid Panel No results found for: CHOL, CHOLPOCT, CHOLX, CHLST, CHOLV, [id number], HDL, LDL, NLDLCT, DLDL, LDLC, DLDLP, [id number], VLDLC, VLDL, TGL, TGLX, TRIGL, [id number], TRIGP, TGLPOCT, [id number], CHHD, CHHDX   BNP No results for input(s): BNPP in the last 72 hours. Liver Enzymes No results for input(s): TP, ALB, TBIL, AP, SGOT, GPT in the last 72 hours.     No lab exists for component: DBIL   Thyroid Studies No results found for: T4, T3U, TSH, TSHEXT         Consults: None    Treatment Team: Treatment Team: Attending Provider: Karyna Pinto MD; Consulting Provider: Carol Perla MD; Care Manager: Bisi Cutler RN; Occupational Therapy Assistant: PATTI Hannah; Physical Therapy Assistant: Mike Martins PTA    Significant Diagnostic Studies: labs:   Recent Results (from the past 24 hour(s))   VANCOMYCIN, TROUGH    Collection Time: 05/23/17  2:59 PM   Result Value Ref Range    Vancomycin,trough 26.5 (HH) 10.0 - 20.0 ug/mL    Reported dose date: 20170523      Reported dose time: 400      Reported dose: 5.7QC UNITS   METABOLIC PANEL, BASIC    Collection Time: 05/23/17  2:59 PM   Result Value Ref Range    Sodium 138 136 - 145 mmol/L    Potassium 3.9 3.5 - 5.5 mmol/L    Chloride 102 100 - 108 mmol/L    CO2 28 21 - 32 mmol/L    Anion gap 8 3.0 - 18 mmol/L    Glucose 77 74 - 99 mg/dL    BUN 8 7.0 - 18 MG/DL    Creatinine 1.01 0.6 - 1.3 MG/DL    BUN/Creatinine ratio 8 (L) 12 - 20      GFR est AA >60 >60 ml/min/1.73m2    GFR est non-AA >60 >60 ml/min/1.73m2    Calcium 8.4 (L) 8.5 - 10.1 MG/DL   PROTHROMBIN TIME + INR    Collection Time: 05/24/17  6:22 AM   Result Value Ref Range    Prothrombin time 20.5 (H) 11.5 - 15.2 sec    INR 1.8 (H) 0.8 - 1.2           Discharge diagnoses:    Problem List as of 5/24/2017  Never Reviewed          Codes Class Noted - Resolved    Cellulitis of abdominal wall ICD-10-CM: L03.311  ICD-9-CM: 682.2  5/18/2017 - Present        Cellulitis of both lower extremities ICD-10-CM: L03.115, L03.116  ICD-9-CM: 682.6  5/18/2017 - Present            Hospital Course:   Major issues addressed during hospitalization outlined  below. The patient is a 19-year-old male  with a history of coronary artery disease, CHF, hypertension, reports  that for the past day he has had 3 falls. He states that his falls are  followed by lightheadedness. Every time he falls he injures a different  part of his body, including his head, his back, etc. After the third fall  today he called 911 for help. He also reports that his blood pressure  checked at home has been consistently low. He does not recall the  number but says they are all under 687 systolic. Bilaterally he states  that he has tenderness in both his legs. He denies any headache or  blurry vision. He denies any chest pain, abdominal pain, nausea,  vomiting, diarrhea. While in the ED, the patient was found to have a  cellulitic appearance of both lower extremities, also in lower portion of  his abdomen beneath his pannus and some skin breakdown in the  buttocks. BNP was greater than 2800. Sodium was low and his INR is  supratherapeutic at 3.9. The patient is on Coumadin for chronic atrial  fibrillation. IV antibiotics were initiated. The patient will be admitted for  further management. Patient was admitted with b/l LE cellulitis and ulceration most of which was likely chronic. He did not have fevers or high wbc to suggest a bad active infection , he was treated with vanc and zosyn then discharged with 7 additional days of bactrim.   PT followed and patient was recommended to go to snf. He was accepted on 5/24 to [facility]. Wound care followed. Coumadin was continued and increased to 8 mg q48 hrs. PCP or infacility physician f/u in 1 week.      Griselda Shepherd, MD  May 24, 2017        Total time spent 40 minutes

## 2017-05-24 NOTE — ROUTINE PROCESS
1920: Assumed care. Behavior appropriate to situations. Assessment done. Denies any pain or discomfort at this time. Call light & personal needs within reach. 2145: Due meds given. 2217: No change from previous assessment. 2355: Due med given. 0200: Sleeping. 2390: No change from previous assessment.   0606: Due med given. 0645: Slept good thru night. Needs attended. 0710: Bedside and Verbal shift change report given to Christelle Rosa RN (oncoming nurse) by Dilcia Mason RN (offgoing nurse). Report included the following information SBAR, Kardex, Intake/Output, MAR and Recent Results.

## 2017-05-24 NOTE — IP AVS SNAPSHOT
[facility] 
 
 
 [address] [place] [address] Patient: Keila Carl MRN: [id number] :1955 You are allergic to the following No active allergies Recent Documentation Height Weight BMI Smoking Status 1.803 m 142.1 kg 43.68 kg/m2 Never Smoker Emergency Contacts Name Discharge Info Relation Home Work Mobile Luanne Hillman CAREGIVER [3] Child [2]   [phone] About your hospitalization You were admitted on:  May 24, 2017 You last received care in the:  [facility] [address] [place] [address] You were discharged on:  2017 Unit phone number:  [phone] Why you were hospitalized Your primary diagnosis was:  Not on File Providers Seen During Your Hospitalizations Provider Role Specialty Primary office phone Ellen Vasquez MD Attending Provider Geriatric Medicine [phone] Your Primary Care Physician (PCP) Primary Care Physician Office Phone Office Fax UNKNOWN, PROVIDER ** None ** ** None ** Follow-up Information Follow up With Details Comments Contact Info Provider Unknown   Patient not available to ask Jayne Marquez MD  Follow up on Thursday 06/15/17 @ 10:15AM [address] [address] 
[phone] Primitivo Chan NP  Follow up on 17 @ 9:00 am  Phone [phone] [place]  Staci Deshpande [id number] [phone] Current Discharge Medication List  
  
START taking these medications Dose & Instructions Dispensing Information Comments Morning Noon Evening Bedtime  
 ammonium lactate 12 % lotion Commonly known as:  LAC-HYDRIN Your last dose was: Your next dose is:    
   
   
 rub in to affected area well Quantity:  1 Bottle Refills:  0  
     
   
   
   
  
 traMADol 50 mg tablet Commonly known as:  ULTRAM  
   
Your last dose was: Your next dose is:    
   
   
 Dose:  50 mg Take 1 Tab by mouth every six (6) hours as needed. Max Daily Amount: 200 mg. Quantity:  50 Tab Refills:  0 CONTINUE these medications which have CHANGED Dose & Instructions Dispensing Information Comments Morning Noon Evening Bedtime  
 furosemide 20 mg tablet Commonly known as:  LASIX What changed:   
- how much to take 
- how to take this - when to take this 
- additional instructions Your last dose was: Your next dose is: One pill a day Quantity:  60 Tab Refills:  0  
     
   
   
   
  
 warfarin 2 mg tablet Commonly known as:  COUMADIN What changed:   
- medication strength 
- how much to take - when to take this Your last dose was: Your next dose is:    
   
   
 Dose:  6 mg Take 3 Tabs by mouth every evening. Quantity:  100 Tab Refills:  0 CONTINUE these medications which have NOT CHANGED Dose & Instructions Dispensing Information Comments Morning Noon Evening Bedtime  
 carvedilol 12.5 mg tablet Commonly known as:  Kitty Roque Your last dose was: Your next dose is:    
   
   
 Dose:  12.5 mg Take 1 Tab by mouth two (2) times daily (with meals). Quantity:  60 Tab Refills:  0  
     
   
   
   
  
 nitroglycerin 0.4 mg SL tablet Commonly known as:  NITROSTAT Your last dose was: Your next dose is:    
   
   
 Dose:  0.4 mg  
1 Tab by SubLINGual route every five (5) minutes as needed for Chest Pain. Indications: ANGINA Quantity:  60 Tab Refills:  0 ASK your doctor about these medications Dose & Instructions Dispensing Information Comments Morning Noon Evening Bedtime Menthol-Zinc Oxide 0.44-20.6 % Oint Commonly known as:  Calmoseptine Your last dose was: Your next dose is:    
   
   
 Dose:  1 Each Apply 1 Each to affected area daily. Indications: SKIN IRRITATION Refills:  0  
     
   
   
   
  
 oxyCODONE-acetaminophen 5-325 mg per tablet Commonly known as:  PERCOCET Your last dose was: Your next dose is:    
   
   
 Dose:  1-2 Tab Take 1-2 Tabs by mouth every four (4) hours as needed for Pain. Max Daily Amount: 12 Tabs. Indications: Pain Quantity:  20 Tab Refills:  0  
     
   
   
   
  
 trimethoprim-sulfamethoxazole 160-800 mg per tablet Commonly known as:  BACTRIM DS Your last dose was: Your next dose is:    
   
   
 Dose:  1 Tab Take 1 Tab by mouth two (2) times a day. Quantity:  14 Tab Refills:  0 Where to Get Your Medications Information on where to get these meds will be given to you by the nurse or doctor. ! Ask your nurse or doctor about these medications  
  ammonium lactate 12 % lotion  
 carvedilol 12.5 mg tablet  
 furosemide 20 mg tablet  
 nitroglycerin 0.4 mg SL tablet  
 traMADol 50 mg tablet  
 warfarin 2 mg tablet Discharge Instructions DISCHARGE SUMMARY from Nurse The following personal items are in your possession at time of discharge: 
 
Dental Appliances: None Visual Aid: None Jewelry: None Clothing: Footwear, Pants, Shirt, Socks Other Valuables: Cell Phone, Donnalee Sesar PATIENT INSTRUCTIONS: 
 
 
F-face looks uneven A-arms unable to move or move unevenly S-speech slurred or non-existent T-time-call 911 as soon as signs and symptoms begin-DO NOT go Back to bed or wait to see if you get better-TIME IS BRAIN.  
 
Warning Signs of HEART ATTACK  
 
 Call 911 if you have these symptoms: 
? Chest discomfort. Most heart attacks involve discomfort in the center of the chest that lasts more than a few minutes, or that goes away and comes back. It can feel like uncomfortable pressure, squeezing, fullness, or pain. ? Discomfort in other areas of the upper body. Symptoms can include pain or discomfort in one or both arms, the back, neck, jaw, or stomach. ? Shortness of breath with or without chest discomfort. ? Other signs may include breaking out in a cold sweat, nausea, or lightheadedness. Don't wait more than five minutes to call [address]! Fast action can save your life. Calling 911 is almost always the fastest way to get lifesaving treatment. Emergency Medical Services staff can begin treatment when they arrive  up to an hour sooner than if someone gets to the hospital by car. The discharge information has been reviewed with the {PATIENT). The {PATIENT) verbalized understanding. Discharge medications reviewed with the {Dishcarge meds viewed with patient and appropriate educational materials and side effects teaching were provided. Discharge Orders None [employer] Announcement We are excited to announce that we are making your provider's discharge notes available to you in [employer]. You will see these notes when they are completed and signed by the physician that discharged you from your recent hospital stay. If you have any questions or concerns about any information you see in [employer], please call the Health Information Department where you were seen or reach out to your Primary Care Provider for more information about your plan of care. Introducing [facility]! Ela Toledo introduces [employer] patient portal. Now you can access parts of your medical record, email your doctor's office, and request medication refills online. 1. In your internet browser, go to [email or web address] 2. Click on the First Time User? Click Here link in the Sign In box. You will see the New Member Sign Up page. 3. Enter your [employer] Access Code exactly as it appears below. You will not need to use this code after youve completed the sign-up process. If you do not sign up before the expiration date, you must request a new code. · [employer] Access Code: [email or web address] Expires: 8/22/2017  2:18 PM 
 
4. Enter the last four digits of your Social Security Number (xxxx) and Date of Birth (mm/dd/yyyy) as indicated and click Submit. You will be taken to the next sign-up page. 5. Create a [employer] ID. This will be your [employer] login ID and cannot be changed, so think of one that is secure and easy to remember. 6. Create a [employer] password. You can change your password at any time. 7. Enter your Password Reset Question and Answer. This can be used at a later time if you forget your password. 8. Enter your e-mail address. You will receive e-mail notification when new information is available in [address] 9. Click Sign Up. You can now view and download portions of your medical record. 10. Click the Download Summary menu link to download a portable copy of your medical information. If you have questions, please visit the Frequently Asked Questions section of the [employer] website. Remember, [employer] is NOT to be used for urgent needs. For medical emergencies, dial 911. Now available from your iPhone and Android! General Information Please provide this summary of care documentation to your next provider. Patient Signature:  ____________________________________________________________ Date:  ____________________________________________________________  
  
[facility] Provider Signature:  ____________________________________________________________ Date:  ____________________________________________________________

## 2017-05-25 LAB
FOLATE SERPL-MCNC: 5.6 NG/ML (ref 3.1–17.5)
INR PPP: 1.8 (ref 0.8–1.2)
PROTHROMBIN TIME: 20.1 SEC (ref 11.5–15.2)
VIT B12 SERPL-MCNC: 598 PG/ML (ref 211–911)

## 2017-05-25 PROCEDURE — 85610 PROTHROMBIN TIME: CPT | Performed by: INTERNAL MEDICINE

## 2017-05-25 PROCEDURE — 74011250637 HC RX REV CODE- 250/637: Performed by: INTERNAL MEDICINE

## 2017-05-25 PROCEDURE — 82607 VITAMIN B-12: CPT | Performed by: INTERNAL MEDICINE

## 2017-05-25 PROCEDURE — 36415 COLL VENOUS BLD VENIPUNCTURE: CPT | Performed by: INTERNAL MEDICINE

## 2017-05-25 PROCEDURE — 93306 TTE W/DOPPLER COMPLETE: CPT

## 2017-05-25 RX ORDER — METOLAZONE 2.5 MG/1
5 TABLET ORAL DAILY
Status: DISCONTINUED | OUTPATIENT
Start: 2017-05-26 | End: 2017-05-30

## 2017-05-25 RX ADMIN — FUROSEMIDE 20 MG: 20 TABLET ORAL at 09:36

## 2017-05-25 RX ADMIN — SULFAMETHOXAZOLE AND TRIMETHOPRIM 1 TABLET: 800; 160 TABLET ORAL at 17:13

## 2017-05-25 RX ADMIN — FUROSEMIDE 20 MG: 20 TABLET ORAL at 17:12

## 2017-05-25 RX ADMIN — Medication: at 10:13

## 2017-05-25 RX ADMIN — WARFARIN SODIUM 4 MG: 2 TABLET ORAL at 17:12

## 2017-05-25 RX ADMIN — CARVEDILOL 12.5 MG: 12.5 TABLET, FILM COATED ORAL at 17:12

## 2017-05-25 RX ADMIN — SULFAMETHOXAZOLE AND TRIMETHOPRIM 1 TABLET: 800; 160 TABLET ORAL at 09:36

## 2017-05-25 RX ADMIN — CARVEDILOL 12.5 MG: 12.5 TABLET, FILM COATED ORAL at 09:36

## 2017-05-25 RX ADMIN — Medication: at 18:00

## 2017-05-25 NOTE — ROUTINE PROCESS
Bedside shift change report given to Kenya Kaufman LPN (oncoming nurse) by Sanya Gilbert RN (offgoing nurse). Report included the following information SBAR, Kardex, MAR and Recent Results. VISUALLY CHECKED PT Q 1 HR BY NURSING STAFF. 24 hour order chart check done

## 2017-05-25 NOTE — PROGRESS NOTES
Problem: Mobility Impaired (Adult and Pediatric)  Goal: *Acute Goals and Plan of Care (Insert Text)  PHYSICAL THERAPY STG GOALS :  Initiated 5/25/2017 and to be accomplished within 2 Weeks    1. Patient will move from supine to sit and sit to supine and roll side to side in bed with contact guard assist.   2. Patient will transfer from bed to chair and chair to bed with stand by assist using RW. 3. Patient will perform sit to stand with stand by assist with Fair+ balance and safety awareness. 4. Patient will ambulate with stand by assist for 75 feet with RW on level surfaces with 2 turns. 5. Patient will ascend/descend 4 stairs with right handrail(s) withminimal assistance/contact guard assist to allow for safe home access/exit. 6. Patient will improve standardized test score for [place] Standing Balance scale 3+ for reduced fall risk. PHYSICAL THERAPY LTG GOALS :  Initiated 5/25/2017 and to be accomplished within 4 Weeks    1. Patient will move from supine to sit and sit to supine and roll side to side in bed with modified independence. 2. Patient will transfer from bed to chair and chair to bed with modified independence using RW. 3. Patient will perform sit to stand with modified independence with Good balance and safety awareness. 4. Patient will ambulate with supervision/set-up for 150 feet with RW on level surfaces and be able to maneuver through narrow spaces and obstacles without loss of balance. 5. Patient will ascend/descend 4 stairs with right handrail(s) with stand by assist to allow for safe home access/exit. 6. Patient will improve standardized test score for [place] Standing Balance scale 4 for reduced fall risk. Physical Therapist: Nirmala Escudero PT on 5/25/2017   [facility] CARE CENTER   PHYSICAL THERAPY EVALUATION     Patient: Ciara Harrison (67 y.o. male)                Start of Care Date: 5/25/2017   Referred by :  Demetrius Bartholomew MD Precautions: Fall           History:  Patient was admitted to [facility] on 5/17/17 with a Dx of BLE CELLULITIS. Upon acute d/c, he was unsafe to return home and was transferred to [facility]. History of present problem reveals HIGH Complexity :3+ comorbidities / personal factors will impact the outcome/ POC . Past Medical history includes:   Past Medical History:   Diagnosis Date    CAD (coronary artery disease)      Heart failure ([address]      Hypertension       Past Surgical History:   Procedure Laterality Date    HX PACEMAKER            Cognitive Status:  Mental Status  Neurologic State: Alert; Appropriate for age  Orientation Level: Oriented X4  Cognition: Appropriate for age attention/concentration  Perception: Appears intact  Perseveration: No perseveration noted  Safety/Judgement: Fall prevention  Barriers to Learning/Limitations: None  Compensate with: visual, verbal, tactile, kinesthetic cues/model  Prior Level of Function/Home Situation and Assitance recieved:  Home Situation  Home Environment: Apartment (dtr's apt)  # Steps to Enter: 4  Rails to Enter: Yes  Hand Rails : Right  Wheelchair Ramp: No  One/Two Story Residence: One story  Living Alone: No  Support Systems: Child(ed), Family member(s)  Patient Expects to be Discharged to[de-identified] Apartment (dtr's apt)  Current DME Used/Available at Home: Walker, rolling  Tub or Shower Type: Tub/Shower combination  Level of Assistance received at Home:   Prior to this current hospitalization, the patient reportedly required use of RW for mod (I) in ADLs. He lived home alone; however, he plans to d/c to daughter's apartment. Justification for Evaluation complexity:  Patient is referred to Skilled Physical Therapy services due a functional decline in strength, endurance, mobility, gait, and balance and required an overall HIGH  complexity evaluation examination.    Exam:HIGH Complexity : 4+ Standardized tests and measures addressing body structure, function, activity limitation and / or participation in recreation    Clinical Presentation: MEDIUM Complexity : Evolving with changing characteristics       OBJECTIVE DATA SUMMARY:      Vital Signs:  Resting BP:  BP: 123/63  HR: Pulse (Heart Rate): 89    Pain:     Gross Assessment:  Gross Assessment  AROM: Generally decreased, functional  PROM: Generally decreased, functional  Strength: Generally decreased, functional (BLEs grossly 4+/5, except B hips at 3/5)  Coordination: Generally decreased, functional  Tone: Normal              Bed Mobility:  Bed Mobility  Rolling:  (not assessed)  Balance:  Balance  Sitting: Intact  Standing: With support  Standing - Static: Fair  Standing - Dynamic : Fair  Transfers:  Sit to Stand: Contact guard assistance  Gait:  Gait  Base of Support: Center of gravity altered  Speed/Juanita: Pace decreased (<100 feet/min)  Step Length: Left shortened;Right shortened  Gait Abnormalities: Decreased step clearance  Distance (ft): 20 Feet (ft)  Assistive Device: Walker, rolling  Gait Description (WDL): Exceptions to WDL        With 2 turns. Wheelchair Management:   . Assessment:   Clinical Decision Making: Of Medium Complexity , using standardized patient assessment instrument and /or measurable assessement of functional outcome of [facility] Standing Balance Scale, score of 2.  0: Pt performs 25% or less of standing activity (Max assist) CN, 100% impaired. 1: Pt supports self with upper extremities but requires therapist assistance. Pt performs 25-50% of effort (Mod assist) CM, 80% to <100% impaired. 1+: Pt supports self with upper extremities but requires therapist assistance. Pt performs >50% effort. (Min assist). CL, 60% to <80% impaired. 2: Pt supports self independently with both upper extremities (walker, crutches, parallel bars). CL, 60% to <80% impaired. 2+: Pt support self independently with 1 upper extremity (cane, crutch, 1 parallel bar). CK, 40% to <60% impaired.   3: Pt stands without upper extremity support for up to 30 seconds. CK, 40% to <60% impaired. 3+: Pt stands without upper extremity support for 30 seconds or greater. CJ, 20% to <40% impaired. 4: Pt independently moves and returns center of gravity 1-2 inches in one plane. CJ, 20% to <40% impaired. 4+: Pt independently moves and returns center of gravity 1-2 inches in multiple planes. CI, 1% to <20% impaired. 5: Pt independently moves and returns center of gravity in all planes greater than 2 inches. CH, 0% impaired. Saida Gonzalez Positioning needs/Additional Comments :  Pt presents in chair finishing breakfast. Sit <> stand with CGA. Gait training x 20 ft using RW with CGA, initially demonstrating step to gait pattern with discontinuous steps, but improved with cueing to continuous steps. Therapeutic exercises rendered to address strength, endurance, and mobility and included: heel raises, toe raises 15 reps x 2 sets; LAQ 15 reps with visual and verbal cueing for proper technique. Pt demonstrates poor endurance and requires frequent rest breaks throughout session. He appears a bit apprehensive about participation in PT services, reporting, \"You know I have congestive heart failure, right? \" as well as \"My doctor says I can't lift over 20 lbs or do flights of stairs. \" Therapist educated pt on participation and role of physical therapy services to promote safe d/c to daughter's home. Safety awareness techniques rendered to reduce fall risk. Recommend skilled physical therapy services to address deficits, restore function, and for safe return home.        TREATMENT PLAN: Rehab Potential : Good  Skilled Physical Therapy Services may include:   [X]           Bed Mobility Training             [X]    Neuromuscular Re-Education  [X]           Transfer Training                   [X]    Orthotic/Prosthetic Training  [X]           Gait Training                          [X]    Modalities  [X]           Therapeutic Procedures        [X]    Manual Therapy  [X]           Therapeutic Activities            [X]    Patient and Family Training/Education  [ ]           Other (comment):      Frequency/Duration: Patient will be followed by physical therapy for 1-2 times a day for a minimum of 3 days per week for 4 weeks to address goals. Discharge Recommendations: Home Health  Patient's expected Discharge Location:  [X]Private Residence  [ ] correction/ILF  [ Emogene Ped  [ ]Other:       COMMUNICATION/EDUCATION:  Patient/Family Agreement with Treatment Plan :      [X]         The PT evaluation/POC has been reviewed with PT assistant. [X]         Fall prevention education was provided and the patient/caregiver indicated understanding. [X]         Patient/family have participated as able in goal setting and plan of care and Patient/family agree to work toward stated goals and plan of care. [ ]         Patient is unable to participate in goal setting and plan of care. Therapist/SW will contact family/POA. Treatment session:  Overall Complexity: MEDIUM Evaluation:  32 mins./ Treatment:  36 mins.   Physical Therapist:   Dorian Ervin, PT       5/25/2017   Thank you for this referral.

## 2017-05-25 NOTE — PROGRESS NOTES
Nutrition initial assessment-[facility]/  Plan of care      RECOMMENDATIONS:     1. Cardiac diet  2. Monitor weight and PO intake  3. RD to follow     GOALS:     1. PO intake meets >75% of protein/calorie needs by 6/1  2. Weight Maintenance/Gradual weight loss (1-2 lb by 6/1)    ASSESSMENT:     Weight status is classified as obese per BMI of 43.8. PO intake is adequate. Labs noted. Nutrition recommendations listed. RD to follow. Nutrition Diagnoses:   Obesity related to excessive energy intake as evidenced by BMI of 43.8. Nutrition Risk:  [] High  [] Moderate [x]  Low    SUBJECTIVE/OBJECTIVE:      Transferred from [address] [place] [address] to [facility] on 5/24/17. Admitted with cellulitis of abdominal wall and both lower extremities. Patient appears well nourished. Patient with a good appetite and eating 100% of meals prior to transfer. No known food allergies. Will monitor.     Information Obtained from:    [x] Chart Review   [x] Patient   [] Family/Caregiver   [] Nurse/Physician   [] Interdisciplinary Meeting/Rounds    Diet: Cardiac diet  Medications: [x] Reviewed    Allergies: [x] Reviewed   Patient Active Problem List   Diagnosis Code    Cellulitis of abdominal wall L03.311    Cellulitis of both lower extremities L03.115, L03.116     Past Medical History:   Diagnosis Date    CAD (coronary artery disease)     Heart failure ([facility] [address]     Hypertension       Labs:    Lab Results   Component Value Date/Time    Sodium 138 05/23/2017 02:59 PM    Potassium 3.9 05/23/2017 02:59 PM    Chloride 102 05/23/2017 02:59 PM    CO2 28 05/23/2017 02:59 PM    Anion gap 8 05/23/2017 02:59 PM    Glucose 77 05/23/2017 02:59 PM    BUN 8 05/23/2017 02:59 PM    Creatinine 1.01 05/23/2017 02:59 PM    Calcium 8.4 05/23/2017 02:59 PM    Magnesium 1.9 05/17/2017 11:48 PM    Albumin 2.4 05/17/2017 11:48 PM     Anthropometrics: BMI (calculated): 43.8  Last 3 Recorded Weights in this Encounter    05/24/17 1632   Weight: 142.3 kg (313 lb 12.8 oz)      Ht Readings from Last 1 Encounters:   05/24/17 5' 11\" (1.803 m)     No data found. IBW: 172 lb %IBW: 182%    Estimated Nutrition Needs: [x] MSJ  [] Other:  Calories: 0716-0288 Kcal Based on:   [x] Actual BW    Protein:   114 g Based on:   [x] Actual BW    Fluid:       3300 ml Based on:   [x] Actual BW      [x] No Cultural, Jehovah's witness or ethnic dietary need identified.     [] Cultural, Jehovah's witness and ethnic food preferences identified and addressed     Wt Status:  [] Normal (18.6 - 24.9) [] Underweight (<18.5) [] Overweight (25 - 29.9) [] Mild Obesity (30 - 34.9)  [] Moderate Obesity (35 - 39.9) [x] Morbid Obesity (40+)     Nutrition Problems Identified:   [] Suboptimal PO intake   [] Food Allergies  [] Difficulty chewing/swallowing/poor dentition  [] Constipation/Diarrhea   [] Nausea/Vomiting   [] None  [x] Other: Excessive energy intake    Plan:   [x] Therapeutic Diet  []  Obtained/adjusted food preferences/tolerances and/or snacks options   []  Supplements added   [] Occupational therapy following for feeding techniques  []  HS snack added   []  Modify diet texture   []  Modify diet for food allergies   []  Assist with menu selection   [x]  Monitor PO intake on meal rounds   [x]  Continue inpatient monitoring and intervention   [x]  Participate in discharge planning/Interdisciplinary rounds/Team meetings   []  Other:     Education Needs:   [] Not appropriate for teaching at this time due to:   [x] Identified and addressed    Nutrition Monitoring and Evaluation:  [x] Continue ongoing monitoring and intervention  [] Tony Matias

## 2017-05-25 NOTE — H&P
[address]  ROUTINE H AND PS    Name:  Kemal Musa  MR#:  [id number]  :  1955  Account #:  [de-identified]  Date of Adm:  2017      He is a 49-year-old gentleman with a history of coronary artery  disease, congestive heart failure, hypertension and recently was  admitted for light headedness. He has a previous history of a  pacemaker and in fact in the hospital he was found to have systolics  less than 931 on admission which have subsequently improved. He  was on lisinopril, that was discontinued and he improved. He has  additional problems of venous stasis in both lower extremities. He is on  intermittent or chronic atrial fibrillation and is on Coumadin for this  problem. His EKG on admission did demonstrate atrial fibrillation. He was transferred on medications that include:  1. Lac-Hydrin to both lower extremities twice a day. 2. Coreg 12.5 mg 2 times a day. 3. Lasix 20 mg 2 times a day. 4. Bactrim after being on oral and IV antibiotics twice a day for 7 days. 5. Coumadin, he was on 8 mg every other day, but with the Bactrim will  switch him to 4 mg every evening and monitor his protime closely until  stable. 6. Nitroglycerin or Nitrostat p.r.n.  7. Ultram for pain. SOCIAL HISTORY: Reveals the patient currently lives by himself, but  is moving in with his daughter. REVIEW OF SYSTEMS:  CONSTITUTIONAL: He denies weight loss or weight gain. He does admit however to peripheral edema. He denies nausea,  vomiting. He denies problems with bowel function. He denies difficulty  urinating and he does state that he does get short of breath with  minimal exertion. PHYSICAL EXAMINATION  GENERAL: Revealed a well developed male. HEENT: Head was normocephalic. Eyes were equal and reactive to  light. Extraocular movements intact. Disks were flat, fundi were benign. Tympanic membranes were intact. NECK: Supple. CHEST: Clear, but distant.   CARDIAC: Also distant, regular rate and rhythm. I heard no murmurs. ABDOMEN: Soft, bowel sounds were normally active. EXTREMITIES: Deep tendon reflexes were equal.    With findings compatible with chronic venous stasis on his left leg  greater than his right leg and 2+ flank edema was noted as well. IMPRESSION  1. Congestive heart failure. 2. Chronic venous stasis. 3. Probable ischemic cardiomyopathy with history of coronary artery  disease. 4. Possible lower extremity cellulitis versus chronic inflammation from  the edema. 5. Orthostatic hypotension with falls. Will monitor his blood pressure  both lying and standing. Lisinopril has been discontinued, but he is still  on Coreg which we will continue until we find that his blood pressure  can tolerate it.         Guillermo Osman MD    [facility] / Berta Alcantara  D:  05/25/2017   14:50  T:  05/25/2017   15:19  Job #:  [id number]

## 2017-05-25 NOTE — ROUTINE PROCESS
1600: Admitted patient to unit to the care of Dr. Kenroy Asencio. Alert, verbal, and oriented x4. Ambulated with walker to bathroom. Tolerated well. Noted bilateral redness and swelling to lower extremities. Bilateral lower extremities with leathery appearance. Noted to open areas to left lower extremity. Area #1 (top) measures 1.5x0.5x0; area with whitish center. Area #2 (lower) measures 1x0. 5x0 with white center. Noted small amount of weeping from left lower extremity. Feet dry and peeling. Noted thick circular skin to bottom of right foot. Noted redness and peeling to left abdominal fold and gluteal crease and fold.

## 2017-05-25 NOTE — ROUTINE PROCESS
Bedside and Verbal shift change report given to CHIQUITA Ortez RN (oncoming nurse) by CHARLIE Murphy RN (offgoing nurse). Report included the following information SBAR, Kardex and MAR.

## 2017-05-25 NOTE — PROGRESS NOTES
conducted an initial consultation and Spiritual Assessment for Salena Pedraza, who is a 64 y.o.,male. Patients Primary Language is: [place]. According to the patients EMR Faith Affiliation is: Roman Catholic. The reason the Patient came to the hospital is:   Patient Active Problem List    Diagnosis Date Noted    Cellulitis of abdominal wall 05/18/2017    Cellulitis of both lower extremities 05/18/2017        The  provided the following Interventions:  Initiated a relationship of care and support. Explored issues of janay, belief, spirituality and Anabaptism/ritual needs while hospitalized. Listened empathically. Provided chaplaincy education. Provided information about Spiritual Care Services. Offered prayer and assurance of continued prayers on patient's behalf. Chart reviewed. The following outcomes were achieved:  Patient shared limited information about both their medical narrative and spiritual journey/beliefs. Patient processed feeling about current hospitalization. Patient expressed gratitude for the 's visit. Assessment:  Patient does not have any Anabaptism/cultural needs that will affect patients preferences in health care. Patient did not indicate any spiritual or Anabaptism issues which require Spiritual Care Services interventions at this time. Plan:  Chaplains will continue to follow and will provide pastoral care on an as needed/requested basis.  recommends bedside caregivers page  on duty if patient shows signs of acute spiritual or emotional distress.     [facility]   Staff [facility]   [phone]

## 2017-05-26 LAB
FOLATE SERPL-MCNC: 5.2 NG/ML (ref 3.1–17.5)
INR PPP: 1.9 (ref 0.8–1.2)
PROTHROMBIN TIME: 20.7 SEC (ref 11.5–15.2)
VIT B12 SERPL-MCNC: 562 PG/ML (ref 211–911)

## 2017-05-26 PROCEDURE — 74011250637 HC RX REV CODE- 250/637: Performed by: INTERNAL MEDICINE

## 2017-05-26 PROCEDURE — 85610 PROTHROMBIN TIME: CPT | Performed by: INTERNAL MEDICINE

## 2017-05-26 PROCEDURE — 36415 COLL VENOUS BLD VENIPUNCTURE: CPT | Performed by: INTERNAL MEDICINE

## 2017-05-26 PROCEDURE — 82607 VITAMIN B-12: CPT | Performed by: INTERNAL MEDICINE

## 2017-05-26 RX ADMIN — FUROSEMIDE 20 MG: 20 TABLET ORAL at 17:47

## 2017-05-26 RX ADMIN — CARVEDILOL 12.5 MG: 12.5 TABLET, FILM COATED ORAL at 09:25

## 2017-05-26 RX ADMIN — CARVEDILOL 12.5 MG: 12.5 TABLET, FILM COATED ORAL at 17:47

## 2017-05-26 RX ADMIN — SULFAMETHOXAZOLE AND TRIMETHOPRIM 1 TABLET: 800; 160 TABLET ORAL at 17:47

## 2017-05-26 RX ADMIN — SULFAMETHOXAZOLE AND TRIMETHOPRIM 1 TABLET: 800; 160 TABLET ORAL at 09:26

## 2017-05-26 RX ADMIN — Medication: at 13:22

## 2017-05-26 RX ADMIN — FUROSEMIDE 20 MG: 20 TABLET ORAL at 09:25

## 2017-05-26 RX ADMIN — WARFARIN SODIUM 4 MG: 2 TABLET ORAL at 17:47

## 2017-05-26 RX ADMIN — METOLAZONE 5 MG: 2.5 TABLET ORAL at 09:25

## 2017-05-26 NOTE — PROGRESS NOTES
LYN met with pt to complete the social evaluation. Pt reported that he was living alone prior to his admission to the hospital but will move in with his daughter Gerardo Pate at d/c. He used a walker for mobility and was able to manage his adl's. He was receiving home health via [facility] for wound care. LYN informed pt of the rehab process. Pt was informed that his insurance [employer] approved him for 7 days and that SW will send an update on 5/30/17 to request additional time if needed. LYN also informed pt that Sw will arrange a family conference if needed to discuss any d/c concerns. LYN informed pt that his [employer] is a Medicare replacement. Pt seemed confused that he gave up his regular Medicare benefits when he enrolled in [facility] [employer]. He did stated that he has better coverage for his medications and other benefits. LYN agreed to follow-up with pt on Tues re: extension request that will sent to [facility] [employer].

## 2017-05-26 NOTE — ROUTINE PROCESS
Bedside and verbal shift report given to GLENDA Babcock (oncoming nurse) by CLINTON Paredes (off going nurse). Report included SBAR, MAR and Kardex. Hourly rounds completed.

## 2017-05-26 NOTE — PROGRESS NOTES
Problem: Mobility Impaired (Adult and Pediatric)  Goal: *Acute Goals and Plan of Care (Insert Text)  PHYSICAL THERAPY STG GOALS :  Initiated 5/25/2017 and to be accomplished within 2 Weeks    1. Patient will move from supine to sit and sit to supine and roll side to side in bed with contact guard assist.   2. Patient will transfer from bed to chair and chair to bed with stand by assist using RW. 3. Patient will perform sit to stand with stand by assist with Fair+ balance and safety awareness. 4. Patient will ambulate with stand by assist for 75 feet with RW on level surfaces with 2 turns. 5. Patient will ascend/descend 4 stairs with right handrail(s) withminimal assistance/contact guard assist to allow for safe home access/exit. 6. Patient will improve standardized test score for [place] Standing Balance scale 3+ for reduced fall risk. PHYSICAL THERAPY LTG GOALS :  Initiated 5/25/2017 and to be accomplished within 4 Weeks    1. Patient will move from supine to sit and sit to supine and roll side to side in bed with modified independence. 2. Patient will transfer from bed to chair and chair to bed with modified independence using RW. 3. Patient will perform sit to stand with modified independence with Good balance and safety awareness. 4. Patient will ambulate with supervision/set-up for 150 feet with RW on level surfaces and be able to maneuver through narrow spaces and obstacles without loss of balance. 5. Patient will ascend/descend 4 stairs with right handrail(s) with stand by assist to allow for safe home access/exit. 6. Patient will improve standardized test score for [place] Standing Balance scale 4 for reduced fall risk.      Physical Therapist: Neela Fletcher PT on 5/25/2017    [facility]   PHYSICAL THERAPY DAILY TREATMENT NOTE        Patient: Yariel Carvajal (44 y.o. male)               Date: 5/26/2017    Physician: Valerie Laboy MD  Primary Diagnosis: CELLULITIS Treatment Diagnosis  Treatment Diagnosis: muscle weakness  Treatment Diagnosis 2: difficulty in walking  Precautions: Fall  Vital Signs  Vital Signs  Temp: 97.7 °F (36.5 °C)  Temp Source: Oral  Pulse (Heart Rate): 66  Resp Rate: 20  O2 Sat (%): 95 %  Level of Consciousness: Alert  BP: 113/56  MAP (Calculated): 75  MEWS Score: 1     Cognitive Status:     Pain     Bed Mobility Training  Bed Mobility Training  Rolling: Stand-by asssistance  Supine to Sit: Stand-by asssistance  Balance  Sitting: Intact  Standing: With support  Standing - Static: Fair  Standing - Dynamic : Fair  Transfer Training  Transfer Training  Sit to Stand: Contact guard assistance  Stand to Sit: Contact guard assistance  Sit to Stand: Contact guard assistance  Gait Training  Gait  Base of Support: Center of gravity altered  Speed/Juanita: Slow  Step Length: Left shortened;Right shortened  Gait Abnormalities: Antalgic  Ambulation - Level of Assistance: Contact guard assistance  Distance (ft): 14 Feet (ft)  Assistive Device: Gait belt;Walker, rolling  Interventions: Safety awareness training     Gait Abnormalities: Antalgic            With 1 turns. Therapeutic Exercise:    Bed mobility and transfers as mentioned above. Gait training as mentioned above. Dynamic standing balance x 7 min 33 sec with single UE support with no swaying noted. TE rendered to address strength, endurance, and mobility and included: heel raises, toe raises, LAQ 20 reps x 2 sets. Frequent rest breaks required due to impaired balance. Patient/Caregiver Education:   Pt /Caregiver Education on safety and fall prevention to reduce fall risk. ASSESSMENT:  Patient continues to benefit from Skilled PT services to improve strength, endurance, mobility,gait, and balance.    Progression toward goals:  [ ]      Improving appropriately and progressing toward goals  [X]      Improving slowly and progressing toward goals  [ ]      Not making progress toward goals and plan of care will be adjusted      Treatment session: 70 minutes.   Therapist:   Paola Smith PT,          5/26/2017

## 2017-05-26 NOTE — PROGRESS NOTES
Problem: Self Care Deficits Care Plan (Adult)  Goal: *Acute Goals and Plan of Care (Insert Text)  OCCUPATIONAL THERAPY SHORT TERM GOALS   Initiated 5/26/2017 and to be accomplished within 2 Week(s)    1. Patient will perform Upper body ADLs with/without adaptive equipment with supervision/set-up. 2. Patient will perform Lower body ADLs with/without adaptive equipment with minimal assistance/contact guard assist .  3. Patient will perform toileting task with minimal assistance/contact guard assist with Fair safety to reduce falls risk. 4. Patient will perform functional transfers with Herminia Goodpasture and minimal assistance/contact guard assist.  5. Patient will perform standing static/dynamic balance activities for improved ADL/IADL function with minimal assistance/contact guard assist and Fair balance and safety awarenes. 6. Patient will improve Barthel index scores to atleast 55/100 to improve functional mobility. OCCUPATIONAL THERAPY LONG TERM GOALS   Initiated 5/26/2017 and to be accomplished within 3-4 Week(s)    1. Patient will perform Upper body ADLs with/without adaptive equipment with supervision/set-up. 2. Patient will perform Lower body ADLs with/without adaptive equipment with supervision/set-up . 3. Patient will perform toileting task with supervision/set-up with Good safety to reduce falls risk. 4. Patient will perform functional transfers with Rolling Walker and supervision/set-up and Good balance and safety awareness. 5. Patient will perform standing static/dynamic activity for improved ADL/IADL function with supervision/set-up an and Good balance and safety awareness. 6. Patient will improve Barthel index score to 75/100 to improve independence with mobility.     Therapist: MILTON Umanzor 5/26/2017   [facility]   OCCUPATIONAL THERAPY EVALUATION        Patient: Demetrios Jeans (52 y.o. male)            Start of Care Date: 5/26/2017                         Onset Date: 5/26/2017  Referred by : Brian Hernandes MD                        Primary Diagnosis: CELLULITIS                Treatment Diagnosis  Treatment Diagnosis: muscle weakness  Treatment Diagnosis 2: difficulty in walking     Precautions: Fall  Eval Complexity: History: MEDIUM Complexity : Expanded review of history including physical, cognitive and psychosocial  history . History of present problem reveals MEDIUM  Complexity : 1-2 comorbidities / personal factors will impact the outcome/ POC . Past Medical history includes:   Past Medical History:   Diagnosis Date    CAD (coronary artery disease)      Heart failure ([address]      Hypertension       Past Surgical History:   Procedure Laterality Date    HX PACEMAKER          Cognitive Status:  Mental Status  Neurologic State: Alert  Orientation Level: Oriented X4  Cognition: Appropriate for age attention/concentration; Follows commands  Perception: Appears intact  Perseveration: No perseveration noted  Safety/Judgement: Fall prevention  Barriers to Learning/Limitations: None  Compensate with: visual, verbal, tactile, kinesthetic cues/model  Justification for Evaluation complexity:   MEDIUM Complexity : Patient may present with comorbidities that affect occupational performnce. Miniml to moderate modification of tasks or assistance (eg, physical or verbal ) with assesment(s) is necessary to enable patient to complete evaluation . Patient is referred to Skilled Occupational Therapy Services due to a functional decline in strength, endurance and balance for carryover of ADLs and transfers with safety.    Prior Level of Function/Home Situation:   Home Situation  Home Environment: Apartment (pt plans to return to his daughter's house upon d/c.)  # Steps to Enter: 4  Rails to Enter: Yes  Hand Rails : Bilateral  Wheelchair Ramp: No  One/Two Story Residence: One story  Living Alone: Yes  Support Systems: Child(ed), Family member(s)  Patient Expects to be Discharged to[de-identified] Apartment  Current DME Used/Available at Home: Cane, straight, Walker, rolling  Tub or Shower Type: Tub/Shower combination  Level of Assistance received at Home: Prior to this current hospitalization, the patient required no assistance for BADLs and transfers. Pt lives in a private residence and reported that he plans to move into his daughter's apartment upon d/c. OBJECTIVE DATA SUMMARY:      Vital Signs:  Resting BP:  BP: 113/56  HR: Pulse (Heart Rate): 66     Pain:  Auditory:  Auditory  Auditory Impairment: None  Examination:   MEDIUM Complexity : 3-5 performance deficits relating to physical, cognitive , or psychosocial skils that result in activity limitations and / or participation restrictions; Tone and Sensation:  Tone: Normal  Sensation: Intact  Visual Perceptual:  Vision  Tracking: Able to track stimulus in all quadrants w/o difficulty    Coordination:  Coordination  Fine Motor Skills-Upper: Left Intact; Right Intact  Gross Motor Skills-Upper: Left Intact; Right Intact  Coordination: Generally decreased, functional  Fine Motor Skills-Upper: Left Intact; Right Intact    Gross Motor Skills-Upper: Left Intact; Right Intact  Bed Mobility:  Bed Mobility  Rolling: Stand-by asssistance  Supine to Sit: Stand-by asssistance  Sit to Supine: Stand-by asssistance  Rolling: Stand-by asssistance  Transfers:  Functional Transfers  Sit to Stand: Contact guard assistance  Stand to Sit: Contact guard assistance  Bed to Chair: Minimum assistance  Toilet Transfer : Minimum assistance (chair to bsc)  Balance:  Balance  Sitting: Intact  Standing: With support  Standing - Static: Fair  Standing - Dynamic : Fair  Gross Assesment:  Gross Assessment  AROM: Generally decreased, functional  PROM: Generally decreased, functional  Strength: Generally decreased, functional  Coordination: Generally decreased, functional  Tone: Normal  Sensation: Intact  ADL self care:  Basic ADL  Feeding: Independent  Oral Facial Hygiene/Grooming: Setup  Bathing: Moderate assistance  Upper Body Dressing: Minimum assistance  Lower Body Dressing: Moderate assistance  Toileting: Moderate assistance  ADL Intervention:  Toileting  Toileting Assistance: Moderate assistance  Bladder Hygiene: Moderate assistance  Clothing Management: Moderate assistance  ASSESSMENT:   A clinical decision making of MEDIUM complexity was conducted, which includes an analysis of the Occupational profile, standardized assessments, data and treatment options. THE BARTHEL INDEX      ACTIVITY    SCORE   FEEDING  0=unable  5=needs help cutting,spreading butter,etc., or modified diet  10= independent    10   BATHING  0=dependent  5=independent (or in shower    0   GROOMING  0=needs help  5=independent face/hair/teeth/shaving (implements provided)    5   DRESSING  0=dependent  5=needs help but can do about half unaided  10=independent(including buttons, zips,laces etc.)    5   BOWELS  0=incontinent  5=occasional accident  10=continent    5   BLADDER  0=incontinent, or catheterized and unable to manage alone  5=occasional accident  10=continent    5   TOILET USE  0=dependent  5=needs some help, but can do something alone  10=independent (on and off, dressing, wiping)    5   TRANSFER (BED TO CHAIR AND BACK)  0=unable, no sitting balance  5=major help(one or two people,physical), can sit  10=minor help(verbal or physical)  15=independent    5   MOBILITY (ON LEVEL SURFACES)  0=immobile or <50 yards  5=wheelchair independent,including corners,>50 yards  10=walkes with help of one person (verbal or physical) >50 yards  15=independent(but may use any aid; for example, stick) >50 yards    0   STAIRS  0=unable  5=needs help (verbal, physical, carrying aid)  10=independent    0              TOTAL:             40/100      Additional comments:  HEP rendered for UE strengthening while pt is not with therapy. Pt demo understanding.        TREATMENT PLAN : Rehab Potential : Good  Skilled Occupational Therapy Services may include:   [X] Self Care/Home Mgmt                    [ ]Cognitive Perceptual Re-training                              [X] Adaptive Equip Training                 [X]Therapeutic Exercise                  [ ]Sensory Integration                           [ ]Community Work Integration  [X]Therapeutic Activities                     [ ]Other/modalities  [X]Neuromuscular Re-education         [ ] Wheelchair Mgmt/propulsion    [X]Patient/Family/Staff Instruction      :  [ ]Orthotics/Prosthetic Fitting & training      Frequency/Duration: Patient will be followed by Occupational therapy for 1-2 times a day for a minimum of 3 days per week for 4 weeks to address goals. .  Discharge Recommendations: Home Health  Patient expected Discharge Location: [X]Private Residence  [ ] [facility] [address]       COMMUNICATION/EDUCATION:  Patient/Family Agreement with Treatment Plan :      [X]       OT Evaluation/POC has been reviewed with the [facility]. [X]        Fall prevention education was provided and the patient/caregiver indicated understanding  [X]        Patient/family have participated as able in goal setting and plan of care. Patient/family agree to work toward stated goals and plan of care. [ ]        Patient is unable to participate in goal setting and plan of care. Therapist will contact LYN/POA. Treatment session:   Overall Complexity:MEDIUM Evaluation:  10mins. Treatment :   50 mins.      Occupational Therapist:   Arin Pradhan [address].          5/26/2017   Thank You for this referral.

## 2017-05-26 NOTE — ROUTINE PROCESS
Bedside and Verbal shift change report given to 29 Cathleen Cummings lpn (oncoming nurse) by reinaldo Garcia lpn (offgoing nurse). Report included the following information SBAR, Kardex and MAR.  Hourly rounds

## 2017-05-27 PROCEDURE — 74011250637 HC RX REV CODE- 250/637: Performed by: INTERNAL MEDICINE

## 2017-05-27 PROCEDURE — 77030011256 HC DRSG MEPILEX <16IN NO BORD MOLN -A

## 2017-05-27 RX ADMIN — SULFAMETHOXAZOLE AND TRIMETHOPRIM 1 TABLET: 800; 160 TABLET ORAL at 17:25

## 2017-05-27 RX ADMIN — SULFAMETHOXAZOLE AND TRIMETHOPRIM 1 TABLET: 800; 160 TABLET ORAL at 08:47

## 2017-05-27 RX ADMIN — WARFARIN SODIUM 4 MG: 2 TABLET ORAL at 17:25

## 2017-05-27 RX ADMIN — Medication: at 17:27

## 2017-05-27 RX ADMIN — CARVEDILOL 12.5 MG: 12.5 TABLET, FILM COATED ORAL at 08:48

## 2017-05-27 RX ADMIN — METOLAZONE 5 MG: 2.5 TABLET ORAL at 08:48

## 2017-05-27 RX ADMIN — Medication: at 08:58

## 2017-05-27 RX ADMIN — FUROSEMIDE 20 MG: 20 TABLET ORAL at 08:47

## 2017-05-27 RX ADMIN — CARVEDILOL 12.5 MG: 12.5 TABLET, FILM COATED ORAL at 17:25

## 2017-05-27 RX ADMIN — FUROSEMIDE 20 MG: 20 TABLET ORAL at 17:25

## 2017-05-27 NOTE — ROUTINE PROCESS
Blister noted on outer left extremity noted to be open when removing tubi . Area was cleaned with derma wound  spray and applied xeroform and a medi plex. Then reapplied a new tubi . Left a message for MD regarding blister opening.

## 2017-05-27 NOTE — ROUTINE PROCESS
Bedside and Verbal shift change report given to CHARLIE ChanRN (oncoming nurse) by LATASHA Taveras LPN (offgoing nurse). Report included the following information SBAR, Kardex and MAR.

## 2017-05-27 NOTE — ROUTINE PROCESS
Bedside and Verbal shift change report given to [address] [place] [address] LPN (oncoming nurse) by Jenkins Klinefelter RN (offgoing nurse). Report included the following information SBAR, Kardex and MAR.

## 2017-05-27 NOTE — ROUTINE PROCESS
Bedside and Verbal shift change report given to julio c pride lpn (oncoming nurse) by reinaldo Garcia lpn (offgoing nurse). Report included the following information SBAR, Kardex and MAR.  Hourly rounds

## 2017-05-28 PROCEDURE — 77030011256 HC DRSG MEPILEX <16IN NO BORD MOLN -A

## 2017-05-28 PROCEDURE — 74011250637 HC RX REV CODE- 250/637: Performed by: INTERNAL MEDICINE

## 2017-05-28 RX ADMIN — CARVEDILOL 12.5 MG: 12.5 TABLET, FILM COATED ORAL at 09:06

## 2017-05-28 RX ADMIN — Medication: at 18:00

## 2017-05-28 RX ADMIN — WARFARIN SODIUM 4 MG: 2 TABLET ORAL at 17:29

## 2017-05-28 RX ADMIN — METOLAZONE 5 MG: 2.5 TABLET ORAL at 09:09

## 2017-05-28 RX ADMIN — FUROSEMIDE 20 MG: 20 TABLET ORAL at 17:29

## 2017-05-28 RX ADMIN — FUROSEMIDE 20 MG: 20 TABLET ORAL at 09:09

## 2017-05-28 RX ADMIN — CARVEDILOL 12.5 MG: 12.5 TABLET, FILM COATED ORAL at 17:29

## 2017-05-28 RX ADMIN — SULFAMETHOXAZOLE AND TRIMETHOPRIM 1 TABLET: 800; 160 TABLET ORAL at 17:29

## 2017-05-28 RX ADMIN — SULFAMETHOXAZOLE AND TRIMETHOPRIM 1 TABLET: 800; 160 TABLET ORAL at 09:06

## 2017-05-28 RX ADMIN — Medication: at 09:00

## 2017-05-28 NOTE — ROUTINE PROCESS
Bedside and Verbal shift change report given to Sophie Knapp LPN (oncoming nurse) by Pennie Gonzales RN (offgoing nurse). Report included the following information SBAR, Kardex and MAR. Hourly rounds made & 24 hour chart check.

## 2017-05-28 NOTE — PROGRESS NOTES
Problem: Mobility Impaired (Adult and Pediatric)  Goal: *Acute Goals and Plan of Care (Insert Text)  PHYSICAL THERAPY STG GOALS :  Initiated 5/25/2017 and to be accomplished within 2 Weeks    1. Patient will move from supine to sit and sit to supine and roll side to side in bed with contact guard assist.   2. Patient will transfer from bed to chair and chair to bed with stand by assist using RW. 3. Patient will perform sit to stand with stand by assist with Fair+ balance and safety awareness. 4. Patient will ambulate with stand by assist for 75 feet with RW on level surfaces with 2 turns. 5. Patient will ascend/descend 4 stairs with right handrail(s) withminimal assistance/contact guard assist to allow for safe home access/exit. 6. Patient will improve standardized test score for [place] Standing Balance scale 3+ for reduced fall risk. PHYSICAL THERAPY LTG GOALS :  Initiated 5/25/2017 and to be accomplished within 4 Weeks    1. Patient will move from supine to sit and sit to supine and roll side to side in bed with modified independence. 2. Patient will transfer from bed to chair and chair to bed with modified independence using RW. 3. Patient will perform sit to stand with modified independence with Good balance and safety awareness. 4. Patient will ambulate with supervision/set-up for 150 feet with RW on level surfaces and be able to maneuver through narrow spaces and obstacles without loss of balance. 5. Patient will ascend/descend 4 stairs with right handrail(s) with stand by assist to allow for safe home access/exit. 6. Patient will improve standardized test score for [place] Standing Balance scale 4 for reduced fall risk.      Physical Therapist: Nirmala Escudero PT on 5/25/2017    Outcome: Progressing Towards Goal  [facility]   PHYSICAL THERAPY DAILY TREATMENT NOTE        Patient: Ciara Harrison (60 y.o. male)               Date: 5/28/2017    Physician: Demetrius Bartholomew MD  Primary Diagnosis: CELLULITIS          Treatment Diagnosis  Treatment Diagnosis: muscle weakness  Treatment Diagnosis 2: difficulty in walking  Precautions: Fall  Vital Signs  Vital Signs  Pulse (Heart Rate): 70  BP: 123/77  Cognitive Status:  Mental Status  Neurologic State: Alert; Appropriate for age  Orientation Level: Oriented X4  Cognition: Appropriate for age attention/concentration  Pain  Bed Mobility Training  Bed Mobility Training  Rolling: Stand-by asssistance  Supine to Sit: Supervision  Balance  Sitting: Intact  Standing: With support  Standing - Static: Fair  Standing - Dynamic : Fair  Transfer Training  Transfer Training  Sit to Stand: Contact guard assistance  Stand to Sit: Contact guard assistance  Bed to Chair: Contact guard assistance  Sit to Stand: Contact guard assistance  Gait Training  Gait  Base of Support: Center of gravity altered  Speed/Juanita: Shuffled; Slow  Step Length: Left shortened;Right shortened  Gait Abnormalities: Decreased step clearance;Shuffling gait  Ambulation - Level of Assistance: Contact guard assistance  Distance (ft): 20 Feet (ft)  Assistive Device: Gait belt;Walker, rolling  Interventions: Safety awareness training;Verbal cues  Gait Abnormalities: Decreased step clearance;Shuffling gait  Therapeutic Exercise:   Pt is progressing well towards goals and was able to ambulate 20ft CGA using FWW with cues for maintaining upright posture and proper sequencing technique. Pt completed seated TE AROM BLE's: ankle pumps, LAQ's, and hip flexion marches 2x15 to increase ROM, strength, and endurance. Patient/Caregiver Education:   Pt /Caregiver Education on benefits of exercises and performing ankle pumps throughout the day for LE edema management. Pt verbalized and demonstrated understanding. ASSESSMENT:  Patient continues to benefit from Skilled PT services to improve strength, endurance, and overall functional mobility with transfers/gait activities. Progression toward goals:  [X]      Improving appropriately and progressing toward goals  [ ]      Improving slowly and progressing toward goals  [ ]      Not making progress toward goals and plan of care will be adjusted      Treatment session: 30 minutes.   Therapist:   Duard Meckel, PTA,          5/28/2017

## 2017-05-29 LAB
ANION GAP BLD CALC-SCNC: 9 MMOL/L (ref 3–18)
BASOPHILS # BLD AUTO: 0.1 K/UL (ref 0–0.06)
BASOPHILS # BLD: 1 % (ref 0–2)
BUN SERPL-MCNC: 14 MG/DL (ref 7–18)
BUN/CREAT SERPL: 13 (ref 12–20)
CALCIUM SERPL-MCNC: 8.7 MG/DL (ref 8.5–10.1)
CHLORIDE SERPL-SCNC: 98 MMOL/L (ref 100–108)
CO2 SERPL-SCNC: 29 MMOL/L (ref 21–32)
CREAT SERPL-MCNC: 1.08 MG/DL (ref 0.6–1.3)
DIFFERENTIAL METHOD BLD: ABNORMAL
EOSINOPHIL # BLD: 1 K/UL (ref 0–0.4)
EOSINOPHIL NFR BLD: 10 % (ref 0–5)
ERYTHROCYTE [DISTWIDTH] IN BLOOD BY AUTOMATED COUNT: 13.6 % (ref 11.6–14.5)
GLUCOSE SERPL-MCNC: 80 MG/DL (ref 74–99)
HCT VFR BLD AUTO: 39 % (ref 36–48)
HGB BLD-MCNC: 13 G/DL (ref 13–16)
INR PPP: 1.6 (ref 0.8–1.2)
LYMPHOCYTES # BLD AUTO: 20 % (ref 21–52)
LYMPHOCYTES # BLD: 2.1 K/UL (ref 0.9–3.6)
MCH RBC QN AUTO: 30.7 PG (ref 24–34)
MCHC RBC AUTO-ENTMCNC: 33.3 G/DL (ref 31–37)
MCV RBC AUTO: 92.2 FL (ref 74–97)
MONOCYTES # BLD: 0.7 K/UL (ref 0.05–1.2)
MONOCYTES NFR BLD AUTO: 7 % (ref 3–10)
NEUTS SEG # BLD: 6.3 K/UL (ref 1.8–8)
NEUTS SEG NFR BLD AUTO: 62 % (ref 40–73)
PLATELET # BLD AUTO: 587 K/UL (ref 135–420)
PMV BLD AUTO: 9.7 FL (ref 9.2–11.8)
POTASSIUM SERPL-SCNC: 4.2 MMOL/L (ref 3.5–5.5)
PROTHROMBIN TIME: 18.6 SEC (ref 11.5–15.2)
RBC # BLD AUTO: 4.23 M/UL (ref 4.7–5.5)
SODIUM SERPL-SCNC: 136 MMOL/L (ref 136–145)
WBC # BLD AUTO: 10.2 K/UL (ref 4.6–13.2)

## 2017-05-29 PROCEDURE — 36415 COLL VENOUS BLD VENIPUNCTURE: CPT | Performed by: INTERNAL MEDICINE

## 2017-05-29 PROCEDURE — 80048 BASIC METABOLIC PNL TOTAL CA: CPT | Performed by: INTERNAL MEDICINE

## 2017-05-29 PROCEDURE — 77030020186 HC BOOT HL PROTCT SAGE -B

## 2017-05-29 PROCEDURE — 85610 PROTHROMBIN TIME: CPT | Performed by: INTERNAL MEDICINE

## 2017-05-29 PROCEDURE — 85025 COMPLETE CBC W/AUTO DIFF WBC: CPT | Performed by: INTERNAL MEDICINE

## 2017-05-29 PROCEDURE — 74011250637 HC RX REV CODE- 250/637: Performed by: INTERNAL MEDICINE

## 2017-05-29 RX ADMIN — METOLAZONE 5 MG: 2.5 TABLET ORAL at 08:38

## 2017-05-29 RX ADMIN — CARVEDILOL 12.5 MG: 12.5 TABLET, FILM COATED ORAL at 08:39

## 2017-05-29 RX ADMIN — Medication: at 18:00

## 2017-05-29 RX ADMIN — SULFAMETHOXAZOLE AND TRIMETHOPRIM 1 TABLET: 800; 160 TABLET ORAL at 08:39

## 2017-05-29 RX ADMIN — SULFAMETHOXAZOLE AND TRIMETHOPRIM 1 TABLET: 800; 160 TABLET ORAL at 17:07

## 2017-05-29 RX ADMIN — Medication: at 08:41

## 2017-05-29 RX ADMIN — FUROSEMIDE 20 MG: 20 TABLET ORAL at 08:39

## 2017-05-29 RX ADMIN — WARFARIN SODIUM 4 MG: 2 TABLET ORAL at 17:07

## 2017-05-29 NOTE — ROUTINE PROCESS
Instructed patient on the importance of having his heels elevated while in bed. [facility] boots applied to bilateral lower extremities and reminded patient to change positions frequently. Patient verbalized understanding.

## 2017-05-29 NOTE — PROGRESS NOTES
Problem: Mobility Impaired (Adult and Pediatric)  Goal: *Acute Goals and Plan of Care (Insert Text)  PHYSICAL THERAPY STG GOALS :  Initiated 5/25/2017 and to be accomplished within 2 Weeks    1. Patient will move from supine to sit and sit to supine and roll side to side in bed with contact guard assist.   2. Patient will transfer from bed to chair and chair to bed with stand by assist using RW. 3. Patient will perform sit to stand with stand by assist with Fair+ balance and safety awareness. 4. Patient will ambulate with stand by assist for 75 feet with RW on level surfaces with 2 turns. 5. Patient will ascend/descend 4 stairs with right handrail(s) withminimal assistance/contact guard assist to allow for safe home access/exit. 6. Patient will improve standardized test score for [place] Standing Balance scale 3+ for reduced fall risk. PHYSICAL THERAPY LTG GOALS :  Initiated 5/25/2017 and to be accomplished within 4 Weeks    1. Patient will move from supine to sit and sit to supine and roll side to side in bed with modified independence. 2. Patient will transfer from bed to chair and chair to bed with modified independence using RW. 3. Patient will perform sit to stand with modified independence with Good balance and safety awareness. 4. Patient will ambulate with supervision/set-up for 150 feet with RW on level surfaces and be able to maneuver through narrow spaces and obstacles without loss of balance. 5. Patient will ascend/descend 4 stairs with right handrail(s) with stand by assist to allow for safe home access/exit. 6. Patient will improve standardized test score for [place] Standing Balance scale 4 for reduced fall risk.      Physical Therapist: Cecilio Shoemaker PT on 5/25/2017    [facility]   PHYSICAL THERAPY DAILY TREATMENT NOTE        Patient: Keila Carl (30 y.o. male)               Date: 5/29/2017    Physician: Ellen Vasquez MD  Primary Diagnosis: CELLULITIS Treatment Diagnosis  Treatment Diagnosis: muscle weakness  Treatment Diagnosis 2: difficulty in walking  Precautions: Fall  Vital Signs  Vital Signs  Pulse (Heart Rate): 75  BP: 100/59     Cognitive Status:     Pain     Bed Mobility Training  Bed Mobility Training  Rolling: Stand-by asssistance  Sit to Supine: Stand-by asssistance  Balance  Sitting: Intact  Standing: With support  Standing - Static: Fair (+)  Standing - Dynamic : Fair  Transfer Training  Transfer Training  Sit to Stand: Stand-by asssistance  Stand to Sit: Stand-by asssistance  Sit to Stand: Stand-by asssistance  Gait Training  Gait  Base of Support: Center of gravity altered; Widened  Speed/Juanita: Slow  Step Length: Left shortened;Right shortened  Gait Abnormalities: Decreased step clearance  Ambulation - Level of Assistance: Stand-by asssistance  Distance (ft): 22 Feet (ft) (x2)  Assistive Device: Gait belt;Walker, rolling  Interventions: Safety awareness training     Gait Abnormalities: Decreased step clearance            With 2 turns. Therapeutic Exercise:    Upon presentation, pt was sitting in chair but was requesting to return to bed. Therapist educated on benefits of participation in PT services, pt responded that he has been told to rest when he's tired and to not be out of bed a lot, reportedly given these orders by his wound care and podiatrist prior to admission to [facility]. Pt agreeable to participate but only if tx was in his room. TE rendered to address circulation, strength, endurance, and mobility and included: heel raises, toe raises, LAQ, heel slides in supine, ankle pumps x 20 reps. Rest breaks required throughout session. Gait training and transfers as mentioned above; verbal cueing for improved proximity to RW for optimal support. Patient/Caregiver Education:   Pt /Caregiver Education on safety and fall prevention to reduce fall risk. Also educated on benefits of participating in skilled physical therapy services. ASSESSMENT:  Patient continues to benefit from Skilled PT services to improve strength, endurance, mobility, gait, and balance. Progression toward goals:  [ ]      Improving appropriately and progressing toward goals  [X]      Improving slowly and progressing toward goals  [ ]      Not making progress toward goals and plan of care will be adjusted      Treatment session: 30 minutes.   Therapist:   Enma Fox, PT,          5/29/2017

## 2017-05-29 NOTE — ROUTINE PROCESS
Bedside and Verbal shift change report given to LATASHA Taveras LPN (oncoming nurse) by Keegan Proctor RN (offgoing nurse). Report included the following information SBAR, Kardex and MAR.

## 2017-05-29 NOTE — PROGRESS NOTES
Problem: Self Care Deficits Care Plan (Adult)  Goal: *Acute Goals and Plan of Care (Insert Text)  OCCUPATIONAL THERAPY SHORT TERM GOALS   Initiated 5/26/2017 and to be accomplished within 2 Week(s)    1. Patient will perform Upper body ADLs with/without adaptive equipment with supervision/set-up. 2. Patient will perform Lower body ADLs with/without adaptive equipment with minimal assistance/contact guard assist .  3. Patient will perform toileting task with minimal assistance/contact guard assist with Fair safety to reduce falls risk. 4. Patient will perform functional transfers with [place] and minimal assistance/contact guard assist.  5. Patient will perform standing static/dynamic balance activities for improved ADL/IADL function with minimal assistance/contact guard assist and Fair balance and safety awarenes. 6. Patient will improve Barthel index scores to atleast 55/100 to improve functional mobility. OCCUPATIONAL THERAPY LONG TERM GOALS   Initiated 5/26/2017 and to be accomplished within 3-4 Week(s)    1. Patient will perform Upper body ADLs with/without adaptive equipment with supervision/set-up. 2. Patient will perform Lower body ADLs with/without adaptive equipment with supervision/set-up . 3. Patient will perform toileting task with supervision/set-up with Good safety to reduce falls risk. 4. Patient will perform functional transfers with Rolling Walker and supervision/set-up and Good balance and safety awareness. 5. Patient will perform standing static/dynamic activity for improved ADL/IADL function with supervision/set-up an and Good balance and safety awareness. 6. Patient will improve Barthel index score to 75/100 to improve independence with mobility.     Therapist: MILTON Tamez 5/26/2017   [facility]   OCCUPATIONAL THERAPY DAILY TREATMENT NOTE        Patient: Rodolfo Patel (89 y.o. male)                            Date: 5/29/2017  Attending Physician: Lon Ibarra MD  Primary Diagnosis: CELLULITIS    Treatment Diagnosis  Treatment Diagnosis: muscle weakness  Treatment Diagnosis 2: difficulty in walking   Precautions : Precautions at Admission: Fall  Vital Signs:  Vital Signs  Pulse (Heart Rate): 75  BP: 100/59     Cognitive Status:     Pain:        Gross Assessment:     Coordination:     Bed Mobility:  Bed Mobility  Rolling: Stand-by asssistance  Sit to Supine: Stand-by asssistance  Transfers:  Functional Transfers  Sit to Stand: Stand-by asssistance  Stand to Sit: Stand-by asssistance  Bed to Chair: Stand-by asssistance (close)     Balance:  Balance  Sitting: Intact  Standing: With support  Standing - Static: Fair (+)  Standing - Dynamic : Fair        Therapeutic Activities:  Assisted patient with bed mobility and bed>w/c transfers in order to assess safety and independence during task. See above for levels of A needed. Cueing needed for patient to stay close to RW for optimal safety. Therapeutic Exercises:   Reviewed UB HEP in order to increase UB muscle strength and ROM needed for UB ADLS.    Patient/Caregiver Education:    Pt. Caretha Dancer Education on see above      ASSESSMENT:  Patient continues to demonstrate the need for skilled Occupational Therapy services to improve dynamic standing balance needed for bathing  Progression toward goals:  [X]      Improving appropriately and progressing toward goals  [ ]      Improving slowly and progressing toward goals  [ ]      Not making progress toward goals and plan of care will be adjusted      Treatment session:   25 minutes     Therapist:    Bonifacio Castaneda [address],  5/29/2017

## 2017-05-30 PROCEDURE — 74011250637 HC RX REV CODE- 250/637: Performed by: INTERNAL MEDICINE

## 2017-05-30 RX ORDER — WARFARIN 2.5 MG/1
5 TABLET ORAL EVERY EVENING
Status: DISCONTINUED | OUTPATIENT
Start: 2017-05-30 | End: 2017-06-01

## 2017-05-30 RX ADMIN — SULFAMETHOXAZOLE AND TRIMETHOPRIM 1 TABLET: 800; 160 TABLET ORAL at 17:30

## 2017-05-30 RX ADMIN — WARFARIN SODIUM 5 MG: 2.5 TABLET ORAL at 17:41

## 2017-05-30 RX ADMIN — Medication: at 18:00

## 2017-05-30 RX ADMIN — SULFAMETHOXAZOLE AND TRIMETHOPRIM 1 TABLET: 800; 160 TABLET ORAL at 09:40

## 2017-05-30 RX ADMIN — Medication: at 08:00

## 2017-05-30 RX ADMIN — FUROSEMIDE 20 MG: 20 TABLET ORAL at 17:30

## 2017-05-30 RX ADMIN — CARVEDILOL 12.5 MG: 12.5 TABLET, FILM COATED ORAL at 17:30

## 2017-05-30 NOTE — PROGRESS NOTES
SW faxed clinical update to Wilman Tidwell at [facility] to request and extension of pt's snf stay.

## 2017-05-30 NOTE — PROGRESS NOTES
Problem: Mobility Impaired (Adult and Pediatric)  Goal: *Acute Goals and Plan of Care (Insert Text)  PHYSICAL THERAPY STG GOALS :  Initiated 5/25/2017 and to be accomplished within 2 Weeks    1. Patient will move from supine to sit and sit to supine and roll side to side in bed with contact guard assist.   2. Patient will transfer from bed to chair and chair to bed with stand by assist using RW. 3. Patient will perform sit to stand with stand by assist with Fair+ balance and safety awareness. 4. Patient will ambulate with stand by assist for 75 feet with RW on level surfaces with 2 turns. 5. Patient will ascend/descend 4 stairs with right handrail(s) withminimal assistance/contact guard assist to allow for safe home access/exit. 6. Patient will improve standardized test score for [place] Standing Balance scale 3+ for reduced fall risk. PHYSICAL THERAPY LTG GOALS :  Initiated 5/25/2017 and to be accomplished within 4 Weeks    1. Patient will move from supine to sit and sit to supine and roll side to side in bed with modified independence. 2. Patient will transfer from bed to chair and chair to bed with modified independence using RW. 3. Patient will perform sit to stand with modified independence with Good balance and safety awareness. 4. Patient will ambulate with supervision/set-up for 150 feet with RW on level surfaces and be able to maneuver through narrow spaces and obstacles without loss of balance. 5. Patient will ascend/descend 4 stairs with right handrail(s) with stand by assist to allow for safe home access/exit. 6. Patient will improve standardized test score for [place] Standing Balance scale 4 for reduced fall risk.      Physical Therapist: Cecy Ospina PT on 5/25/2017    [facility]   PHYSICAL THERAPY DAILY TREATMENT NOTE        Patient: Rocael Callahan (89 y.o. male)               Date: 5/30/2017    Physician: Lobo Oviedo MD  Primary Diagnosis: CELLULITIS Treatment Diagnosis  Treatment Diagnosis: muscle weakness  Treatment Diagnosis 2: difficulty in walking  Precautions: Fall  Vital Signs  Vital Signs  Temp: 96.7 °F (35.9 °C)  Temp Source: Oral  Pulse (Heart Rate): 62  Heart Rate Source: Monitor  Cardiac Rhythm: Paced  Resp Rate: 16  Level of Consciousness: Alert  BP: 100/55  MAP (Calculated): 80  BP 1 Method: Automatic  BP 1 Location: Right arm  BP Patient Position: At rest  MEWS Score: 2     Cognitive Status:  Mental Status  Neurologic State: Alert; Appropriate for age  Orientation Level: Oriented X4  Cognition: Appropriate for age attention/concentration  Pain     Bed Mobility Training  Bed Mobility Training  Rolling: Stand-by asssistance  Supine to Sit: Stand-by asssistance  Sit to Supine: Stand-by asssistance  Balance  Sitting: Intact  Standing: With support  Standing - Static: Fair (+)  Standing - Dynamic : Fair  Transfer Training  Transfer Training  Sit to Stand: Stand-by asssistance  Stand to Sit: Stand-by asssistance  Sit to Stand: Stand-by asssistance  Gait Training  Gait  Base of Support: Center of gravity altered  Speed/Juanita: Slow  Step Length: Left shortened;Right shortened  Gait Abnormalities: Decreased step clearance  Ambulation - Level of Assistance: Stand-by asssistance  Distance (ft): 100 Feet (ft)  Assistive Device: Gait belt;Walker, rolling  Interventions: Safety awareness training     Gait Abnormalities: Decreased step clearance            With 3 turns. Therapeutic Exercise:    Pt continues to require encouragement to participate in skilled physical therapy services. Therapist strongly encourages pt to sit up for meals, but repeated that due to his LEs that he cannot sit up for longer than about an hour. Therapist encouraged pt to call for nurse to assist to chair when breakfast arrives; pt does not appear to be enthusiastic about doing so.  Therapist educated pt on need for compliance with HEP to continue to gain strength, endurance, circulation, and mobility; pt provides a few excuses as to why he cannot complete exercises. TE rendered to address strength, endurance, circulation, and mobility and included: heel raises, toe raises, LAQ, 15 reps x 2 sets with frequent rest breaks. Gait training as mentioned above with verbal cueing for improve proximity to RW for optimal support. Gait training as mentioned with verbal cueing for improved proximity for optimal support. Patient/Caregiver Education:   Pt /Caregiver Education on safety and fall prevention to reduce fall risk. ASSESSMENT:  Patient continues to benefit from Skilled PT services to improve strength, endurance, mobility, balance, and gait. Progression toward goals:  [ ]      Improving appropriately and progressing toward goals  [X]      Improving slowly and progressing toward goals  [ ]      Not making progress toward goals and plan of care will be adjusted      Treatment session: 35 minutes.   Therapist:   Tia Blackburn, PT,          5/30/2017

## 2017-05-30 NOTE — ROUTINE PROCESS
Bedside and Verbal shift change report given to Bruno Rolle (oncoming nurse) by Juanita Velásquez RN (offgoing nurse). Report included the following information SBAR, Kardex and MAR.  [facility] VISUAL CHECKS DONE

## 2017-05-30 NOTE — PROGRESS NOTES
GIM     Patient: Isaac Redmond MRN: [id number]  CSN: [id number]    YOB: 1955  Age: 64 y.o. Sex: male    DOA: 5/24/2017 LOS:  LOS: 6 days                    Subjective:     Pt with chf and venous stasis and cellulitis. Edema noted on adm now resolved and bp lower. Feels well and will d/c zaroxolyn as edema resolved and follow lytes    Objective:      Visit Vitals    /55 (BP 1 Location: Right arm, BP Patient Position: At rest)    Pulse 62    Temp 96.7 °F (35.9 °C)    Resp 16    Ht 5' 11\" (1.803 m)    Wt 142.3 kg (313 lb 12.8 oz)    SpO2 97%    BMI 43.77 kg/m2       Physical Exam:  Chest clear  Cor rr  abd soft  No edema    Intake and Output:  Current Shift:     Last three shifts:       No results found for this or any previous visit (from the past 24 hour(s)).     Current Facility-Administered Medications   Medication Dose Route Frequency    warfarin (COUMADIN) tablet 5 mg  5 mg Oral QPM    [facility] ANESTHESIA   Other PRN    [facility] EMERGENCY/STAT BOX   Other PRN    carvedilol (COREG) tablet 12.5 mg  12.5 mg Oral BID WITH MEALS    trimethoprim-sulfamethoxazole (BACTRIM DS, SEPTRA DS) 160-800 mg per tablet 1 Tab  1 Tab Oral BID    furosemide (LASIX) tablet 20 mg  20 mg Oral BID    nitroglycerin (NITROSTAT) tablet 0.4 mg  0.4 mg SubLINGual Q5MIN PRN    acetaminophen (TYLENOL) tablet 650 mg  650 mg Oral Q6H PRN    traMADol (ULTRAM) tablet 50 mg  50 mg Oral Q6H PRN    magnesium hydroxide (MILK OF MAGNESIA) 400 mg/5 mL oral suspension 30 mL  30 mL Oral DAILY PRN    bisacodyl (DULCOLAX) suppository 10 mg  10 mg Rectal DAILY PRN    sodium phosphate (FLEET'S) enema 118 mL  1 Enema Rectal DAILY PRN    ammonium lactate (LAC-HYDRIN) 12 % lotion   Topical BID       Lab Results   Component Value Date/Time    Glucose 80 05/29/2017 05:28 AM    Glucose 77 05/23/2017 02:59 PM    Glucose 73 05/22/2017 04:50 AM    Glucose 64 05/21/2017 04:00 AM    Glucose 80 05/20/2017 04:40 AM Assessment/Plan     Active Problems:    * No active hospital problems.  Millie Bower MD  5/30/2017, 12:57 PM

## 2017-05-30 NOTE — PROGRESS NOTES
Patient is unable to communicate at this time.  offered prayer and left Spiritual Care brochure. Chaplains will continue to follow and will provide pastoral care on an as needed/requested basis.     [facility]   Staff [address] [place] [address]   [phone]

## 2017-05-30 NOTE — ROUTINE PROCESS
Updated Attending MD regarding low blood pressure trend. Also notified him of holding three AM medications.  MD verbalized Acceptance

## 2017-05-30 NOTE — PROGRESS NOTES
Problem: Self Care Deficits Care Plan (Adult)  Goal: *Acute Goals and Plan of Care (Insert Text)  OCCUPATIONAL THERAPY SHORT TERM GOALS   Initiated 5/26/2017 and to be accomplished within 2 Week(s)    1. Patient will perform Upper body ADLs with/without adaptive equipment with supervision/set-up. 2. Patient will perform Lower body ADLs with/without adaptive equipment with minimal assistance/contact guard assist .  3. Patient will perform toileting task with minimal assistance/contact guard assist with Fair safety to reduce falls risk. 4. Patient will perform functional transfers with Herminia Goodpasture and minimal assistance/contact guard assist.  5. Patient will perform standing static/dynamic balance activities for improved ADL/IADL function with minimal assistance/contact guard assist and Fair balance and safety awarenes. 6. Patient will improve Barthel index scores to atleast 55/100 to improve functional mobility. OCCUPATIONAL THERAPY LONG TERM GOALS   Initiated 5/26/2017 and to be accomplished within 3-4 Week(s)    1. Patient will perform Upper body ADLs with/without adaptive equipment with supervision/set-up. 2. Patient will perform Lower body ADLs with/without adaptive equipment with supervision/set-up . 3. Patient will perform toileting task with supervision/set-up with Good safety to reduce falls risk. 4. Patient will perform functional transfers with Rolling Walker and supervision/set-up and Good balance and safety awareness. 5. Patient will perform standing static/dynamic activity for improved ADL/IADL function with supervision/set-up an and Good balance and safety awareness. 6. Patient will improve Barthel index score to 75/100 to improve independence with mobility.     Therapist: MILTON Umanzor 5/26/2017   [facility]   OCCUPATIONAL THERAPY DAILY TREATMENT NOTE        Patient: Demetrios Jeans (38 y.o. male)                            Date: 5/30/2017  Attending Physician: Lon Ibarra MD  Primary Diagnosis: CELLULITIS    Treatment Diagnosis  Treatment Diagnosis: muscle weakness  Treatment Diagnosis 2: difficulty in walking   Precautions : Precautions at Admission: Fall  Vital Signs:  Vital Signs  Temp: 96.7 °F (35.9 °C)  Temp Source: Oral  Pulse (Heart Rate): 62  Heart Rate Source: Monitor  Cardiac Rhythm: Paced  Resp Rate: 16  Level of Consciousness: Alert  BP: 100/55  MAP (Calculated): 80  BP 1 Method: Automatic  BP 1 Location: Right arm  BP Patient Position: At rest  MEWS Score: 2     Cognitive Status:  Mental Status  Neurologic State: Alert; Appropriate for age  Orientation Level: Oriented X4  Cognition: Appropriate for age attention/concentration  Pain:  Pain Screen  Pain Scale 1: Numeric (0 - 10)  Pain Intensity 1: 0  Patient Stated Pain Goal: 0  Pain Scale 1: Numeric (0 - 10)  Gross Assessment:     Coordination:     Bed Mobility:  Bed Mobility  Rolling: Stand-by asssistance  Supine to Sit: Stand-by asssistance  Sit to Supine: Stand-by asssistance  Transfers:  Functional Transfers  Sit to Stand: Stand-by asssistance  Stand to Sit: Stand-by asssistance     Balance:  Balance  Sitting: Intact  Standing: With support  Standing - Static: Fair (+)  Standing - Dynamic : Fair        Therapeutic Activities:  Static standing activity with one hand release in order to increase standing balance and tolerance needed for ADLS. Patient was able to tolerate two trials, 3:29 and second for 2 mins prior to requesting to sit. Patient reports his B knees limiting his standing. Assisted patient with w/c> standard chair transfers in order to assess safety and independence during routine. ARMSTRONG encouraged patient to increase OOB activity for optimal strengthening. Therapeutic Exercises:  UB strengthening with 3#, 1 set x 20 reps in order to increase functional activity tolerance and UB muscle strength needed for ADLS. Patient/Caregiver Education:    Pt. Caretha Dancer Education on see above. ASSESSMENT:  Patient continues to demonstrate the need for skilled Occupational Therapy services to improve dynamic standing balance needed for bathing  Progression toward goals:  [X]      Improving appropriately and progressing toward goals  [ ]      Improving slowly and progressing toward goals  [ ]      Not making progress toward goals and plan of care will be adjusted      Treatment session:  35 minutes     Therapist:    Leslie Reeder, [address] ,  5/30/2017

## 2017-05-31 LAB
ANION GAP BLD CALC-SCNC: 9 MMOL/L (ref 3–18)
BUN SERPL-MCNC: 17 MG/DL (ref 7–18)
BUN/CREAT SERPL: 15 (ref 12–20)
CALCIUM SERPL-MCNC: 8.9 MG/DL (ref 8.5–10.1)
CHLORIDE SERPL-SCNC: 100 MMOL/L (ref 100–108)
CO2 SERPL-SCNC: 27 MMOL/L (ref 21–32)
CREAT SERPL-MCNC: 1.17 MG/DL (ref 0.6–1.3)
GLUCOSE SERPL-MCNC: 84 MG/DL (ref 74–99)
POTASSIUM SERPL-SCNC: 4.1 MMOL/L (ref 3.5–5.5)
SODIUM SERPL-SCNC: 136 MMOL/L (ref 136–145)

## 2017-05-31 PROCEDURE — 74011250637 HC RX REV CODE- 250/637: Performed by: INTERNAL MEDICINE

## 2017-05-31 PROCEDURE — 36415 COLL VENOUS BLD VENIPUNCTURE: CPT | Performed by: INTERNAL MEDICINE

## 2017-05-31 PROCEDURE — 80048 BASIC METABOLIC PNL TOTAL CA: CPT | Performed by: INTERNAL MEDICINE

## 2017-05-31 RX ORDER — FUROSEMIDE 20 MG/1
20 TABLET ORAL DAILY
Status: DISCONTINUED | OUTPATIENT
Start: 2017-06-01 | End: 2017-06-09 | Stop reason: HOSPADM

## 2017-05-31 RX ADMIN — CARVEDILOL 12.5 MG: 12.5 TABLET, FILM COATED ORAL at 17:57

## 2017-05-31 RX ADMIN — SULFAMETHOXAZOLE AND TRIMETHOPRIM 1 TABLET: 800; 160 TABLET ORAL at 09:51

## 2017-05-31 RX ADMIN — Medication: at 18:00

## 2017-05-31 RX ADMIN — WARFARIN SODIUM 5 MG: 2.5 TABLET ORAL at 17:57

## 2017-05-31 RX ADMIN — FUROSEMIDE 20 MG: 20 TABLET ORAL at 09:50

## 2017-05-31 NOTE — ROUTINE PROCESS
Bedside and Verbal shift change report given to Jaclyn Swenson lpn (oncoming nurse) by tyree hadley (offgoing nurse). Report included the following information SBAR, Kardex and MAR.  Hourly rounds

## 2017-05-31 NOTE — PROGRESS NOTES
GIM     Patient: Kusum Prado MRN: [id number]  CSN: [id number]    YOB: 1955  Age: 64 y.o. Sex: male    DOA: 5/24/2017 LOS:  LOS: 7 days                    Subjective:     Lab today stable and no wt aviable. On lasix and d/c of zaroxolyn. New echo with EF of 35 to 40 %. Legs doing well.  bp low    Objective:      Visit Vitals    BP 92/50    Pulse 65    Temp 97.5 °F (36.4 °C)    Resp 20    Ht 5' 11\" (1.803 m)    Wt 142.3 kg (313 lb 12.8 oz)    SpO2 97%    BMI 43.77 kg/m2       Physical Exam:  Chest clear  Cor rr  abd soft  No edema    Intake and Output:  Current Shift:     Last three shifts:       Recent Results (from the past 24 hour(s))   METABOLIC PANEL, BASIC    Collection Time: 05/31/17  5:45 AM   Result Value Ref Range    Sodium 136 136 - 145 mmol/L    Potassium 4.1 3.5 - 5.5 mmol/L    Chloride 100 100 - 108 mmol/L    CO2 27 21 - 32 mmol/L    Anion gap 9 3.0 - 18 mmol/L    Glucose 84 74 - 99 mg/dL    BUN 17 7.0 - 18 MG/DL    Creatinine 1.17 0.6 - 1.3 MG/DL    BUN/Creatinine ratio 15 12 - 20      GFR est AA >60 >60 ml/min/1.73m2    GFR est non-AA >60 >60 ml/min/1.73m2    Calcium 8.9 8.5 - 10.1 MG/DL       Current Facility-Administered Medications   Medication Dose Route Frequency    warfarin (COUMADIN) tablet 5 mg  5 mg Oral QPM    [facility] ANESTHESIA   Other PRN    [facility] EMERGENCY/STAT BOX   Other PRN    carvedilol (COREG) tablet 12.5 mg  12.5 mg Oral BID WITH MEALS    trimethoprim-sulfamethoxazole (BACTRIM DS, SEPTRA DS) 160-800 mg per tablet 1 Tab  1 Tab Oral BID    furosemide (LASIX) tablet 20 mg  20 mg Oral BID    nitroglycerin (NITROSTAT) tablet 0.4 mg  0.4 mg SubLINGual Q5MIN PRN    acetaminophen (TYLENOL) tablet 650 mg  650 mg Oral Q6H PRN    traMADol (ULTRAM) tablet 50 mg  50 mg Oral Q6H PRN    magnesium hydroxide (MILK OF MAGNESIA) 400 mg/5 mL oral suspension 30 mL  30 mL Oral DAILY PRN    bisacodyl (DULCOLAX) suppository 10 mg  10 mg Rectal DAILY PRN    sodium phosphate (FLEET'S) enema 118 mL  1 Enema Rectal DAILY PRN    ammonium lactate (LAC-HYDRIN) 12 % lotion   Topical BID       Lab Results   Component Value Date/Time    Glucose 84 05/31/2017 05:45 AM    Glucose 80 05/29/2017 05:28 AM    Glucose 77 05/23/2017 02:59 PM    Glucose 73 05/22/2017 04:50 AM    Glucose 64 05/21/2017 04:00 AM        Assessment/Plan     Active Problems:    * No active hospital problems.  Romi Aguilar MD  5/31/2017, 9:51 AM

## 2017-05-31 NOTE — PROGRESS NOTES
LYN spoke with pt earlier today to inform him that SW hasn't received a follow-up response from [employer] re: the extension request. LYN left message for Telly Callahan RN at [employer] requesting a return call re: extension request.

## 2017-05-31 NOTE — ROUTINE PROCESS
Bedside and Verbal shift change report given to Bruon Rolle (oncoming nurse) by Miguelangel Vee RN (offgoing nurse). Report included the following information SBAR, Kardex and MAR. Q VISUAL CHECKS DONE.

## 2017-05-31 NOTE — PROGRESS NOTES
LYN spoke with pt and his daughter re; extension by [employer]. Pt was extended until 6/6/17 with update due on the 6/6/17. LYN explained the rehab process to pt's daughter. She seems supportive and willing to assist with d/c planning. LYN will follow.

## 2017-06-01 LAB
INR PPP: 1.6 (ref 0.8–1.2)
PROTHROMBIN TIME: 18.3 SEC (ref 11.5–15.2)

## 2017-06-01 PROCEDURE — 36415 COLL VENOUS BLD VENIPUNCTURE: CPT | Performed by: INTERNAL MEDICINE

## 2017-06-01 PROCEDURE — 74011250637 HC RX REV CODE- 250/637: Performed by: INTERNAL MEDICINE

## 2017-06-01 PROCEDURE — 85610 PROTHROMBIN TIME: CPT | Performed by: INTERNAL MEDICINE

## 2017-06-01 RX ORDER — WARFARIN 2 MG/1
6 TABLET ORAL EVERY EVENING
Status: DISCONTINUED | OUTPATIENT
Start: 2017-06-01 | End: 2017-06-05

## 2017-06-01 RX ADMIN — FUROSEMIDE 20 MG: 20 TABLET ORAL at 08:44

## 2017-06-01 RX ADMIN — CARVEDILOL 12.5 MG: 12.5 TABLET, FILM COATED ORAL at 17:52

## 2017-06-01 RX ADMIN — WARFARIN SODIUM 6 MG: 2 TABLET ORAL at 17:51

## 2017-06-01 RX ADMIN — Medication: at 08:45

## 2017-06-01 RX ADMIN — CARVEDILOL 12.5 MG: 12.5 TABLET, FILM COATED ORAL at 08:44

## 2017-06-01 RX ADMIN — Medication: at 17:52

## 2017-06-01 NOTE — ROUTINE PROCESS
Bedside and Verbal shift change report given to CHARLIE Perry,RN (oncoming nurse) by LATASHA Taveras LPN (offgoing nurse). Report included the following information SBAR, Kardex and MAR.

## 2017-06-01 NOTE — ROUTINE PROCESS
Bedside and Verbal shift change report given to julio c cunningham lpn (oncoming nurse) by reinaldo Flanagan lpn (offgoing nurse). Report included the following information SBAR, Kardex and MAR.  Hourly rounds

## 2017-06-01 NOTE — PROGRESS NOTES
Problem: Mobility Impaired (Adult and Pediatric)  Goal: *Acute Goals and Plan of Care (Insert Text)  PHYSICAL THERAPY STG GOALS :  Initiated 5/25/2017 and to be accomplished within 2 Weeks (updated 5/31/17)    1. Patient will move from supine to sit and sit to supine and roll side to side in bed with contact guard assist. (Achieved)   2. Patient will transfer from bed to chair and chair to bed with stand by assist using RW. (Achieved)  3. Patient will perform sit to stand with stand by assist with Fair+ balance and safety awareness. (Achieved)  4. Patient will ambulate with stand by assist for 75 feet with RW on level surfaces with 2 turns. (Achieved)  5. Patient will ascend/descend 4 stairs with right handrail(s) withminimal assistance/contact guard assist to allow for safe home access/exit. (Achieved)  6. Patient will improve standardized test score for [place] Standing Balance scale 3+ for reduced fall risk. (Progressing; 2+)    PHYSICAL THERAPY LTG GOALS :  Initiated 5/25/2017 and to be accomplished within 4 Weeks    1. Patient will move from supine to sit and sit to supine and roll side to side in bed with modified independence. 2. Patient will transfer from bed to chair and chair to bed with modified independence using RW. 3. Patient will perform sit to stand with modified independence with Good balance and safety awareness. 4. Patient will ambulate with supervision/set-up for 150 feet with RW on level surfaces and be able to maneuver through narrow spaces and obstacles without loss of balance. 5. Patient will ascend/descend 4 stairs with right handrail(s) with stand by assist to allow for safe home access/exit. 6. Patient will improve standardized test score for [place] Standing Balance scale 4 for reduced fall risk.      Physical Therapist: Cecy Ospina, PT on 5/25/2017    [facility]   PHYSICAL THERAPY WEEKLY PROGRESS REPORT  Reporting Period:  Date:   5/25/17  to 6/01/17        Patient: Dharmesh Matthews (89 y.o. male)                         Date: 6/1/2017    Primary Diagnosis: CELLULITIS                          Attending Physician: Donita Briscoe MD   Treatment Diagnosis  Treatment Diagnosis: muscle weakness  Treatment Diagnosis 2: difficulty in walking  Precautions:  Fall  Rehab Potential : Good:     Skill interventions and education provided with clinical rationale (include individualized treatment techniques and standardized tests):   Skilled Physical Therapy services were provided with: Therapeutic exercises rendered to address strength, endurance, mobility. Therapeutic activities rendered to address bed mobility, transfers. Gait training rendered to address gait deficits. Neuromuscular re-education rendered to address balance impairments. Using a comparative statement, summarize significant progress toward goals as a result of skilled intervention provided:  Patient has made Fair progress towards their Physical Therapy goals in the areas of bed mobility, balance, transfers, gait, and stairs. Pt has achieved 5/6 STGs and is progressing well towards achieving balance goal.    Identify remaining functional areas, impairments limiting progress and/or barriers to improvement:  Patient would benefit from continues PT services to address the following functional deficits in standing balance, stair negotiation, ambulation using RW. Pt is impaired by BLE pain.        OBJECTIVE DATA SUMMARY:       INITIAL ASSESSMENT WEEKLY ASSESSMENT   COGNITIVE STATUS COGNITIVE STATUS   Neurologic State: Alert, Appropriate for age  Orientation Level: Oriented X4  Cognition: Appropriate for age attention/concentration  Perception: Appears intact  Perseveration: No perseveration noted  Safety/Judgement: Fall prevention Neurologic State: Alert, Appropriate for age  Orientation Level: Oriented X4  Cognition: Appropriate for age attention/concentration  Perception: Appears intact  Perseveration: No perseveration noted  Safety/Judgement: Fall prevention   PAIN PAIN   Pain Scale 1: Numeric (0 - 10)  Pain Intensity 1: 0  Patient Stated Pain Goal: 0 Pain Scale 1: Numeric (0 - 10)  Pain Intensity 1: 1  Patient Stated Pain Goal: 0   GROSS ASSESSMENT GROSS ASSESSMENT   AROM: Generally decreased, functional  PROM: Generally decreased, functional  Strength: Generally decreased, functional (BLEs grossly 4+/5, except B hips at 3/5)  Coordination: Generally decreased, functional  Tone: Normal  Sensation: Intact AROM: Generally decreased, functional  PROM: Generally decreased, functional  Strength: Generally decreased, functional  Coordination: Generally decreased, functional  Tone: Normal  Sensation: Intact   BED MOBILITY BED MOBILITY   Rolling:  (not assessed)  Supine to Sit: Stand-by asssistance  Sit to Supine: Stand-by asssistance Rolling: Modified independent  Supine to Sit: Modified independent  Sit to Supine: Stand-by asssistance   GAIT GAIT   Base of Support: Center of gravity altered  Speed/Juanita: Pace decreased (<100 feet/min)  Step Length: Left shortened, Right shortened  Gait Abnormalities: Decreased step clearance  Ambulation - Level of Assistance: Contact guard assistance  Distance (ft): 20 Feet (ft)  Assistive Device: Walker, rolling  Interventions: Safety awareness training Base of Support: Center of gravity altered  Speed/Juanita: Pace decreased (<100 feet/min)  Step Length: Left shortened, Right shortened  Gait Abnormalities: Decreased step clearance  Ambulation - Level of Assistance: Supervision  Distance (ft): 100 Feet (ft)  Assistive Device: Gait belt, Walker, rolling  Interventions: Safety awareness training   Exceptions to WDL Exceptions to WDL               TRANSFERS TRANSFERS   Sit to Stand: Contact guard assistance  Stand to Sit: Contact guard assistance  Bed to Chair: Minimum assistance Sit to Stand: Modified independent  Stand to Sit: Modified independent  Bed to Chair: Stand-by asssistance (close)   BALANCE BALANCE   Sitting: Intact  Standing: With support  Standing - Static: Fair  Standing - Dynamic : Fair Sitting: Intact  Standing: With support  Standing - Static: Fair (+)  Standing - Dynamic : Fair (+)   WHEELCHAIR MOBILITY/MGMT WHEELCHAIR MOBILITY/MGMT         Activity Tolerance:  Fair Activity Tolerance: Good   Visual/Perceptual   Tracking: Able to track stimulus in all quadrants w/o difficulty        Visual/Perceptual   Vision  Tracking: Able to track stimulus in all quadrants w/o difficulty         Auditory:   Auditory Impairment: None      Auditory:   Auditory  Auditory Impairment: None         Steps: both   Clinical Decision makin+ Clinical Decision makin+ on [place] Standing Balance Scale      Treatment:   Please see treatment note from 17 for details on tx. Pt progressing well. Continue POC. Patient's response to treatment rendered:  Fair+     Patient expected Discharge Location:  [X]Private Residence  [ ] [facility]  [ [facility]  [ ]Other:     Plan: Continue Skilled PT services as established by the Plan of Care for 5-6 times a week. PT and Assistant have had a weekly case conference regarding the above treatment:  [X] Yes     [ ] No        Treatment session:  0 minutes. Therapist: Tyesha Martin, PT       Date:2017, late entry  Forward to PT for co-signature when completed.

## 2017-06-01 NOTE — ROUTINE PROCESS
Bedside and verbal shift report given to MARYSOL Garcia LPN (oncoming nurse) by CLINTON Manzanares LPN (off going nurse). Report included SBAR, MAR and Kardex. Hourly rounds completed.

## 2017-06-01 NOTE — PROGRESS NOTES
Nutrition follow up-[facility]/  Plan of care      RECOMMENDATIONS:     1. Cardiac diet  2. Monitor weight and PO intake  3. RD to follow     GOALS:     1. Met/Ongoing: PO intake meets >75% of protein/calorie needs by 6/8  2. Ongoing: Weight Maintenance/Gradual weight loss (1-2 lb by 6/8)    ASSESSMENT:     Weight status is classified as obese per BMI of 43.8. PO intake is adequate. New weight n/a on record. Labs noted. Nutrition recommendations listed. RD to follow. Nutrition Diagnoses:   Obesity related to excessive energy intake as evidenced by BMI of 43.8. Nutrition Risk:  [] High  [] Moderate [x]  Low    SUBJECTIVE/OBJECTIVE:      Transferred from [address] [place] [address] to [facility] on 5/24/17. Admitted with cellulitis of abdominal wall and both lower extremities. Patient appears well nourished. Patient with a good appetite and eating 100% of meals. No known food allergies. Will monitor.     Information Obtained from:    [x] Chart Review   [x] Patient   [] Family/Caregiver   [] Nurse/Physician   [] Interdisciplinary Meeting/Rounds    Diet: Cardiac diet  Medications: [x] Reviewed    Allergies: [x] Reviewed   Patient Active Problem List   Diagnosis Code    Cellulitis of abdominal wall L03.311    Cellulitis of both lower extremities L03.115, L03.116     Past Medical History:   Diagnosis Date    CAD (coronary artery disease)     Heart failure ([facility] [address]     Hypertension       Labs:    Lab Results   Component Value Date/Time    Sodium 136 05/31/2017 05:45 AM    Potassium 4.1 05/31/2017 05:45 AM    Chloride 100 05/31/2017 05:45 AM    CO2 27 05/31/2017 05:45 AM    Anion gap 9 05/31/2017 05:45 AM    Glucose 84 05/31/2017 05:45 AM    BUN 17 05/31/2017 05:45 AM    Creatinine 1.17 05/31/2017 05:45 AM    Calcium 8.9 05/31/2017 05:45 AM    Magnesium 1.9 05/17/2017 11:48 PM    Albumin 2.4 05/17/2017 11:48 PM     Anthropometrics: BMI (calculated): 43.8  Last 3 Recorded Weights in this Encounter    05/24/17 1632   Weight: 142.3 kg (313 lb 12.8 oz)      Ht Readings from Last 1 Encounters:   05/24/17 5' 11\" (1.803 m)     - New weight n/a on record. No data found. IBW: 172 lb %IBW: 182%    Nutrition Needs:   Calories: 0100-2433 Kcal   Protein:   114 g      [x] No Cultural, Adventism or ethnic dietary need identified.     [] Cultural, Adventism and ethnic food preferences identified and addressed     Wt Status:  [] Normal (18.6 - 24.9) [] Underweight (<18.5) [] Overweight (25 - 29.9) [] Mild Obesity (30 - 34.9)  [] Moderate Obesity (35 - 39.9) [x] Morbid Obesity (40+)     Nutrition Problems Identified:   [] Suboptimal PO intake   [] Food Allergies  [] Difficulty chewing/swallowing/poor dentition  [] Constipation/Diarrhea   [] Nausea/Vomiting   [] None  [x] Other: Excessive energy intake    Plan:   [x] Therapeutic Diet  []  Obtained/adjusted food preferences/tolerances and/or snacks options   []  Supplements added   [] Occupational therapy following for feeding techniques  []  HS snack added   []  Modify diet texture   []  Modify diet for food allergies   []  Assist with menu selection   [x]  Monitor PO intake on meal rounds   [x]  Continue inpatient monitoring and intervention   [x]  Participated in discharge planning/Interdisciplinary rounds/Team meetings   []  Other:     Education Needs:   [] Not appropriate for teaching at this time due to:   [x] Identified and addressed    Nutrition Monitoring and Evaluation:  [x] Continue ongoing monitoring and intervention  [] Tony Hernández

## 2017-06-01 NOTE — PROGRESS NOTES
Problem: Self Care Deficits Care Plan (Adult)  Goal: *Acute Goals and Plan of Care (Insert Text)  OCCUPATIONAL THERAPY SHORT TERM GOALS     Updated 6/01.17    1. Patient will perform Upper body ADLs with/without adaptive equipment with Mod I.  2. Patient will perform Lower body ADLs with/without adaptive equipment with SBA. 3. Patient will perform toileting task with Mod I with Fair+ safety to reduce falls risk. 4. Patient will perform functional transfers with Rolling Walker and Mod I.  5. Patient will perform standing static/dynamic balance activities for improved ADL/IADL function with Supervision and Fair+ balance and safety awarenes. 6. Patient will improve Barthel index scores to atleast 75/100 to improve functional mobility. Initiated 5/26/2017 and to be accomplished within 2 Week(s)    1. Patient will perform Upper body ADLs with/without adaptive equipment with supervision/set-up. (goal met-UPG Mod I)  2. Patient will perform Lower body ADLs with/without adaptive equipment with minimal assistance/contact guard assist . (goal met-UPG SBA)  3. Patient will perform toileting task with minimal assistance/contact guard assist with Fair safety to reduce falls risk. (goal met-UPG Mod I)  4. Patient will perform functional transfers with Rolling Walker and minimal assistance/contact guard assist.(goal met-UPG Mod I)  5. Patient will perform standing static/dynamic balance activities for improved ADL/IADL function with minimal assistance/contact guard assist and Fair balance and safety awarenes. (goal met-UPG Supervision)  6. Patient will improve Barthel index scores to atleast 55/100 to improve functional mobility. (goal met-UPG 75/100)    OCCUPATIONAL THERAPY LONG TERM GOALS   Initiated 5/26/2017 and to be accomplished within 3-4 Week(s)    1. Patient will perform Upper body ADLs with/without adaptive equipment with supervision/set-up.   2. Patient will perform Lower body ADLs with/without adaptive equipment with supervision/set-up . 3. Patient will perform toileting task with supervision/set-up with Good safety to reduce falls risk. 4. Patient will perform functional transfers with Rolling Walker and supervision/set-up and Good balance and safety awareness. 5. Patient will perform standing static/dynamic activity for improved ADL/IADL function with supervision/set-up an and Good balance and safety awareness. 6. Patient will improve Barthel index score to 75/100 to improve independence with mobility. Therapist: Arin Combs. 5/26/2017   [facility]  OCCUPATIONAL THERAPY WEEKLY SUMMARY   Reporting period:  from 5/26/17 through 6/01/17         Patient: America Land (72 y.o. male)                                   Date: 6/1/2017    Primary Diagnosis: CELLULITIS                                      Attending Physician: Kelsy Birmingham MD Treatment Diagnosis  Treatment Diagnosis: muscle weakness  Treatment Diagnosis 2: difficulty in walking  Precautions: Fall  Rehab Potential : [facility] interventions and education provided with clinical rationale (include individualized treatment techniques and standardized tests):  Skilled Occupational services were provided utilizing therapeutic exericises, therapeutic activities, functional mobility, functional activities, and EC/WS. Using a comparative statement, summarize significant progress toward goals as a result of skilled intervention provided:  Patient has made Good progress towards their Occupational Therapy goals in the following areas: increased independence and performance with grooming, bathing, upper body dressing, lower body dressing, toileting and toilet transfer . Patient has met 6/6 STGS and making good progression towards LTGS.  Identify remaining functional areas, impairments limiting progress and/or barriers to improvement:  Patient would benefit from continued skilled Occupational Therapy Services to address the following functional deficits in decreased dynamic standing balance and tolerance needed for ADLS. OBJECTIVE DATA SUMMARY:          INITIAL ASSESSMENT WEEKLY PROGRESS   COGNITIVE STATUS: COGNITIVE STATUS:   Neurologic State: Alert, Appropriate for age  Orientation Level: Oriented X4  Cognition: Appropriate for age attention/concentration  Perception: Appears intact  Perseveration: No perseveration noted  Safety/Judgement: Fall prevention Neurologic State: Alert, Appropriate for age  Orientation Level: Oriented X4  Cognition: Appropriate for age attention/concentration  Perception: Appears intact  Perseveration: No perseveration noted  Safety/Judgement: Fall prevention   PAIN: PAIN:   Pain Scale 1: Numeric (0 - 10)  Pain Intensity 1: 0  Patient Stated Pain Goal: 0       Pain Scale 1: Numeric (0 - 10)  Pain Intensity 1: 0  Patient Stated Pain Goal: 0   BED MOBILITY BED MOBILITY   Rolling:  (not assessed)  Supine to Sit: Stand-by asssistance  Sit to Supine: Stand-by asssistance Rolling: Stand-by asssistance  Supine to Sit: Modified independent  Sit to Supine: Stand-by asssistance   ADL SELF CARE ADL SELF CARE   Feeding: Independent  Oral Facial Hygiene/Grooming: Setup  Bathing: Moderate assistance  Upper Body Dressing: Minimum assistance  Lower Body Dressing: Moderate assistance  Toileting: Moderate assistance Bathing Assistance: Modified independent  Position Performed: Standing     Dressing Assistance: Modified independent  Hospital Gown: Modified independent     Bathing Assistance: Modified independent  Perineal  : Modified independent  Position Performed: Standing     Toileting Assistance: Moderate assistance  Bladder Hygiene:  Moderate assistance  Clothing Management: Moderate assistance     Grooming Assistance: Modified independent (standing sinkside)  Washing Face: Modified independent  Brushing Teeth: Modified independent               TRANSFERS TRANSFERS   Sit to Stand: Contact guard assistance  Stand to Sit: Contact guard assistance  Bed to Chair: Minimum assistance  Toilet Transfer : Minimum assistance (chair to bsc) Sit to Stand: Modified independent  Stand to Sit: Stand-by asssistance  Bed to Chair: Stand-by asssistance (close)  Toilet Transfer : Supervision   Bathroom Mobility: Supervision/set up  Toilet Transfer : Minimum assistance (chair to bsc) Bathroom Mobility: Modified independent  Toilet Transfer : Supervision   BALANCE BALANCE   Sitting: Intact  Standing: With support  Standing - Static: Fair  Standing - Dynamic : Fair Sitting: With support  Standing: With support  Standing - Static: Fair (+)  Standing - Dynamic : Fair         GROSS ASSESSMENT  GROSS ASSESSMENT   AROM: Generally decreased, functional  PROM: Generally decreased, functional  Strength: Generally decreased, functional (BLEs grossly 4+/5, except B hips at 3/5)  Coordination: Generally decreased, functional  Tone: Normal  Sensation: Intact AROM: Generally decreased, functional  PROM: Generally decreased, functional  Strength: Generally decreased, functional  Coordination: Generally decreased, functional  Tone: Normal  Sensation: Intact   COORDINATION COORDINATION   Fine Motor Skills-Upper: Left Intact, Right Intact  Gross Motor Skills-Upper: Left Intact, Right Intact Fine Motor Skills-Upper: Left Intact, Right Intact  Gross Motor Skills-Upper: Left Intact, Right Intact   VISUAL/PERCEPTUAL VISUAL/PERCEPTUAL   Tracking: Able to track stimulus in all quadrants w/o difficulty   Tracking: Able to track stimulus in all quadrants w/o difficulty     AUDITORY: AUDITORY:   Auditory Impairment: None Auditory Impairment: None         INSTRUMENTAL  ADL'S:    INSTRUMENTAL ADL'S:                  THE BARTHEL INDEX  ACTIVITY    SCORE   FEEDING  0=unable  5=needs help cutting,spreading butter,etc., or modified diet  10= independent    10   BATHING  0=dependent  5=independent (or in shower    0   GROOMING  0=needs help  5=independent face/hair/teeth/shaving (implements provided)    5   DRESSING  0=dependent  5=needs help but can do about half unaided  10=independent(including buttons, zips,laces etc.)    10   BOWELS  0=incontinent  5=occasional accident  10=continent    10   BLADDER  0=incontinent, or catheterized and unable to manage alone  5=occasional accident  10=continent    10   TOILET USE  0=dependent  5=needs some help, but can do something alone  10=independent (on and off, dressing, wiping)    10   TRANSFER (BED TO CHAIR AND BACK)  0=unable, no sitting balance  5=major help(one or two people,physical), can sit  10=minor help(verbal or physical)  15=independent    10   MOBILITY (ON LEVEL SURFACES)  0=immobile or <50 yards  5=wheelchair independent,including corners,>50 yards  10=walkes with help of one person (verbal or physical) >50 yards  15=independent(but may use any aid; for example, stick) >50 yards    10   STAIRS  0=unable  5=needs help (verbal, physical, carrying aid)  10=independent    0             TOTAL:                  75/100      Treatment:  Completed morning ADLS with patient in order to assess safety and independence during routine. See above for levels of A needed. Attempted shower assessment however patient reports L LE wound still open and would like to wait until closed. Patient informed ARMSTRONG he will purchase a shower bench upon return home. Discussed with patient if his daughter would be home with him upon discharge. Patient stated she would. Patient's response to Treatment rendered:  Patient is pleasant and receptive to therapist cueing. Patient expected Discharge Location:  [X]Private Residence  [ ] [facility]  [ [facility]  [ ]Other:     Plan: Continue OT services as established on the Plan of Care for 5 times a week.   Treatment Minutes:  72  OT and Assistant have had a weekly case conference regarding the above treatment:  [X] Yes     [ ] No    Therapist:   Herbie Gonzalez,         Date:6/1/2017 Forward to OT for co-signature when completed

## 2017-06-01 NOTE — PROGRESS NOTES
Problem: Mobility Impaired (Adult and Pediatric)  Goal: *Acute Goals and Plan of Care (Insert Text)  PHYSICAL THERAPY STG GOALS :  Initiated 5/25/2017 and to be accomplished within 2 Weeks (updated 5/31/17)    1. Patient will move from supine to sit and sit to supine and roll side to side in bed with contact guard assist. (Achieved)   2. Patient will transfer from bed to chair and chair to bed with stand by assist using RW. (Achieved)  3. Patient will perform sit to stand with stand by assist with Fair+ balance and safety awareness. (Achieved)  4. Patient will ambulate with stand by assist for 75 feet with RW on level surfaces with 2 turns. (Achieved)  5. Patient will ascend/descend 4 stairs with right handrail(s) withminimal assistance/contact guard assist to allow for safe home access/exit. (Achieved)  6. Patient will improve standardized test score for [place] Standing Balance scale 3+ for reduced fall risk. (Progressing; 2+)    PHYSICAL THERAPY LTG GOALS :  Initiated 5/25/2017 and to be accomplished within 4 Weeks    1. Patient will move from supine to sit and sit to supine and roll side to side in bed with modified independence. 2. Patient will transfer from bed to chair and chair to bed with modified independence using RW. 3. Patient will perform sit to stand with modified independence with Good balance and safety awareness. 4. Patient will ambulate with supervision/set-up for 150 feet with RW on level surfaces and be able to maneuver through narrow spaces and obstacles without loss of balance. 5. Patient will ascend/descend 4 stairs with right handrail(s) with stand by assist to allow for safe home access/exit. 6. Patient will improve standardized test score for [place] Standing Balance scale 4 for reduced fall risk.      Physical Therapist: Paola Smith, PT on 5/25/2017    [address] [facility]   PHYSICAL THERAPY DAILY TREATMENT NOTE        Patient: So Fan (71 y.o. male) Date: 6/1/2017    Physician: Hai Oquendo MD  Primary Diagnosis: CELLULITIS          Treatment Diagnosis  Treatment Diagnosis: muscle weakness  Treatment Diagnosis 2: difficulty in walking  Precautions: Fall  Vital Signs  Vital Signs  Temp: 97.2 °F (36.2 °C)  Temp Source: Oral  Pulse (Heart Rate): 67  Heart Rate Source: Monitor  Resp Rate: 18  Level of Consciousness: Alert  BP: 116/79  MAP (Calculated): 91  BP 1 Method: Automatic  BP 1 Location: Right arm  BP Patient Position: Sitting  MEWS Score: 1     Cognitive Status:  Mental Status  Neurologic State: Alert; Appropriate for age  Orientation Level: Oriented X4  Cognition: Appropriate for age attention/concentration  Pain  Pain Screen  Pain Scale 1: Numeric (0 - 10)  Pain Intensity 1: 1  Patient Stated Pain Goal: 0  Bed Mobility Training  Bed Mobility Training  Rolling: Modified independent  Supine to Sit: Modified independent  Balance  Sitting: Intact  Standing: With support  Standing - Static: Fair (+)  Standing - Dynamic : Fair (+)  Transfer Training  Transfer Training  Sit to Stand: Modified independent  Stand to Sit: Modified independent  Sit to Stand: Modified independent  Gait Training  Gait  Base of Support: Center of gravity altered  Speed/Juanita: Pace decreased (<100 feet/min)  Step Length: Left shortened;Right shortened  Gait Abnormalities: Decreased step clearance  Ambulation - Level of Assistance: Supervision  Distance (ft): 100 Feet (ft)  Assistive Device: Gait belt;Walker, rolling  Rail Use: Both  Stairs - Level of Assistance: Contact guard assistance  Number of Stairs Trained: 5  Interventions: Safety awareness training     Gait Abnormalities: Decreased step clearance            With 3 turns. Therapeutic Exercise:    Gait training as mentioned above, stair training as mentioned above. Stair training with verbal cueing for proper sequencing considering L knee pain.  TE rendered to address strength, endurance, and mobility and included: heel raises, toe raises, LAQ 20 reps x 2 sets. Patient/Caregiver Education:   Pt /Caregiver Education on safety and fall prevention to reduce fall risk. ASSESSMENT:  Patient continues to benefit from Skilled PT services to improve strength, endurance, mobility, gait, balance. Progression toward goals:  [ ]      Improving appropriately and progressing toward goals  [X]      Improving slowly and progressing toward goals  [ ]      Not making progress toward goals and plan of care will be adjusted      Treatment session: 30 minutes.   Therapist:   Shellie Spurling, PT,          6/1/2017

## 2017-06-02 PROCEDURE — 77030011256 HC DRSG MEPILEX <16IN NO BORD MOLN -A

## 2017-06-02 PROCEDURE — 74011250637 HC RX REV CODE- 250/637: Performed by: INTERNAL MEDICINE

## 2017-06-02 RX ADMIN — Medication: at 18:00

## 2017-06-02 RX ADMIN — FUROSEMIDE 20 MG: 20 TABLET ORAL at 09:10

## 2017-06-02 RX ADMIN — CARVEDILOL 12.5 MG: 12.5 TABLET, FILM COATED ORAL at 18:08

## 2017-06-02 RX ADMIN — WARFARIN SODIUM 6 MG: 2 TABLET ORAL at 18:08

## 2017-06-02 RX ADMIN — Medication: at 09:11

## 2017-06-02 RX ADMIN — CARVEDILOL 12.5 MG: 12.5 TABLET, FILM COATED ORAL at 09:09

## 2017-06-02 NOTE — ROUTINE PROCESS
Bedside and verbal shift report given to MARYSOL Xiao LPN (oncoming nurse) by CLINTON Cobb LPN (off going nurse). Report included SBAR, MAR and Kardex.

## 2017-06-02 NOTE — ROUTINE PROCESS
Bedside and Verbal shift change report given to CLINTON Harris LPN (oncoming nurse) by CHARLIE Murphy RN (offgoing nurse). Report included the following information SBAR, Kardex and MAR.

## 2017-06-02 NOTE — PROGRESS NOTES
Problem: Self Care Deficits Care Plan (Adult)  Goal: *Acute Goals and Plan of Care (Insert Text)  OCCUPATIONAL THERAPY SHORT TERM GOALS     Updated 6/01.17    1. Patient will perform Upper body ADLs with/without adaptive equipment with Mod I.  2. Patient will perform Lower body ADLs with/without adaptive equipment with SBA. 3. Patient will perform toileting task with Mod I with Fair+ safety to reduce falls risk. 4. Patient will perform functional transfers with Rolling Walker and Mod I.  5. Patient will perform standing static/dynamic balance activities for improved ADL/IADL function with Supervision and Fair+ balance and safety awarenes. 6. Patient will improve Barthel index scores to atleast 75/100 to improve functional mobility. Initiated 5/26/2017 and to be accomplished within 2 Week(s)    1. Patient will perform Upper body ADLs with/without adaptive equipment with supervision/set-up. (goal met-UPG Mod I)  2. Patient will perform Lower body ADLs with/without adaptive equipment with minimal assistance/contact guard assist . (goal met-UPG SBA)  3. Patient will perform toileting task with minimal assistance/contact guard assist with Fair safety to reduce falls risk. (goal met-UPG Mod I)  4. Patient will perform functional transfers with Rolling Walker and minimal assistance/contact guard assist.(goal met-UPG Mod I)  5. Patient will perform standing static/dynamic balance activities for improved ADL/IADL function with minimal assistance/contact guard assist and Fair balance and safety awarenes. (goal met-[facility] Supervision)  6. Patient will improve Barthel index scores to atleast 55/100 to improve functional mobility. (goal met-UPG 75/100)    OCCUPATIONAL THERAPY LONG TERM GOALS   Initiated 5/26/2017 and to be accomplished within 3-4 Week(s)    1. Patient will perform Upper body ADLs with/without adaptive equipment with supervision/set-up.   2. Patient will perform Lower body ADLs with/without adaptive equipment with supervision/set-up . 3. Patient will perform toileting task with supervision/set-up with Good safety to reduce falls risk. 4. Patient will perform functional transfers with Rolling Walker and supervision/set-up and Good balance and safety awareness. 5. Patient will perform standing static/dynamic activity for improved ADL/IADL function with supervision/set-up an and Good balance and safety awareness. 6. Patient will improve Barthel index score to 75/100 to improve independence with mobility.     Therapist: Arin Sheehan. 5/26/2017   [address] [facility]   OCCUPATIONAL THERAPY DAILY TREATMENT NOTE        Patient: Deidre Grimm (38 y.o. male)                            Date: 6/2/2017  Attending Physician: Bin Swenson MD  Primary Diagnosis: CELLULITIS    Treatment Diagnosis  Treatment Diagnosis: muscle weakness  Treatment Diagnosis 2: difficulty in walking   Precautions : Precautions at Admission: Fall  Vital Signs:        Cognitive Status:  Mental Status  Neurologic State: Alert  Orientation Level: Oriented X4  Cognition: Appropriate for age attention/concentration  Pain:  Pain Screen  Pain Scale 1: Numeric (0 - 10)  Pain Intensity 1: 0  Patient Stated Pain Goal: 0  Pain Scale 1: Numeric (0 - 10)  Gross Assessment:     Coordination:     Bed Mobility:  Bed Mobility  Rolling: Modified independent  Supine to Sit: Modified independent  Sit to Supine: Modified independent  Transfers:  Functional Transfers  Sit to Stand: Modified independent  Stand to Sit: Modified independent  Functional Transfers  Bathroom Mobility: Modified independent  Balance:  Balance  Sitting: Intact  Standing: With support  Standing - Static: Good  Standing - Dynamic : Fair  ADL Self Care:     ADL Intervention:           Toileting  Toileting Assistance: Modified independent                    Instrumental ADL's:   Pt. Able to don and doff comfort boots and cam shoes YOLANDA  Therapeutic Activities:  Pt. Able to complete all Functional transfers YOLANDA   Therapeutic Exercises:   Pt. Participated in 4# Dowel for UE strengthening of over head press, chest press, bicep curls 3 X 15 reps ea. Pt. Also worked on NUT and BOLT activity for finger dexterity, pinch /grasp, in hand manipulation, and opposition in order to improve clothing management of zippers, buttons and snaps as well as strengthening for functional transfers, functional mobility and BADL's   Patient/Caregiver Education:    PtElliott Haile Education on Rational for strengthening of UE's  was provided to  Pt. Who demonstrated proper tech and sequencing for all strengthening exercises. .        ASSESSMENT:  Patient continues to demonstrate the need for skilled Occupational Therapy services to improve Strengthening, Endurance, simple home management  Progression toward goals:  [X]      Improving appropriately and progressing toward goals  [ ]      Improving slowly and progressing toward goals  [ ]      Not making progress toward goals and plan of care will be adjusted      Treatment session:   55 minutes     Therapist:    Danny Francois PTA,  6/2/2017

## 2017-06-02 NOTE — ROUTINE PROCESS
Bedside and verbal shift report given to  LPN (oncoming nurse) by Kathleen Cam LPN (off going nurse). Report included SBAR, MAR and Kardex.

## 2017-06-02 NOTE — PROGRESS NOTES
Problem: Mobility Impaired (Adult and Pediatric)  Goal: *Acute Goals and Plan of Care (Insert Text)  PHYSICAL THERAPY STG GOALS :  Initiated 5/25/2017 and to be accomplished within 2 Weeks (updated 5/31/17)    1. Patient will move from supine to sit and sit to supine and roll side to side in bed with contact guard assist. (Achieved)   2. Patient will transfer from bed to chair and chair to bed with stand by assist using RW. (Achieved)  3. Patient will perform sit to stand with stand by assist with Fair+ balance and safety awareness. (Achieved)  4. Patient will ambulate with stand by assist for 75 feet with RW on level surfaces with 2 turns. (Achieved)  5. Patient will ascend/descend 4 stairs with right handrail(s) withminimal assistance/contact guard assist to allow for safe home access/exit. (Achieved)  6. Patient will improve standardized test score for [place] Standing Balance scale 3+ for reduced fall risk. (Progressing; 2+)    PHYSICAL THERAPY LTG GOALS :  Initiated 5/25/2017 and to be accomplished within 4 Weeks    1. Patient will move from supine to sit and sit to supine and roll side to side in bed with modified independence. 2. Patient will transfer from bed to chair and chair to bed with modified independence using RW. 3. Patient will perform sit to stand with modified independence with Good balance and safety awareness. 4. Patient will ambulate with supervision/set-up for 150 feet with RW on level surfaces and be able to maneuver through narrow spaces and obstacles without loss of balance. 5. Patient will ascend/descend 4 stairs with right handrail(s) with stand by assist to allow for safe home access/exit. 6. Patient will improve standardized test score for [place] Standing Balance scale 4 for reduced fall risk.      Physical Therapist: Julio Cesar Schrader, PT on 5/25/2017    [address] [facility]   PHYSICAL THERAPY DAILY TREATMENT NOTE        Patient: Dharmesh Matthews (78 y.o. male) Date: 6/2/2017    Physician: David Adams MD  Primary Diagnosis: CELLULITIS          Treatment Diagnosis  Treatment Diagnosis: muscle weakness  Treatment Diagnosis 2: difficulty in walking  Precautions: Fall  Vital Signs  Vital Signs  BP: 120/78     Cognitive Status:  Mental Status  Neurologic State: Alert  Orientation Level: Oriented X4  Cognition: Appropriate for age attention/concentration  Pain  Pain Screen  Pain Scale 1: Numeric (0 - 10)  Pain Intensity 1: 0  Patient Stated Pain Goal: 0  Bed Mobility Training  Bed Mobility Training  Rolling: Modified independent  Supine to Sit: Modified independent  Sit to Supine: Modified independent  Balance  Sitting: Intact  Standing: With support  Standing - Static: Fair (+)  Standing - Dynamic : Fair (+)  Transfer Training  Transfer Training  Sit to Stand: Modified independent  Stand to Sit: Modified independent  Sit to Stand: Modified independent  Gait Training  Gait  Base of Support: Center of gravity altered  Speed/Juanita: Pace decreased (<100 feet/min)  Step Length: Left shortened;Right shortened  Gait Abnormalities: Decreased step clearance  Ambulation - Level of Assistance: Supervision  Distance (ft): 100 Feet (ft) (x2)  Assistive Device: Gait belt;Walker, rolling;Walker, rollator  Rail Use: Both  Stairs - Level of Assistance: Stand-by asssistance  Number of Stairs Trained: 5     Gait Abnormalities: Decreased step clearance            With 3 turns. Therapeutic Exercise:    Gait training as mentioned above. Assessed patient's ability to perform gait training using rollator. He required verbal cueing to address locking and unlocking of rollator appropriately, otherwise, ambulated safely. Stair negotiation as mentioned above. TE rendered to address strength and included: heel raises, toe raises, LAQ 20 reps x 2 sets. Patient/Caregiver Education:   Pt /Caregiver Education on safety and fall prevention to reduce fall risk.         ASSESSMENT:  Patient continues to benefit from Skilled PT services to improve strength, endurance, mobility, gait, and balance. Progression toward goals:  [ ]      Improving appropriately and progressing toward goals  [X]      Improving slowly and progressing toward goals  [ ]      Not making progress toward goals and plan of care will be adjusted      Treatment session: 35 minutes.   Therapist:   Fan Delgado, PT,          6/2/2017

## 2017-06-03 PROCEDURE — 74011250637 HC RX REV CODE- 250/637: Performed by: INTERNAL MEDICINE

## 2017-06-03 PROCEDURE — 77030011256 HC DRSG MEPILEX <16IN NO BORD MOLN -A

## 2017-06-03 RX ADMIN — FUROSEMIDE 20 MG: 20 TABLET ORAL at 09:48

## 2017-06-03 RX ADMIN — WARFARIN SODIUM 6 MG: 2 TABLET ORAL at 17:53

## 2017-06-03 RX ADMIN — Medication: at 17:54

## 2017-06-03 RX ADMIN — Medication: at 12:06

## 2017-06-03 RX ADMIN — CARVEDILOL 12.5 MG: 12.5 TABLET, FILM COATED ORAL at 09:48

## 2017-06-03 NOTE — ROUTINE PROCESS
Bedside and Verbal shift change report given to [address] Jeannine (oncoming nurse) by Roldan Forde RN (offgoing nurse). Report included the following information SBAR, Kardex and MAR. Pt visually checked q 1 hour by nursing staff.

## 2017-06-04 PROCEDURE — 74011250637 HC RX REV CODE- 250/637: Performed by: INTERNAL MEDICINE

## 2017-06-04 PROCEDURE — 77030011256 HC DRSG MEPILEX <16IN NO BORD MOLN -A

## 2017-06-04 RX ADMIN — WARFARIN SODIUM 6 MG: 2 TABLET ORAL at 17:51

## 2017-06-04 RX ADMIN — Medication: at 18:00

## 2017-06-04 RX ADMIN — CARVEDILOL 12.5 MG: 12.5 TABLET, FILM COATED ORAL at 17:51

## 2017-06-04 RX ADMIN — CARVEDILOL 12.5 MG: 12.5 TABLET, FILM COATED ORAL at 08:00

## 2017-06-04 RX ADMIN — FUROSEMIDE 20 MG: 20 TABLET ORAL at 09:00

## 2017-06-04 NOTE — PROGRESS NOTES
Problem: Mobility Impaired (Adult and Pediatric)  Goal: *Acute Goals and Plan of Care (Insert Text)  PHYSICAL THERAPY STG GOALS :  Initiated 5/25/2017 and to be accomplished within 2 Weeks (updated 5/31/17)    1. Patient will move from supine to sit and sit to supine and roll side to side in bed with contact guard assist. (Achieved)   2. Patient will transfer from bed to chair and chair to bed with stand by assist using RW. (Achieved)  3. Patient will perform sit to stand with stand by assist with Fair+ balance and safety awareness. (Achieved)  4. Patient will ambulate with stand by assist for 75 feet with RW on level surfaces with 2 turns. (Achieved)  5. Patient will ascend/descend 4 stairs with right handrail(s) withminimal assistance/contact guard assist to allow for safe home access/exit. (Achieved)  6. Patient will improve standardized test score for [place] Standing Balance scale 3+ for reduced fall risk. (Progressing; 2+)    PHYSICAL THERAPY LTG GOALS :  Initiated 5/25/2017 and to be accomplished within 4 Weeks    1. Patient will move from supine to sit and sit to supine and roll side to side in bed with modified independence. 2. Patient will transfer from bed to chair and chair to bed with modified independence using RW. 3. Patient will perform sit to stand with modified independence with Good balance and safety awareness. 4. Patient will ambulate with supervision/set-up for 150 feet with RW on level surfaces and be able to maneuver through narrow spaces and obstacles without loss of balance. 5. Patient will ascend/descend 4 stairs with right handrail(s) with stand by assist to allow for safe home access/exit. 6. Patient will improve standardized test score for [place] Standing Balance scale 4 for reduced fall risk.      Physical Therapist: Enma Fox, PT on 5/25/2017    Outcome: Progressing Towards Goal  [facility]   PHYSICAL THERAPY DAILY TREATMENT NOTE        Patient: General Romo Alex Rivera [de-identified]64 y.o. male)               Date: 6/4/2017    Physician: Raymond Monahan MD  Primary Diagnosis: CELLULITIS          Treatment Diagnosis  Treatment Diagnosis: muscle weakness  Treatment Diagnosis 2: difficulty in walking  Precautions: Fall  Vital Signs  Vital Signs  Pulse (Heart Rate): 63  Cardiac Rhythm: Paced  BP: 133/88  Cognitive Status:  Mental Status  Neurologic State: Alert  Orientation Level: Oriented X4  Cognition: Appropriate for age attention/concentration  Pain  Bed Mobility Training  Balance  Sitting: Intact  Standing: With support  Standing - Static: Good  Standing - Dynamic : Fair  Transfer Training  Transfer Training  Sit to Stand: Modified independent  Stand to Sit: Modified independent  Sit to Stand: Modified independent  Gait Training  Gait  Base of Support: Center of gravity altered; Widened  Speed/Juanita: Shuffled; Slow  Step Length: Left shortened;Right shortened  Gait Abnormalities: Decreased step clearance  Ambulation - Level of Assistance: Supervision  Distance (ft): 108 Feet (ft) (x 2 eps)  Assistive Device: Gait belt;Walker, rollator  Gait Abnormalities: Decreased step clearance  Therapeutic Exercise:   Pt continues to demonstrate improvement in overall functional mobility, requiring modified (I) with functional transfers and ambulating 108ft x2 reps with supervision using rollator+VC's for safety and brakes management. Pt completed seated exercises 2.5#'s B LE's: ankle pumps, LAQ's, hip marches, and green TB (hamstring curls) 2x15 reps to increase LE strength/endurance. Patient/Caregiver Education:   Pt /Caregiver Education on safety techniques and proper management of rollator brakes during transfers to increase overall functional (I) upon return to home. Pt verbalized and demonstrated understanding.        ASSESSMENT:  Patient continues to benefit from Skilled PT services to improve strength, endurance, dynamic standing balance, and functional (I) with transfers, gait, and stairs. Progression toward goals:  [X]      Improving appropriately and progressing toward goals  [ ]      Improving slowly and progressing toward goals  [ ]      Not making progress toward goals and plan of care will be adjusted      Treatment session: 55 minutes.   Therapist:   Leanna Oconnor PTA,          6/4/2017

## 2017-06-04 NOTE — PROGRESS NOTES
Bedside shift change report given to Juanita Ocampo, [address] [place] [address] (oncoming nurse) by Dina Lucas Rn (offgoing nurse). Report included the following information SBAR, Kardex, Intake/Output, MAR and Recent Results.

## 2017-06-04 NOTE — ROUTINE PROCESS
Bedside shift change report given to Erasmo Odom (oncoming nurse) by Nadeen Schuster RN (offgoing nurse). Report included the following information SBAR, Kardex, MAR and Med Rec Status. VISUALLY CHECKED PT Q 1 HR BY NURSING STAFF. 24 hour order check done.

## 2017-06-05 LAB
INR PPP: 1.8 (ref 0.8–1.2)
PROTHROMBIN TIME: 20 SEC (ref 11.5–15.2)

## 2017-06-05 PROCEDURE — 85610 PROTHROMBIN TIME: CPT | Performed by: INTERNAL MEDICINE

## 2017-06-05 PROCEDURE — 77030011256 HC DRSG MEPILEX <16IN NO BORD MOLN -A

## 2017-06-05 PROCEDURE — 74011250637 HC RX REV CODE- 250/637: Performed by: INTERNAL MEDICINE

## 2017-06-05 PROCEDURE — 36415 COLL VENOUS BLD VENIPUNCTURE: CPT | Performed by: INTERNAL MEDICINE

## 2017-06-05 RX ADMIN — CARVEDILOL 12.5 MG: 12.5 TABLET, FILM COATED ORAL at 18:19

## 2017-06-05 RX ADMIN — FUROSEMIDE 20 MG: 20 TABLET ORAL at 09:46

## 2017-06-05 RX ADMIN — WARFARIN SODIUM 7 MG: 2.5 TABLET ORAL at 18:19

## 2017-06-05 RX ADMIN — Medication: at 09:50

## 2017-06-05 RX ADMIN — CARVEDILOL 12.5 MG: 12.5 TABLET, FILM COATED ORAL at 09:46

## 2017-06-05 RX ADMIN — Medication: at 20:33

## 2017-06-05 NOTE — ROUTINE PROCESS
Bedside and Verbal shift change report given to [address] Paez (oncoming nurse) by Larisa Wood RN (offgoing nurse). Report included the following information SBAR, Kardex and MAR.

## 2017-06-05 NOTE — PROGRESS NOTES
Problem: Self Care Deficits Care Plan (Adult)  Goal: *Acute Goals and Plan of Care (Insert Text)  OCCUPATIONAL THERAPY SHORT TERM GOALS     Updated 6/01.17    1. Patient will perform Upper body ADLs with/without adaptive equipment with Mod I.  2. Patient will perform Lower body ADLs with/without adaptive equipment with SBA. 3. Patient will perform toileting task with Mod I with Fair+ safety to reduce falls risk. 4. Patient will perform functional transfers with Rolling Walker and Mod I.  5. Patient will perform standing static/dynamic balance activities for improved ADL/IADL function with Supervision and Fair+ balance and safety awarenes. 6. Patient will improve Barthel index scores to atleast 75/100 to improve functional mobility. Initiated 5/26/2017 and to be accomplished within 2 Week(s)    1. Patient will perform Upper body ADLs with/without adaptive equipment with supervision/set-up. (goal met-UPG Mod I)  2. Patient will perform Lower body ADLs with/without adaptive equipment with minimal assistance/contact guard assist . (goal met-UPG SBA)  3. Patient will perform toileting task with minimal assistance/contact guard assist with Fair safety to reduce falls risk. (goal met-UPG Mod I)  4. Patient will perform functional transfers with Rolling Walker and minimal assistance/contact guard assist.(goal met-UPG Mod I)  5. Patient will perform standing static/dynamic balance activities for improved ADL/IADL function with minimal assistance/contact guard assist and Fair balance and safety awarenes. (goal met-UPG Supervision)  6. Patient will improve Barthel index scores to atleast 55/100 to improve functional mobility. (goal met-UPG 75/100)    OCCUPATIONAL THERAPY LONG TERM GOALS   Initiated 5/26/2017 and to be accomplished within 3-4 Week(s)    1. Patient will perform Upper body ADLs with/without adaptive equipment with supervision/set-up.   2. Patient will perform Lower body ADLs with/without adaptive equipment with supervision/set-up . 3. Patient will perform toileting task with supervision/set-up with Good safety to reduce falls risk. 4. Patient will perform functional transfers with Rolling Walker and supervision/set-up and Good balance and safety awareness. 5. Patient will perform standing static/dynamic activity for improved ADL/IADL function with supervision/set-up an and Good balance and safety awareness. 6. Patient will improve Barthel index score to 75/100 to improve independence with mobility. Therapist: Viv Easton 5/26/2017   [address]   OCCUPATIONAL THERAPY DAILY TREATMENT NOTE        Patient: Danie Fabian (25 y.o. male)                            Date: 6/5/2017  Attending Physician: Niles Mann MD  Primary Diagnosis: CELLULITIS    Treatment Diagnosis  Treatment Diagnosis: muscle weakness  Treatment Diagnosis 2: difficulty in walking   Precautions : Precautions at Admission: Fall  Vital Signs:  Vital Signs  Cardiac Rhythm: Paced     Cognitive Status:  Mental Status  Neurologic State: Alert  Orientation Level: Oriented X4  Cognition: Appropriate for age attention/concentration  Pain:        Gross Assessment:     Coordination:     Bed Mobility:     Transfers:  Functional Transfers  Sit to Stand: Modified independent     Balance:  Balance  Sitting: Intact  Standing: With support  Standing - Static: Good  Standing - Dynamic : Fair (+)        Therapeutic Activities:  Functional mobility utilizing RW to/from patient's room and PT gym in order to increase functional activity tolerance needed for ADLS. Patient was able to complete with SBA and no LOB. Static standing with one hand release in order to increase standing balance and tolerance needed for ADLS. Patient was able to tolerate standing with 4:30 prior to requesting to sit. Therapeutic Exercises:  UB strengthening with 4#, 3 sets x 15 reps in order to increase functional activity tolerance needed for ADLS. Minimum rest breaks needed. Patient/Caregiver Education:    Pt. Alaine Duverney Education on self pacing during exercises for optimal energy consveration.         ASSESSMENT:  Patient continues to demonstrate the need for skilled Occupational Therapy services to improve dynamic standing balance with bathing  Progression toward goals:  [X]      Improving appropriately and progressing toward goals  [ ]      Improving slowly and progressing toward goals  [ ]      Not making progress toward goals and plan of care will be adjusted      Treatment session:   68 minutes     Therapist:    PATTI Christy,  6/5/2017

## 2017-06-05 NOTE — PROGRESS NOTES
conducted a Follow up consultation and Spiritual Assessment for Flor Milner, who is a 64 y.o.,male. The  provided the following Interventions:  Continued the relationship of care and support. Listened empathically. Offered prayer and assurance of continued prayer on patients behalf. Chart reviewed. The following outcomes were achieved:  Patient expressed gratitude for pastoral care visit. Assessment:  There are no further spiritual or Druze issues which require Spiritual Care Services interventions at this time. Plan:  Chaplains will continue to follow and will provide pastoral care on an as needed/requested basis.  recommends bedside caregivers page  on duty if patient shows signs of acute spiritual or emotional distress.      [address] [facility]   Staff [address] [facility]   [phone]

## 2017-06-05 NOTE — PROGRESS NOTES
Problem: Mobility Impaired (Adult and Pediatric)  Goal: *Acute Goals and Plan of Care (Insert Text)  PHYSICAL THERAPY STG GOALS :  Initiated 5/25/2017 and to be accomplished within 2 Weeks (updated 5/31/17)    1. Patient will move from supine to sit and sit to supine and roll side to side in bed with contact guard assist. (Achieved)   2. Patient will transfer from bed to chair and chair to bed with stand by assist using RW. (Achieved)  3. Patient will perform sit to stand with stand by assist with Fair+ balance and safety awareness. (Achieved)  4. Patient will ambulate with stand by assist for 75 feet with RW on level surfaces with 2 turns. (Achieved)  5. Patient will ascend/descend 4 stairs with right handrail(s) withminimal assistance/contact guard assist to allow for safe home access/exit. (Achieved)  6. Patient will improve standardized test score for [place] Standing Balance scale 3+ for reduced fall risk. (Progressing; 2+)    PHYSICAL THERAPY LTG GOALS :  Initiated 5/25/2017 and to be accomplished within 4 Weeks    1. Patient will move from supine to sit and sit to supine and roll side to side in bed with modified independence. 2. Patient will transfer from bed to chair and chair to bed with modified independence using RW. 3. Patient will perform sit to stand with modified independence with Good balance and safety awareness. 4. Patient will ambulate with supervision/set-up for 150 feet with RW on level surfaces and be able to maneuver through narrow spaces and obstacles without loss of balance. 5. Patient will ascend/descend 4 stairs with right handrail(s) with stand by assist to allow for safe home access/exit. 6. Patient will improve standardized test score for [place] Standing Balance scale 4 for reduced fall risk.      Physical Therapist: Paola Smith, PT on 5/25/2017    [address] [facility]   PHYSICAL THERAPY DAILY TREATMENT NOTE        Patient: So Fan (74 y.o. male) Date: 6/5/2017    Physician: Doug Melendez MD  Primary Diagnosis: CELLULITIS          Treatment Diagnosis  Treatment Diagnosis: muscle weakness  Treatment Diagnosis 2: difficulty in walking  Precautions: Fall  Vital Signs  Vital Signs  Pulse (Heart Rate): 62  Cardiac Rhythm: Paced  BP: 117/70  MAP (Calculated): 86  BP 1 Method: Automatic  BP 1 Location: Right arm  BP Patient Position: Post activity; Sitting     Cognitive Status:  Mental Status  Neurologic State: Alert  Orientation Level: Oriented X4  Cognition: Follows commands; Appropriate for age attention/concentration  Pain     Bed Mobility Training  Bed Mobility Training  Rolling: Modified independent  Supine to Sit: Modified independent  Balance  Sitting: Intact  Standing: With support  Standing - Static: Fair (+)  Standing - Dynamic : Fair (+)  Transfer Training  Transfer Training  Sit to Stand: Modified independent  Stand to Sit: Modified independent  Sit to Stand: Modified independent  Gait Training  Gait  Base of Support: Center of gravity altered  Speed/Juanita: Pace decreased (<100 feet/min)  Step Length: Left shortened;Right shortened  Gait Abnormalities: Decreased step clearance  Ambulation - Level of Assistance: Supervision  Distance (ft): 108 Feet (ft) (x2)  Assistive Device: Gait belt;Walker, rolling     Gait Abnormalities: Decreased step clearance            With 4 turns. Therapeutic Exercise:    Gait training as mentioned above. TE rendered to address strength, endurance, and mobility and included: standing marching, standing toe raises, standing heel raises 20 reps x 2 sets. Transfer training x 10 reps to improve carry-over with safe and proper hand placement. Pt required rest breaks, reported dizziness, vitals assessed and documented in flow sheet. Patient/Caregiver Education:   Pt /Caregiver Education on safety and fall prevention to reduce fall risk.        ASSESSMENT:  Patient continues to benefit from Skilled PT services to improve strength, endurance, gait, balance. Progression toward goals:  [ ]      Improving appropriately and progressing toward goals  [X]      Improving slowly and progressing toward goals  [ ]      Not making progress toward goals and plan of care will be adjusted      Treatment session: 35 minutes.   Therapist:   Shannon Doty, PT,          6/5/2017

## 2017-06-06 PROCEDURE — 74011250637 HC RX REV CODE- 250/637: Performed by: INTERNAL MEDICINE

## 2017-06-06 RX ADMIN — FUROSEMIDE 20 MG: 20 TABLET ORAL at 10:40

## 2017-06-06 RX ADMIN — WARFARIN SODIUM 7 MG: 2.5 TABLET ORAL at 18:22

## 2017-06-06 RX ADMIN — CARVEDILOL 12.5 MG: 12.5 TABLET, FILM COATED ORAL at 18:22

## 2017-06-06 RX ADMIN — Medication: at 18:24

## 2017-06-06 RX ADMIN — CARVEDILOL 12.5 MG: 12.5 TABLET, FILM COATED ORAL at 10:40

## 2017-06-06 RX ADMIN — Medication: at 10:41

## 2017-06-06 NOTE — PROGRESS NOTES
LYN faxed clinical update to [facility] to request an extension of pt's snf stay. LYN informed by therapy of d/c date of 6/9/17 if pt is extended by [facility].

## 2017-06-06 NOTE — PROGRESS NOTES
Problem: Mobility Impaired (Adult and Pediatric)  Goal: *Acute Goals and Plan of Care (Insert Text)  PHYSICAL THERAPY STG GOALS :  Initiated 5/25/2017 and to be accomplished within 2 Weeks (updated 5/31/17)    1. Patient will move from supine to sit and sit to supine and roll side to side in bed with contact guard assist. (Achieved)   2. Patient will transfer from bed to chair and chair to bed with stand by assist using RW. (Achieved)  3. Patient will perform sit to stand with stand by assist with Fair+ balance and safety awareness. (Achieved)  4. Patient will ambulate with stand by assist for 75 feet with RW on level surfaces with 2 turns. (Achieved)  5. Patient will ascend/descend 4 stairs with right handrail(s) withminimal assistance/contact guard assist to allow for safe home access/exit. (Achieved)  6. Patient will improve standardized test score for [place] Standing Balance scale 3+ for reduced fall risk. (Progressing; 2+)    PHYSICAL THERAPY LTG GOALS :  Initiated 5/25/2017 and to be accomplished within 4 Weeks    1. Patient will move from supine to sit and sit to supine and roll side to side in bed with modified independence. 2. Patient will transfer from bed to chair and chair to bed with modified independence using RW. 3. Patient will perform sit to stand with modified independence with Good balance and safety awareness. 4. Patient will ambulate with supervision/set-up for 150 feet with RW on level surfaces and be able to maneuver through narrow spaces and obstacles without loss of balance. 5. Patient will ascend/descend 4 stairs with right handrail(s) with stand by assist to allow for safe home access/exit. 6. Patient will improve standardized test score for [place] Standing Balance scale 4 for reduced fall risk.      Physical Therapist: Dorian Ervin, PT on 5/25/2017    [address] [facility]   PHYSICAL THERAPY DAILY TREATMENT NOTE        Patient: Neelam Hamlin (5 y.o. male) Date: 2017    Physician: Lemmie Apgar, MD  Primary Diagnosis: CELLULITIS          Treatment Diagnosis  Treatment Diagnosis: muscle weakness  Treatment Diagnosis 2: difficulty in walking  Precautions: Fall  Vital Signs        Cognitive Status:  Mental Status  Neurologic State: Alert  Orientation Level: Oriented X4  Cognition: Appropriate safety awareness  Pain     Bed Mobility Training     Balance  Sitting: Intact  Standing: With support  Standing - Static: Fair (+)  Standing - Dynamic : Fair (+)  Transfer Training  Transfer Training  Sit to Stand: Modified independent  Sit to Stand: Modified independent  Gait Training  Gait  Base of Support: Center of gravity altered  Speed/Juanita: Pace decreased (<100 feet/min)  Step Length: Left shortened;Right shortened  Gait Abnormalities: Decreased step clearance  Ambulation - Level of Assistance: Modified independent  Distance (ft): 108 Feet (ft) (x2)  Assistive Device: Walker, rolling  Rail Use: Both  Stairs - Level of Assistance: Supervision  Number of Stairs Trained: 5  Interventions: Safety awareness training     Gait Abnormalities: Decreased step clearance            With 4 turns. Therapeutic Exercise:    Gait and stair training as mentioned above. Dynamic standing balance with intermittent UE support x ~9 minutes with no swaying or LOB. TE rendered to address strength and endurance and included: standing heel raises, standing toe raises 20 reps x 2 sets. Patient/Caregiver Education:   Pt /Caregiver Education on safety and fall prevention to reduce fall risk. ASSESSMENT:  Patient continues to benefit from Skilled PT services to improve strength, endurance, gait, balance. Progression toward goals:  [ ]      Improving appropriately and progressing toward goals  [X]      Improving slowly and progressing toward goals  [ ]      Not making progress toward goals and plan of care will be adjusted      Treatment session: 40 minutes.   Therapist:   Tim Fritz Bernardo, PT,          6/6/2017

## 2017-06-06 NOTE — PROGRESS NOTES
Problem: Self Care Deficits Care Plan (Adult)  Goal: *Acute Goals and Plan of Care (Insert Text)  OCCUPATIONAL THERAPY SHORT TERM GOALS     Updated 6/01.17    1. Patient will perform Upper body ADLs with/without adaptive equipment with Mod I.  2. Patient will perform Lower body ADLs with/without adaptive equipment with SBA. 3. Patient will perform toileting task with Mod I with Fair+ safety to reduce falls risk. 4. Patient will perform functional transfers with Rolling Walker and Mod I.  5. Patient will perform standing static/dynamic balance activities for improved ADL/IADL function with Supervision and Fair+ balance and safety awarenes. 6. Patient will improve Barthel index scores to atleast 75/100 to improve functional mobility. Initiated 5/26/2017 and to be accomplished within 2 Week(s)    1. Patient will perform Upper body ADLs with/without adaptive equipment with supervision/set-up. (goal met-UPG Mod I)  2. Patient will perform Lower body ADLs with/without adaptive equipment with minimal assistance/contact guard assist . (goal met-UPG SBA)  3. Patient will perform toileting task with minimal assistance/contact guard assist with Fair safety to reduce falls risk. (goal met-UPG Mod I)  4. Patient will perform functional transfers with Rolling Walker and minimal assistance/contact guard assist.(goal met-UPG Mod I)  5. Patient will perform standing static/dynamic balance activities for improved ADL/IADL function with minimal assistance/contact guard assist and Fair balance and safety awarenes. (goal met-U[facility] Supervision)  6. Patient will improve Barthel index scores to atleast 55/100 to improve functional mobility. (goal met-UPG 75/100)    OCCUPATIONAL THERAPY LONG TERM GOALS   Initiated 5/26/2017 and to be accomplished within 3-4 Week(s)    1. Patient will perform Upper body ADLs with/without adaptive equipment with supervision/set-up.   2. Patient will perform Lower body ADLs with/without adaptive equipment with supervision/set-up . 3. Patient will perform toileting task with supervision/set-up with Good safety to reduce falls risk. 4. Patient will perform functional transfers with Rolling Walker and supervision/set-up and Good balance and safety awareness. 5. Patient will perform standing static/dynamic activity for improved ADL/IADL function with supervision/set-up an and Good balance and safety awareness. 6. Patient will improve Barthel index score to 75/100 to improve independence with mobility. Therapist: Arin Sheehan. 5/26/2017   [facility]   OCCUPATIONAL THERAPY DAILY TREATMENT NOTE        Patient: Deidre Grimm (26 y.o. male)                            Date: 6/6/2017  Attending Physician: Bin Swenson MD  Primary Diagnosis: CELLULITIS    Treatment Diagnosis  Treatment Diagnosis: muscle weakness  Treatment Diagnosis 2: difficulty in walking   Precautions : Precautions at Admission: Fall  Vital Signs:        Cognitive Status:  Mental Status  Neurologic State: Alert; Appropriate for age  Orientation Level: Oriented X4  Cognition: Appropriate for age attention/concentration  Pain:        Gross Assessment:     Coordination:     Bed Mobility:     Transfers:  Functional Transfers  Sit to Stand: Modified independent     Balance:  Balance  Sitting: Intact  Standing: With support  Standing - Static: Fair (+)  Standing - Dynamic : Fair (+)        Therapeutic Activities:  Functional mobility utilizing RW to/from patient's room and therapy room in order to increase safety and independence during routine. Static standing activity with two hand release in order to increase standing balance, tolerance as well as challenge balance and stability needed for ADLS. Patient was able to tolerate 4 mins with Supervision prior to requesting to sit. Therapeutic Exercises:  UB strengthening with 4#, 2 sets x 20 reps in order to increase functional activity tolerance needed for ADLS. Minimum rest breaks needed. Patient/Caregiver Education:    Pt. Helena Moran Education on self pacing during standing was provided for optimal safety.         ASSESSMENT:  Patient continues to demonstrate the need for skilled Occupational Therapy services to improve dynamic standing balance needed for bathing  Progression toward goals:  Lavender.Cheeks ]      Improving appropriately and progressing toward goals  [ ]      Improving slowly and progressing toward goals  [ ]      Not making progress toward goals and plan of care will be adjusted      Treatment session:   50 minutes     Therapist:    PATTI Vera,  6/6/2017

## 2017-06-06 NOTE — PROGRESS NOTES
Bedside shift change report given to MARYSOL Garcia LPN (oncoming nurse) by YAYA Fletcher, RN (offgoing nurse). Report included the following information SBAR, Kardex and MAR.

## 2017-06-06 NOTE — ROUTINE PROCESS
Bedside and verbal shift report given to VIJAYA Darling LPN (oncoming nurse) by CLINTON Chairez LPN (offgoing nurse). Report included SBAR, MAR and Kardex. Hourly rounds completed.

## 2017-06-07 PROCEDURE — 74011250637 HC RX REV CODE- 250/637: Performed by: INTERNAL MEDICINE

## 2017-06-07 RX ADMIN — WARFARIN SODIUM 7 MG: 2.5 TABLET ORAL at 17:13

## 2017-06-07 RX ADMIN — CARVEDILOL 12.5 MG: 12.5 TABLET, FILM COATED ORAL at 17:13

## 2017-06-07 RX ADMIN — Medication: at 17:14

## 2017-06-07 RX ADMIN — Medication: at 10:08

## 2017-06-07 RX ADMIN — CARVEDILOL 12.5 MG: 12.5 TABLET, FILM COATED ORAL at 10:08

## 2017-06-07 RX ADMIN — FUROSEMIDE 20 MG: 20 TABLET ORAL at 10:08

## 2017-06-07 NOTE — ROUTINE PROCESS
Verbal and shift report given to VIJAYA Paul LPN (oncoming nurse) by CLINTON Bunn LPN (off going nurse). Report included SBAR, Kardex and MAR.  Hourly rounds completed

## 2017-06-07 NOTE — ROUTINE PROCESS
Bedside and Verbal shift change report given to kinza hernandez lpn (oncoming nurse) by reinaldo Garcia lpn (offgoing nurse). Report included the following information SBAR, Kardex and MAR.  Hourly rounds

## 2017-06-07 NOTE — PROGRESS NOTES
Problem: Self Care Deficits Care Plan (Adult)  Goal: *Acute Goals and Plan of Care (Insert Text)  OCCUPATIONAL THERAPY SHORT TERM GOALS     Updated 6/07/17    1. Patient will perform standing static/dynamic balance activities for improved ADL/IADL function with Mod I and Good balance and safety awarenes. 2. Patient will improve Barthel index scores to atleast 95/100 to improve functional mobility. Updated 6/01/17    1. Patient will perform Upper body ADLs with/without adaptive equipment with Mod I. (goal met-D/C goal )  2. Patient will perform Lower body ADLs with/without adaptive equipment with SBA. (goal met-D/C)  3. Patient will perform toileting task with Mod I with Fair+ safety to reduce falls risk. (goal met-D/C)   4. Patient will perform functional transfers with Rolling Walker and Mod I. (goal met-D/C goal)  5. Patient will perform standing static/dynamic balance activities for improved ADL/IADL function with Supervision and Fair+ balance and safety awarenes. (goal met-UPG Mod I)  6. Patient will improve Barthel index scores to atleast 75/100 to improve functional mobility. (goal met)    Initiated 5/26/2017 and to be accomplished within 2 Week(s)    1. Patient will perform Upper body ADLs with/without adaptive equipment with supervision/set-up. (goal met-UPG Mod I)  2. Patient will perform Lower body ADLs with/without adaptive equipment with minimal assistance/contact guard assist . (goal met-UPG SBA)  3. Patient will perform toileting task with minimal assistance/contact guard assist with Fair safety to reduce falls risk. (goal met-UPG Mod I)  4. Patient will perform functional transfers with Rolling Walker and minimal assistance/contact guard assist.(goal met-UPG Mod I)  5. Patient will perform standing static/dynamic balance activities for improved ADL/IADL function with minimal assistance/contact guard assist and Fair balance and safety awarenes. (goal met-UPG Supervision)  6.  Patient will improve Barthel index scores to atleast 55/100 to improve functional mobility. (goal met-UPG 75/100)    OCCUPATIONAL THERAPY LONG TERM GOALS   Initiated 5/26/2017 and to be accomplished within 3-4 Week(s)    1. Patient will perform Upper body ADLs with/without adaptive equipment with supervision/set-up. 2. Patient will perform Lower body ADLs with/without adaptive equipment with supervision/set-up . 3. Patient will perform toileting task with supervision/set-up with Good safety to reduce falls risk. 4. Patient will perform functional transfers with Rolling Walker and supervision/set-up and Good balance and safety awareness. 5. Patient will perform standing static/dynamic activity for improved ADL/IADL function with supervision/set-up an and Good balance and safety awareness. 6. Patient will improve Barthel index score to 75/100 to improve independence with mobility. Therapist: Arin Zazueta. 5/26/2017   [facility]  OCCUPATIONAL THERAPY WEEKLY SUMMARY   Reporting period:  from 6/01/17through 6/07/17         Patient: Flor Milner (1 y.o. male)                                   Date: 6/7/2017    Primary Diagnosis: CELLULITIS                                      Attending Physician: Edin Talbert MD Treatment Diagnosis  Treatment Diagnosis: muscle weakness  Treatment Diagnosis 2: difficulty in walking  Precautions: Fall  Rehab Potential : Good     Skill interventions and education provided with clinical rationale (include individualized treatment techniques and standardized tests):  Skilled Occupational services were provided utilizing therapeutic exercises, therapeutic activities, functional mobility, functional activities, and EC/WS.      Using a comparative statement, summarize significant progress toward goals as a result of skilled intervention provided:  Patient has made Good progress towards their Occupational Therapy goals in the following areas: increased independence and performance with grooming, bathing, upper body dressing, lower body dressing, toileting and toilet transfer. Patient has met 6/6 STGS and making good progression towards LTGS. Identify remaining functional areas, impairments limiting progress and/or barriers to improvement:  Patient would benefit from continued skilled Occupational Therapy Services to address the following functional deficits in decreased dynamic standing balance and tolerance needed for ADLS/IADLS tasks and transfers. OBJECTIVE DATA SUMMARY:          INITIAL ASSESSMENT WEEKLY PROGRESS   COGNITIVE STATUS: COGNITIVE STATUS:   Neurologic State: Alert, Appropriate for age  Orientation Level: Oriented X4  Cognition: Appropriate for age attention/concentration  Perception: Appears intact  Perseveration: No perseveration noted  Safety/Judgement: Fall prevention Neurologic State: Alert  Orientation Level: Oriented X4  Cognition: Appropriate safety awareness  Perception: Appears intact  Perseveration: No perseveration noted  Safety/Judgement: Fall prevention   PAIN: PAIN:   Pain Scale 1: Numeric (0 - 10)  Pain Intensity 1: 0  Patient Stated Pain Goal: 0  Pain Reassessment 1: Yes       Pain Scale 1: Numeric (0 - 10)  Pain Intensity 1: 0  Patient Stated Pain Goal: 0  Pain Reassessment 1: Yes   BED MOBILITY BED MOBILITY   Rolling:  (not assessed)  Supine to Sit: Stand-by asssistance  Sit to Supine: Stand-by asssistance Rolling: Modified independent  Supine to Sit: Modified independent  Sit to Supine: Modified independent   ADL SELF CARE ADL SELF CARE   Feeding: Independent  Oral Facial Hygiene/Grooming: Setup  Bathing: Moderate assistance  Upper Body Dressing: Minimum assistance  Lower Body Dressing: Moderate assistance  Toileting:  Moderate assistance Bathing Assistance: Modified independent  Position Performed: Standing     Dressing Assistance: Modified independent  Hospital Gown: Modified independent     Bathing Assistance: Modified independent  Perineal  : Modified independent  Position Performed: Standing     Toileting Assistance: Modified independent  Bladder Hygiene:  Moderate assistance  Clothing Management: Moderate assistance     Grooming Assistance: Modified independent (standing sinkside)  Washing Face: Modified independent  Brushing Teeth: Modified independent               TRANSFERS TRANSFERS   Sit to Stand: Contact guard assistance  Stand to Sit: Contact guard assistance  Bed to Chair: Minimum assistance  Toilet Transfer : Minimum assistance (chair to bsc) Sit to Stand: Modified independent  Stand to Sit: Modified independent  Bed to Chair: Stand-by asssistance (close)  Toilet Transfer : Supervision   Bathroom Mobility: Supervision/set up  Toilet Transfer : Minimum assistance (chair to bsc) Bathroom Mobility: Modified independent  Toilet Transfer : Supervision   BALANCE BALANCE   Sitting: Intact  Standing: With support  Standing - Static: Fair  Standing - Dynamic : Fair Sitting: Intact  Standing: With support  Standing - Static: Good  Standing - Dynamic : Fair (+)         GROSS ASSESSMENT  GROSS ASSESSMENT   AROM: Generally decreased, functional  PROM: Generally decreased, functional  Strength: Generally decreased, functional (BLEs grossly 4+/5, except B hips at 3/5)  Coordination: Generally decreased, functional  Tone: Normal  Sensation: Intact AROM: Generally decreased, functional  PROM: Generally decreased, functional  Strength: Generally decreased, functional  Coordination: Generally decreased, functional  Tone: Normal  Sensation: Intact   COORDINATION COORDINATION   Fine Motor Skills-Upper: Left Intact, Right Intact  Gross Motor Skills-Upper: Left Intact, Right Intact Fine Motor Skills-Upper: Left Intact, Right Intact  Gross Motor Skills-Upper: Left Intact, Right Intact   VISUAL/PERCEPTUAL VISUAL/PERCEPTUAL   Tracking: Able to track stimulus in all quadrants w/o difficulty   Tracking: Able to track stimulus in all quadrants w/o difficulty     AUDITORY: AUDITORY:   Auditory Impairment: None Auditory Impairment: Hard of hearing, right side         INSTRUMENTAL  ADL'S:    INSTRUMENTAL ADL'S:            THE BARTHEL INDEX  ACTIVITY    SCORE   FEEDING  0=unable  5=needs help cutting,spreading butter,etc., or modified diet  10= independent    10   BATHING  0=dependent  5=independent (or in shower    5   GROOMING  0=needs help  5=independent face/hair/teeth/shaving (implements provided)    5   DRESSING  0=dependent  5=needs help but can do about half unaided  10=independent(including buttons, zips,laces etc.)    10   BOWELS  0=incontinent  5=occasional accident  10=continent    10   BLADDER  0=incontinent, or catheterized and unable to manage alone  5=occasional accident  10=continent    10   TOILET USE  0=dependent  5=needs some help, but can do something alone  10=independent (on and off, dressing, wiping)    10   TRANSFER (BED TO CHAIR AND BACK)  0=unable, no sitting balance  5=major help(one or two people,physical), can sit  10=minor help(verbal or physical)  15=independent    15   MOBILITY (ON LEVEL SURFACES)  0=immobile or <50 yards  5=wheelchair independent,including corners,>50 yards  10=walkes with help of one person (verbal or physical) >50 yards  15=independent(but may use any aid; for example, stick) >50 yards    15   STAIRS  0=unable  5=needs help (verbal, physical, carrying aid)  10=independent    0             TOTAL:                 90/100      Treatment:  UB strengthening with 5#, 2 sets x 20 reps in order to increase functional activity tolerance needed for ADLS. Provided patient with shower chair and tub bench in order for optimal safety with showering transfers. Patient's response to Treatment rendered:  Patient is pleasant and receptive to therapist cueing and recommendations.      Patient expected Discharge Location:  [X]Private Residence  [ ] [facility]  [ [facility]  [ ]Other:     Plan: Continue OT services as established on the Plan of Care for 1 times a week.      Treatment Minutes:  35  OT and Assistant have had a weekly case conference regarding the above treatment:  [X] Yes     [ ] No    Therapist:   Jolene Reynoso,         Date:6/7/2017      Forward to OT for co-signature when completed

## 2017-06-07 NOTE — PROGRESS NOTES
SW received a fax that pt was extended by [facility] until 6/9/17. SW spoke with pt re: the extension. SW discussed Home Health referral with pt. Pt was active with [facility] prior to admission. Pt signed the [place] of Choice and  was given a copy. SW spoke with pt re: follow-up appt with PCP. Pt stated that he doesn't have a PCP and was being followed by cardiology and wound clinic at [facility]. SW suggested that pt discuss with his daughter need for obtaining a pcp. Pt stated that he would like a [facility] PCP near his daughters apt. SW agree to follow-up with pt before d/c re: PCP. SW spoke with pt's daughter earlier and requested that she contact [facility] [address] : copayment for pt's rollater walker that was ordered. She agreed to follow-up as requested.

## 2017-06-07 NOTE — PROGRESS NOTES
GIM     Patient: So Fan MRN: [id number]  CSN: [id number]    YOB: 1955  Age: 64 y.o. Sex: male    DOA: 5/24/2017 LOS:  LOS: 14 days                    Subjective:     Pt with good controll of venous stasis with tubie . Cellulitis resolved and rep protime pending    Objective:      Visit Vitals    /58    Pulse 68    Temp 98.3 °F (36.8 °C)    Resp 18    Ht 5' 11\" (1.803 m)    Wt 142.1 kg (313 lb 3.2 oz)    SpO2 96%    BMI 43.68 kg/m2       Physical Exam:  Chest clear  Cor rr  abd soft  No edema    Intake and Output:  Current Shift:     Last three shifts:       No results found for this or any previous visit (from the past 24 hour(s)). Current Facility-Administered Medications   Medication Dose Route Frequency    warfarin (COUMADIN) tablet 7 mg  7 mg Oral QPM    furosemide (LASIX) tablet 20 mg  20 mg Oral DAILY    [facility] ANESTHESIA   Other PRN    [facility] EMERGENCY/STAT BOX   Other PRN    carvedilol (COREG) tablet 12.5 mg  12.5 mg Oral BID WITH MEALS    nitroglycerin (NITROSTAT) tablet 0.4 mg  0.4 mg SubLINGual Q5MIN PRN    acetaminophen (TYLENOL) tablet 650 mg  650 mg Oral Q6H PRN    traMADol (ULTRAM) tablet 50 mg  50 mg Oral Q6H PRN    magnesium hydroxide (MILK OF MAGNESIA) 400 mg/5 mL oral suspension 30 mL  30 mL Oral DAILY PRN    bisacodyl (DULCOLAX) suppository 10 mg  10 mg Rectal DAILY PRN    sodium phosphate (FLEET'S) enema 118 mL  1 Enema Rectal DAILY PRN    ammonium lactate (LAC-HYDRIN) 12 % lotion   Topical BID       Lab Results   Component Value Date/Time    Glucose 84 05/31/2017 05:45 AM    Glucose 80 05/29/2017 05:28 AM    Glucose 77 05/23/2017 02:59 PM    Glucose 73 05/22/2017 04:50 AM    Glucose 64 05/21/2017 04:00 AM        Assessment/Plan     Active Problems:    * No active hospital problems.  Osvaldo Rodriguez MD  6/7/2017, 10:05 AM

## 2017-06-07 NOTE — PROGRESS NOTES
Problem: Mobility Impaired (Adult and Pediatric)  Goal: *Acute Goals and Plan of Care (Insert Text)  PHYSICAL THERAPY LTG GOALS :  Initiated 5/25/2017 and to be accomplished within 4 Weeks (updated 6/7/17)    1. Patient will move from supine to sit and sit to supine and roll side to side in bed with modified independence. (Achieved)   2. Patient will transfer from bed to chair and chair to bed with modified independence using RW. (Achieved using rollator)  3. Patient will perform sit to stand with modified independence with Good balance and safety awareness. (Achieved)  4. Patient will ambulate with supervision/set-up for 150 feet with RW on level surfaces and be able to maneuver through narrow spaces and obstacles without loss of balance. (Achieved using rollator)  5. Patient will ascend/descend 4 stairs with right handrail(s) with stand by assist to allow for safe home access/exit. (Achieved)  6. Patient will improve standardized test score for [place] Standing Balance scale 4 for reduced fall risk. (Achieved)    PHYSICAL THERAPY STG GOALS :  Initiated 5/25/2017 and to be accomplished within 2 Weeks (updated 5/31/17)    1. Patient will move from supine to sit and sit to supine and roll side to side in bed with contact guard assist. (Achieved)   2. Patient will transfer from bed to chair and chair to bed with stand by assist using RW. (Achieved)  3. Patient will perform sit to stand with stand by assist with Fair+ balance and safety awareness. (Achieved)  4. Patient will ambulate with stand by assist for 75 feet with RW on level surfaces with 2 turns. (Achieved)  5. Patient will ascend/descend 4 stairs with right handrail(s) withminimal assistance/contact guard assist to allow for safe home access/exit. (Achieved)  6. Patient will improve standardized test score for [place] Standing Balance scale 3+ for reduced fall risk.  (Progressing; 2+)    PHYSICAL THERAPY LTG GOALS :  Initiated 5/25/2017 and to be accomplished within 4 Weeks    1. Patient will move from supine to sit and sit to supine and roll side to side in bed with modified independence. 2. Patient will transfer from bed to chair and chair to bed with modified independence using RW. 3. Patient will perform sit to stand with modified independence with Good balance and safety awareness. 4. Patient will ambulate with supervision/set-up for 150 feet with RW on level surfaces and be able to maneuver through narrow spaces and obstacles without loss of balance. 5. Patient will ascend/descend 4 stairs with right handrail(s) with stand by assist to allow for safe home access/exit. 6. Patient will improve standardized test score for [place] Standing Balance scale 4 for reduced fall risk. Physical Therapist: Justice Marlow PT on 5/25/2017    [facility]   PHYSICAL THERAPY WEEKLY PROGRESS REPORT  Reporting Period:  Date:   5/31/17  to 6/7/17        Patient: Alberto Pineda (12 y.o. male)                         Date: 6/7/2017    Primary Diagnosis: CELLULITIS                          Attending Physician: Luis London MD   Treatment Diagnosis  Treatment Diagnosis: muscle weakness  Treatment Diagnosis 2: difficulty in walking  Precautions:  Fall  Rehab Potential : Excellent:     Skill interventions and education provided with clinical rationale (include individualized treatment techniques and standardized tests):   Skilled Physical Therapy services were provided with: TE rendered to address strength, endurance, and mobility. TA rendered to address bed mobility, transfers. Gait training rendered to address gait deficits and reduce fall risk during ambulation as well as to assess ambulation using rollator. Stair training rendered to address safe negotiation of stairs. Neuromuscular re-education rendered to address balance impairments.             Using a comparative statement, summarize significant progress toward goals as a result of skilled intervention provided:  Patient has made Good progress towards their Physical Therapy goals in the areas of bed mobility, transfers, stair negotiation, ambulation using rollator, and balance. Pt has achieved all of established LTGs. Identify remaining functional areas, impairments limiting progress and/or barriers to improvement:  Patient has achieved all of established goals and d/c planned for  6/9/17.        OBJECTIVE DATA SUMMARY:       INITIAL ASSESSMENT WEEKLY ASSESSMENT   COGNITIVE STATUS COGNITIVE STATUS   Neurologic State: Alert, Appropriate for age  Orientation Level: Oriented X4  Cognition: Appropriate for age attention/concentration  Perception: Appears intact  Perseveration: No perseveration noted  Safety/Judgement: Fall prevention Neurologic State: Alert  Orientation Level: Oriented X4  Cognition: Appropriate safety awareness  Perception: Appears intact  Perseveration: No perseveration noted  Safety/Judgement: Fall prevention   PAIN PAIN   Pain Scale 1: Numeric (0 - 10)  Pain Intensity 1: 0  Patient Stated Pain Goal: 0  Pain Reassessment 1: Yes Pain Scale 1: Numeric (0 - 10)  Pain Intensity 1: 0  Patient Stated Pain Goal: 0  Pain Reassessment 1: Yes   GROSS ASSESSMENT GROSS ASSESSMENT   AROM: Generally decreased, functional  PROM: Generally decreased, functional  Strength: Generally decreased, functional (BLEs grossly 4+/5, except B hips at 3/5)  Coordination: Generally decreased, functional  Tone: Normal  Sensation: Intact AROM: Generally decreased, functional  PROM: Generally decreased, functional  Strength: Generally decreased, functional  Coordination: Generally decreased, functional  Tone: Normal  Sensation: Intact   BED MOBILITY BED MOBILITY   Rolling:  (not assessed)  Supine to Sit: Stand-by asssistance  Sit to Supine: Stand-by asssistance Rolling: Modified independent  Supine to Sit: Modified independent  Sit to Supine: Modified independent   GAIT GAIT   Base of Support: Center of gravity altered  Speed/Juanita: Pace decreased (<100 feet/min)  Step Length: Left shortened, Right shortened  Gait Abnormalities: Decreased step clearance  Ambulation - Level of Assistance: Contact guard assistance  Distance (ft): 20 Feet (ft)  Assistive Device: Walker, rolling  Rail Use: Both  Stairs - Level of Assistance: Contact guard assistance  Number of Stairs Trained: 5  Interventions: Safety awareness training Base of Support: Center of gravity altered  Speed/Juanita: Pace decreased (<100 feet/min)  Step Length: Left shortened, Right shortened  Gait Abnormalities: Decreased step clearance  Ambulation - Level of Assistance: Modified independent  Distance (ft): 355 Feet (ft) (115)  Assistive Device: Walker, rollator  Rail Use: Both  Stairs - Level of Assistance: Supervision  Number of Stairs Trained: 5  Interventions: Safety awareness training   Exceptions to WDL Exceptions to WDL               TRANSFERS TRANSFERS   Sit to Stand: Contact guard assistance  Stand to Sit: Contact guard assistance  Bed to Chair: Minimum assistance Sit to Stand: Modified independent  Stand to Sit: Modified independent  Bed to Chair: Stand-by asssistance (close)   BALANCE BALANCE   Sitting: Intact  Standing: With support  Standing - Static: Fair  Standing - Dynamic : Fair Sitting: Intact  Standing: With support  Standing - Static: Good  Standing - Dynamic : Fair (+)   WHEELCHAIR MOBILITY/MGMT WHEELCHAIR MOBILITY/MGMT         Activity Tolerance:  Good Activity Tolerance: Good   Visual/Perceptual   Tracking: Able to track stimulus in all quadrants w/o difficulty        Visual/Perceptual   Vision  Tracking: Able to track stimulus in all quadrants w/o difficulty         Auditory:   Auditory Impairment: None      Auditory:   Auditory  Auditory Impairment: Hard of hearing, right side         Steps: both   Clinical Decision makin+ Clinical Decision makin      Treatment:   Gait training x 355, 115 ft using rollator mod (I), pt requires minimal verbal cueing for appropriate locking and unlocking of rollator. Obstacle negotiation rendered to address to improve functional reaching using rollator as well as negotiation of more narrow spaces, pt required no verbal cueing for safety. TE rendered to address strength and endurance and included: LAQ 20 reps x 3 sets. Dynamic standing balance x 5 min 20 sec with no UE support and no swaying. Patient's response to treatment rendered:  Good     Patient expected Discharge Location:  [X]Private Residence  [ ] [facility]  [ Doc [place]  [ ]Other:     Plan: Continue Skilled PT services as established by the Plan of Care for 5-6 times a week. PT and Assistant have had a weekly case conference regarding the above treatment:  [X] Yes     [ ] No        Treatment session:  35 minutes. Therapist: Channing Grimaldo, PT       Date:6/7/2017  Forward to PT for co-signature when completed.

## 2017-06-08 LAB
INR PPP: 2.6 (ref 0.8–1.2)
PROTHROMBIN TIME: 26.5 SEC (ref 11.5–15.2)

## 2017-06-08 PROCEDURE — 85610 PROTHROMBIN TIME: CPT | Performed by: INTERNAL MEDICINE

## 2017-06-08 PROCEDURE — 74011250637 HC RX REV CODE- 250/637: Performed by: INTERNAL MEDICINE

## 2017-06-08 PROCEDURE — 36415 COLL VENOUS BLD VENIPUNCTURE: CPT | Performed by: INTERNAL MEDICINE

## 2017-06-08 RX ORDER — WARFARIN 2 MG/1
6 TABLET ORAL EVERY EVENING
Status: DISCONTINUED | OUTPATIENT
Start: 2017-06-08 | End: 2017-06-09 | Stop reason: HOSPADM

## 2017-06-08 RX ADMIN — FUROSEMIDE 20 MG: 20 TABLET ORAL at 09:54

## 2017-06-08 RX ADMIN — WARFARIN SODIUM 6 MG: 2 TABLET ORAL at 20:53

## 2017-06-08 RX ADMIN — Medication: at 09:53

## 2017-06-08 RX ADMIN — CARVEDILOL 12.5 MG: 12.5 TABLET, FILM COATED ORAL at 17:32

## 2017-06-08 RX ADMIN — CARVEDILOL 12.5 MG: 12.5 TABLET, FILM COATED ORAL at 08:00

## 2017-06-08 RX ADMIN — Medication: at 18:00

## 2017-06-08 NOTE — PROGRESS NOTES
Problem: Self Care Deficits Care Plan (Adult)  Goal: *Acute Goals and Plan of Care (Insert Text)  OCCUPATIONAL THERAPY SHORT TERM GOALS     Updated 6/07/17    1. Patient will perform standing static/dynamic balance activities for improved ADL/IADL function with Mod I and Good balance and safety awarenes. (goal met)  2. Patient will improve Barthel index scores to atleast 95/100 to improve functional mobility. (goal met)    Updated 6/01/17    1. Patient will perform Upper body ADLs with/without adaptive equipment with Mod I. (goal met-D/C goal )  2. Patient will perform Lower body ADLs with/without adaptive equipment with SBA. (goal met-D/C)  3. Patient will perform toileting task with Mod I with Fair+ safety to reduce falls risk. (goal met-D/C)   4. Patient will perform functional transfers with Rolling Walker and Mod I. (goal met-D/C goal)  5. Patient will perform standing static/dynamic balance activities for improved ADL/IADL function with Supervision and Fair+ balance and safety awarenes. (goal met-UPG Mod I)  6. Patient will improve Barthel index scores to atleast 75/100 to improve functional mobility. (goal met)    Initiated 5/26/2017 and to be accomplished within 2 Week(s)    1. Patient will perform Upper body ADLs with/without adaptive equipment with supervision/set-up. (goal met-UPG Mod I)  2. Patient will perform Lower body ADLs with/without adaptive equipment with minimal assistance/contact guard assist . (goal met-UPG SBA)  3. Patient will perform toileting task with minimal assistance/contact guard assist with Fair safety to reduce falls risk. (goal met-UPG Mod I)  4. Patient will perform functional transfers with Rolling Walker and minimal assistance/contact guard assist.(goal met-UPG Mod I)  5.  Patient will perform standing static/dynamic balance activities for improved ADL/IADL function with minimal assistance/contact guard assist and Fair balance and safety awarenes. (goal met-UPG Supervision)  6. Patient will improve Barthel index scores to atleast 55/100 to improve functional mobility. (goal met-UPG 75/100)    OCCUPATIONAL THERAPY LONG TERM GOALS   Initiated 2017 and to be accomplished within 3-4 Week(s)    1. Patient will perform Upper body ADLs with/without adaptive equipment with supervision/set-up. 2. Patient will perform Lower body ADLs with/without adaptive equipment with supervision/set-up . 3. Patient will perform toileting task with supervision/set-up with Good safety to reduce falls risk. 4. Patient will perform functional transfers with Rolling Walker and supervision/set-up and Good balance and safety awareness. 5. Patient will perform standing static/dynamic activity for improved ADL/IADL function with supervision/set-up an and Good balance and safety awareness. 6. Patient will improve Barthel index score to 75/100 to improve independence with mobility. Therapist: Arin Walter. 2017   [facility]  OCCUPATIONAL THERAPY DISCHARGE SUMMARY        Patient: Guillaume Frank (52 y.o. male)                       Date: 2017                     Attending Physician: Garth Bolton MD  Primary Diagnosis: CELLULITIS       Treatment Diagnosis  Treatment Diagnosis: muscle weakness  Treatment Diagnosis 2: difficulty in walking         Precautions: Fall     Medical History:   Past Medical History:   Diagnosis Date    CAD (coronary artery disease)      Heart failure ([facility] [address]      Hypertension       Past Surgical History:   Procedure Laterality Date    HX PACEMAKER            Reason for Discharge: [ Enrique Walsh   [ ]Met highest potential  [ ]Hospitalized   [ ]  Progress ceased  [ ]   [ Isael Benoit: Patient/family requesting D/C from facility. Discharge Location:  [ Igor Craft  [ ] [facility] [ Shilpa Leroy  [ ] [address] [ ] 24 hr.  Supervision for safety      Summarize skilled services provided and significant progress attained since last daily/weekly note (include individualized treatment techniques and standardized tests):   Patient has received skilled OT services from 5/26/17 to 6/08/17 and has made Good progress towards their OT goals. Improvements noted in: increased independence and performance with grooming, bathing, upper body dressing, lower body dressing, toileting and toilet transfer     Summary of education/recommendation provided to Patient/Caregivers in the following areas: Remove barriers/rugs, mobility w/Rolling Walker for grooming, bathing, upper body dressing, lower body dressing, toileting, toilet transfer and shower transfer          Objective Data:        INITIAL ASSESSMENT DISCHARGE ASSESSMENT   COGNITIVE STATUS: COGNITIVE STATUS:   Neurologic State: Alert, Appropriate for age  Orientation Level: Oriented X4  Cognition: Appropriate for age attention/concentration  Perception: Appears intact  Perseveration: No perseveration noted  Safety/Judgement: Fall prevention Mental Status  Neurologic State: Alert, Appropriate for age  Orientation Level: Oriented X4  Cognition: Appropriate for age attention/concentration  Perception: Appears intact  Perseveration: No perseveration noted  Safety/Judgement: Fall prevention   PAIN: PAIN:   Pain Scale 1: Numeric (0 - 10)  Pain Intensity 1: 0  Patient Stated Pain Goal: 0  Pain Reassessment 1: Yes Pain Screen  Pain Scale 1: Numeric (0 - 10)  Pain Intensity 1: 0  Patient Stated Pain Goal: 0  Pain Reassessment 1: Yes   BED MOBILITY BED MOBILITY   Rolling:  (not assessed)  Supine to Sit: Stand-by asssistance  Sit to Supine: Stand-by asssistance Bed Mobility  Rolling: Modified independent  Supine to Sit: Modified independent  Sit to Supine: Modified independent   ADL SELF CARE ADL SELF CARE   Feeding: Independent  Oral Facial Hygiene/Grooming: Setup  Bathing: Moderate assistance  Upper Body Dressing: Minimum assistance  Lower Body Dressing: Moderate assistance  Toileting:  Moderate assistance Basic ADL  Feeding: Independent  Oral Facial Hygiene/Grooming: Setup  Bathing: Moderate assistance  Upper Body Dressing: Minimum assistance  Lower Body Dressing: Moderate assistance  Toileting: Moderate assistance   ADL INTERVENTION ADL INTERVENTION   Bathing Assistance: Modified independent  Position Performed: Standing Upper Body Bathing  Bathing Assistance: Modified independent  Position Performed: Standing     Upper Body Dressing Assistance  Dressing Assistance: Modified independent  Hospital Gown: Modified independent     Lower Body Bathing  Bathing Assistance: Modified independent  Perineal  : Modified independent  Position Performed: Standing     Toileting  Toileting Assistance: Modified independent  Bladder Hygiene:  Moderate assistance  Clothing Management: Moderate assistance     Grooming  Grooming Assistance: Modified independent (standing sinkside)  Washing Face: Modified independent  Brushing Teeth: Modified independent         TRANSFERS TRANSFERS   Sit to Stand: Contact guard assistance  Stand to Sit: Contact guard assistance  Bed to Chair: Minimum assistance  Toilet Transfer : Minimum assistance (chair to bsc) Functional Transfers  Sit to Stand: Modified independent  Stand to Sit: Modified independent  Bed to Chair: Stand-by asssistance (close)  Toilet Transfer : Supervision   BALANCE BALANCE   Sitting: Intact  Standing: With support  Standing - Static: Fair  Standing - Dynamic : Fair Balance  Sitting: Intact  Standing: With support  Standing - Static: Good  Standing - Dynamic : Fair (+)   GROSS ASSESSMENT  GROSS ASSESSMENT   AROM: Generally decreased, functional  PROM: Generally decreased, functional  Strength: Generally decreased, functional (BLEs grossly 4+/5, except B hips at 3/5)  Coordination: Generally decreased, functional  Tone: Normal  Sensation: Intact Gross Assessment  AROM: Generally decreased, functional  PROM: Generally decreased, functional  Strength: Generally decreased, functional  Coordination: Generally decreased, functional  Tone: Normal  Sensation: Intact   COORDINATION COORDINATION   Fine Motor Skills-Upper: Left Intact, Right Intact  Gross Motor Skills-Upper: Left Intact, Right Intact Coordination  Fine Motor Skills-Upper: Left Intact, Right Intact  Gross Motor Skills-Upper: Left Intact, Right Intact   VISUAL/PERCEPTUAL VISUAL/PERCEPTUAL   Tracking: Able to track stimulus in all quadrants w/o difficulty   Vision  Tracking: Able to track stimulus in all quadrants w/o difficulty     AUDITORY: AUDITORY:   Auditory Impairment: None Auditory  Auditory Impairment: Hard of hearing, right side   I ADL'S:  I ADL'S:            THE BARTHEL INDEX      ACTIVITY    SCORE   FEEDING  0=unable  5=needs help cutting,spreading butter,etc., or modified diet  10= independent    10   BATHING  0=dependent  5=independent (or in shower    5   GROOMING  0=needs help  5=independent face/hair/teeth/shaving (implements provided)    5   DRESSING  0=dependent  5=needs help but can do about half unaided  10=independent(including buttons, zips,laces etc.)    10   BOWELS  0=incontinent  5=occasional accident  10=continent    10   BLADDER  0=incontinent, or catheterized and unable to manage alone  5=occasional accident  10=continent    10   TOILET USE  0=dependent  5=needs some help, but can do something alone  10=independent (on and off, dressing, wiping)    10   TRANSFER (BED TO CHAIR AND BACK)  0=unable, no sitting balance  5=major help(one or two people,physical), can sit  10=minor help(verbal or physical)  15=independent    15   MOBILITY (ON LEVEL SURFACES)  0=immobile or <50 yards  5=wheelchair independent,including corners,>50 yards  10=walkes with help of one person (verbal or physical) >50 yards  15=independent(but may use any aid; for example, stick) >50 yards    15   STAIRS  0=unable  5=needs help (verbal, physical, carrying aid)  10=independent    0               TOTAL:               90/100 Treatment : Functional mobility utilizing RW to/from patient's room and therapy gym in order to increase safety and independence during routine. Provided patient with personal green T-Band and HEP in order to increase UB muscle strength upon discharge home. Discharge Recommendations:  [X] Home Exercise Program                        [ ] Elevated toilet seat/3 in 1 commode      [ ] [facility] Monson      [ Mikael Fleischer                     [ ] Life Line/alert          [ ] Splint/orthotic application/schedule  [ ] Grab Bars: Location   [X] Home Health OT       [X] [facility] session:  35 minutes.      Therapist: Song Napier, [address]      Date:6/8/2017

## 2017-06-08 NOTE — PROGRESS NOTES
Problem: Mobility Impaired (Adult and Pediatric)  Goal: *Acute Goals and Plan of Care (Insert Text)  PHYSICAL THERAPY LTG GOALS :  Initiated 5/25/2017 and to be accomplished within 4 Weeks (updated 6/7/17)    1. Patient will move from supine to sit and sit to supine and roll side to side in bed with modified independence. (Achieved)   2. Patient will transfer from bed to chair and chair to bed with modified independence using RW. (Achieved using rollator)  3. Patient will perform sit to stand with modified independence with Good balance and safety awareness. (Achieved)  4. Patient will ambulate with supervision/set-up for 150 feet with RW on level surfaces and be able to maneuver through narrow spaces and obstacles without loss of balance. (Achieved using rollator)  5. Patient will ascend/descend 4 stairs with right handrail(s) with stand by assist to allow for safe home access/exit. (Achieved)  6. Patient will improve standardized test score for [place] Standing Balance scale 4 for reduced fall risk. (Achieved)    PHYSICAL THERAPY STG GOALS :  Initiated 5/25/2017 and to be accomplished within 2 Weeks (updated 5/31/17)    1. Patient will move from supine to sit and sit to supine and roll side to side in bed with contact guard assist. (Achieved)   2. Patient will transfer from bed to chair and chair to bed with stand by assist using RW. (Achieved)  3. Patient will perform sit to stand with stand by assist with Fair+ balance and safety awareness. (Achieved)  4. Patient will ambulate with stand by assist for 75 feet with RW on level surfaces with 2 turns. (Achieved)  5. Patient will ascend/descend 4 stairs with right handrail(s) withminimal assistance/contact guard assist to allow for safe home access/exit. (Achieved)  6. Patient will improve standardized test score for [place] Standing Balance scale 3+ for reduced fall risk.  (Progressing; 2+)    PHYSICAL THERAPY LTG GOALS :  Initiated 5/25/2017 and to be accomplished within 4 Weeks    1. Patient will move from supine to sit and sit to supine and roll side to side in bed with modified independence. 2. Patient will transfer from bed to chair and chair to bed with modified independence using RW. 3. Patient will perform sit to stand with modified independence with Good balance and safety awareness. 4. Patient will ambulate with supervision/set-up for 150 feet with RW on level surfaces and be able to maneuver through narrow spaces and obstacles without loss of balance. 5. Patient will ascend/descend 4 stairs with right handrail(s) with stand by assist to allow for safe home access/exit. 6. Patient will improve standardized test score for [place] Standing Balance scale 4 for reduced fall risk. Physical Therapist: Paola Smith PT on 2017    [facility] PHYSICAL THERAPY DISCHARGE SUMMARY        Patient: So Fan (87 y.o. male)                  Date: 2017    Attending Physician: Hernan Chang MD  Primary Diagnosis: CELLULITIS                Treatment Diagnosis  Treatment Diagnosis: muscle weakness  Treatment Diagnosis 2: difficulty in walking         Precautions: Fall   MEDICAL HISTORY:   Past Medical History:   Diagnosis Date    CAD (coronary artery disease)      Heart failure ([facility] [address]      Hypertension       Past Surgical History:   Procedure Laterality Date    HX PACEMAKER            Reason for Discharge: [X] Goals Met   [ ]Met highest potential  [ ]    [ ] Progress ceased  [ ]Hospitalized  [ ]Other: Pt/family request for early D/C from facility. Discharge Location:  [X] Private Residence  [ ] [facility]  [ ] LTC  [ ]  [facility] [address] [ ]Other: 24 hr.supervision for safety. Summarize skilled services provided and significant progress attained since last daily/weekly note (include individualized treatment techniques and standardized tests):   This Patient has received skilled PT services from 17  through 17 and has made Good progress towards their PT goals. Improvements noted in transfers, gait and stair negotiation. Summary of education/recommendation provided to Patient/Caregivers:    Pt. Education provided to use of rollator, and assistance with stairs, pt provided with HEP.          Objective Data:       INITIAL  ASSESSMENT DISCHARGE ASSESSMENT   COGNITIVE STATUS COGNITIVE STATUS   Neurologic State: Alert, Appropriate for age  Orientation Level: Oriented X4  Cognition: Appropriate for age attention/concentration  Perception: Appears intact  Perseveration: No perseveration noted  Safety/Judgement: Fall prevention Neurologic State: Alert, Appropriate for age  Orientation Level: Oriented X4  Cognition: Appropriate for age attention/concentration  Perception: Appears intact  Perseveration: No perseveration noted  Safety/Judgement: Fall prevention   PAIN PAIN   Pain Scale 1: Numeric (0 - 10)  Pain Intensity 1: 0  Patient Stated Pain Goal: 0  Pain Reassessment 1: Yes Pain Scale 1: Numeric (0 - 10)  Pain Intensity 1: 0  Patient Stated Pain Goal: 0  Pain Reassessment 1: Yes   GROSS ASSESSMENT GROSS ASSESSMENT   AROM: Generally decreased, functional  PROM: Generally decreased, functional  Strength: Generally decreased, functional (BLEs grossly 4+/5, except B hips at 3/5)  Coordination: Generally decreased, functional  Tone: Normal  Sensation: Intact AROM: Generally decreased, functional  PROM: Generally decreased, functional  Strength: Generally decreased, functional  Coordination: Generally decreased, functional  Tone: Normal  Sensation: Intact   BED MOBILITY BED MOBILITY   Rolling:  (not assessed)  Supine to Sit: Stand-by asssistance  Sit to Supine: Stand-by asssistance Rolling: Modified independent  Supine to Sit: Modified independent  Sit to Supine: Modified independent   GAIT GAIT   Base of Support: Center of gravity altered  Speed/Juanita: Pace decreased (<100 feet/min)  Step Length: Left shortened, Right shortened  Gait Abnormalities: Decreased step clearance  Ambulation - Level of Assistance: Contact guard assistance  Distance (ft): 20 Feet (ft)  Assistive Device: Walker, rolling  Rail Use: Both  Stairs - Level of Assistance: Contact guard assistance  Number of Stairs Trained: 5  Interventions: Safety awareness training Base of Support: Center of gravity altered  Speed/Juanita: Pace decreased (<100 feet/min)  Step Length: Left shortened, Right shortened  Gait Abnormalities: Decreased step clearance  Ambulation - Level of Assistance: Modified independent  Distance (ft): 355 Feet (ft) (115)  Assistive Device: Walker, rollator  Rail Use: Both  Stairs - Level of Assistance: Supervision  Number of Stairs Trained: 5  Interventions: Safety awareness training   Exceptions to WDL Exceptions to WDL                 With 5 turns. TRANSFERS TRANSFERS   Sit to Stand: Contact guard assistance  Stand to Sit: Contact guard assistance  Bed to Chair: Minimum assistance Sit to Stand: Modified independent  Stand to Sit: Modified independent  Bed to Chair: Stand-by asssistance (close)   BALANCE BALANCE   Sitting: Intact  Standing: With support  Standing - Static: Fair  Standing - Dynamic : Fair Sitting: Intact  Standing: With support  Standing - Static: Good  Standing - Dynamic : Fair (+)   WHEELCHAIR MOBILITY/MGMT WHEELCHAIR MOBILITY/MGMT               Visual/Perceptual       Tracking: Able to track stimulus in all quadrants w/o difficulty    Auditory:   Auditory  Auditory Impairment: Hard of hearing, right side             Visual/Perceptual       Tracking: Able to track stimulus in all quadrants w/o difficulty    Auditory:   Auditory  Auditory Impairment: Hard of hearing, right side             Activity Tolerance:  Good                              Treatment:    Pt ambulated ~100ft x2 with Mod (I) and use of RW. Pt reported getting a rollator for home use.  Therapist advised to have someone present when negotiating steps due to safety concerns. Pt provided with HEP for B LE's. Discharge Recommendations:  [X]  Home Exercise Program                                   [ ]  Home Health PT   [X]  Remove throw rugs/clear environmental barriers                  [X]  Assistance with: SBA                 [X]  Ambulation Device: rollator   [X]  Steps with right & SBA  [ ]  Wheelchair   [ ]  Life Line/alert   [ ]  WC  Ramp        Treatment minutes: 15 minutes.      Therapist :  Rob Ash PTA            Date:6/8/2017

## 2017-06-08 NOTE — PROGRESS NOTES
Nutrition follow up-[facility]/  Plan of care      RECOMMENDATIONS:     1. Cardiac diet  2. Monitor weight and PO intake  3. RD to follow     GOALS:     1. Met/Ongoing: PO intake meets >75% of protein/calorie needs by 6/15  2. Met/Ongoing: Weight Maintenance/Gradual weight loss (1-2 lb by 6/15)    ASSESSMENT:     Weight status is classified as obese per BMI of 43.7. PO intake is adequate. Weight stable. New labs n/a on record. Nutrition recommendations listed. RD to follow. Nutrition Diagnoses:   Obesity related to excessive energy intake as evidenced by BMI of 43.7. Nutrition Risk:  [] High  [] Moderate [x]  Low    SUBJECTIVE/OBJECTIVE:      Transferred from  Cardiac to [facility] on 5/24/17. Admitted with cellulitis of abdominal wall and both lower extremities. Patient appears well nourished. Patient with a good appetite. Weight stable. Noted plan for discharge on 6/9.     Information Obtained from:    [x] Chart Review   [x] Patient   [] Family/Caregiver   [] Nurse/Physician   [] Interdisciplinary Meeting/Rounds    Diet: Cardiac diet  Medications: [x] Reviewed    Allergies: [x] Reviewed   Patient Active Problem List   Diagnosis Code    Cellulitis of abdominal wall L03.311    Cellulitis of both lower extremities L03.115, L03.116     Past Medical History:   Diagnosis Date    CAD (coronary artery disease)     Heart failure ([facility] [address]     Hypertension       Labs:    Lab Results   Component Value Date/Time    Sodium 136 05/31/2017 05:45 AM    Potassium 4.1 05/31/2017 05:45 AM    Chloride 100 05/31/2017 05:45 AM    CO2 27 05/31/2017 05:45 AM    Anion gap 9 05/31/2017 05:45 AM    Glucose 84 05/31/2017 05:45 AM    BUN 17 05/31/2017 05:45 AM    Creatinine 1.17 05/31/2017 05:45 AM    Calcium 8.9 05/31/2017 05:45 AM    Magnesium 1.9 05/17/2017 11:48 PM    Albumin 2.4 05/17/2017 11:48 PM     Anthropometrics: BMI (calculated): 43.7  Last 3 Recorded Weights in this Encounter    05/24/17 1632 06/07/17 0847   Weight: 142.3 kg (313 lb 12.8 oz) 142.1 kg (313 lb 3.2 oz)      Ht Readings from Last 1 Encounters:   06/07/17 5' 11\" (1.803 m)     - Weight stable (+/- 1-2 lb)    Nutrition Needs:   Calories: 9771-6089 Kcal   Protein:   114 g      [x] No Cultural, Hinduism or ethnic dietary need identified.     [] Cultural, Hinduism and ethnic food preferences identified and addressed     Wt Status:  [] Normal (18.6 - 24.9) [] Underweight (<18.5) [] Overweight (25 - 29.9) [] Mild Obesity (30 - 34.9)  [] Moderate Obesity (35 - 39.9) [x] Morbid Obesity (40+)     Nutrition Problems Identified:   [] Suboptimal PO intake   [] Food Allergies  [] Difficulty chewing/swallowing/poor dentition  [] Constipation/Diarrhea   [] Nausea/Vomiting   [] None  [x] Other: Excessive energy intake    Plan:   [x] Therapeutic Diet  []  Obtained/adjusted food preferences/tolerances and/or snacks options   []  Supplements added   [] Occupational therapy following for feeding techniques  []  HS snack added   []  Modify diet texture   []  Modify diet for food allergies   []  Assist with menu selection   [x]  Monitor PO intake on meal rounds   [x]  Continue inpatient monitoring and intervention   [x]  Participated in discharge planning/Interdisciplinary rounds/Team meetings   []  Other:     Education Needs:   [] Not appropriate for teaching at this time due to:   [x] Identified and addressed    Nutrition Monitoring and Evaluation:  [x] Continue ongoing monitoring and intervention  [] Other    Kenny Shelley

## 2017-06-09 VITALS
BODY MASS INDEX: 43.85 KG/M2 | DIASTOLIC BLOOD PRESSURE: 63 MMHG | SYSTOLIC BLOOD PRESSURE: 103 MMHG | WEIGHT: 313.2 LBS | OXYGEN SATURATION: 98 % | RESPIRATION RATE: 20 BRPM | HEIGHT: 71 IN | HEART RATE: 65 BPM | TEMPERATURE: 97.8 F

## 2017-06-09 PROCEDURE — 77030018836 HC SOL IRR NACL ICUM -A

## 2017-06-09 PROCEDURE — 74011250637 HC RX REV CODE- 250/637: Performed by: INTERNAL MEDICINE

## 2017-06-09 RX ORDER — WARFARIN 2 MG/1
6 TABLET ORAL EVERY EVENING
Qty: 100 TAB | Refills: 0 | Status: SHIPPED | OUTPATIENT
Start: 2017-06-09 | End: 2018-08-08

## 2017-06-09 RX ORDER — TRAMADOL HYDROCHLORIDE 50 MG/1
50 TABLET ORAL
Qty: 50 TAB | Refills: 0 | Status: SHIPPED | OUTPATIENT
Start: 2017-06-09

## 2017-06-09 RX ORDER — NITROGLYCERIN 0.4 MG/1
0.4 TABLET SUBLINGUAL
Qty: 60 TAB | Refills: 0 | Status: SHIPPED | OUTPATIENT
Start: 2017-06-09

## 2017-06-09 RX ORDER — AMMONIUM LACTATE 12 G/100G
LOTION TOPICAL
Qty: 1 BOTTLE | Refills: 0 | Status: SHIPPED | OUTPATIENT
Start: 2017-06-09

## 2017-06-09 RX ORDER — CARVEDILOL 12.5 MG/1
12.5 TABLET ORAL 2 TIMES DAILY WITH MEALS
Qty: 60 TAB | Refills: 0 | Status: SHIPPED | OUTPATIENT
Start: 2017-06-09

## 2017-06-09 RX ORDER — FUROSEMIDE 20 MG/1
TABLET ORAL
Qty: 60 TAB | Refills: 0 | Status: SHIPPED | OUTPATIENT
Start: 2017-06-09

## 2017-06-09 RX ADMIN — CARVEDILOL 12.5 MG: 12.5 TABLET, FILM COATED ORAL at 09:36

## 2017-06-09 RX ADMIN — FUROSEMIDE 20 MG: 20 TABLET ORAL at 09:36

## 2017-06-09 RX ADMIN — Medication: at 09:37

## 2017-06-09 NOTE — PROGRESS NOTES
conducted a Follow up consultation and Spiritual Assessment for Rosanna Clinton, who is a 64 y.o.,male. The  provided the following Interventions:  Continued the relationship of care and support. Listened empathically. Offered prayer and assurance of continued prayer on patients behalf. Chart reviewed. The following outcomes were achieved:  Patient expressed gratitude for pastoral care visit. Assessment:  There are no further spiritual or [employer]al issues which require Spiritual Care Services interventions at this time. Plan:  Chaplains will continue to follow and will provide pastoral care on an as needed/requested basis.  recommends bedside caregivers page  on duty if patient shows signs of acute spiritual or emotional distress.      [facility]   Staff [facility]   [phone]

## 2017-06-09 NOTE — PROGRESS NOTES
SW faxed home health orders to [facility] for PT/OT/ Aide, SW for home monitoring and skilled nursing for wound care.

## 2017-06-09 NOTE — PROGRESS NOTES
SW left message for Paulo Pina at [address] her that pt has been discharged as planned.

## 2017-06-09 NOTE — PROGRESS NOTES
Late entry for 6/7/17. SW spoke with pt re: PCP. Pt's insurance card has Dr. Chadwick Cooper at [facility] listed at pt's pcp. Pt stated that he wasn't aware of this. SW called [facility] and was told that Dr. Chadwick Cooper was no longer at this facility. SW requested that pt call [employer] to obtain a new PCP. SW followed up with pt today re: PCP. Pt stated that he called [facility] and they'll send a booklet with listings for MD's in network in [facility] one he has his daughters apt number. SW informed pt that he'll need a PCP to sign a plan of care for home health. Pt indicated that Dr. Sarkis Birmingham from [facility] wound clinic was signing the orders for home health with [address] and that he was going to the Coumadin clinic for his coumadin levels. SW called [facility] and spoke with Lenore re: referral for home health. LYN informed that home health from MD in the hospital are goof for 30 days but pt needs to have a PCP. They were getting wound care orders from Dr. Luma Casey but he won't sign any orders for PT/OT or bathe aide. LYN let message for the coumadin clinic to call SW re: apt for for pt to come for follow-up. LYN informed that pt can't have PT/INR at home since they have no MD to call follow for coumadin levels.

## 2017-06-09 NOTE — DISCHARGE INSTRUCTIONS
DISCHARGE SUMMARY from Nurse    The following personal items are in your possession at time of discharge:    Dental Appliances: None  Visual Aid: None        Jewelry: None  Clothing: Footwear, Pants, Shirt, Socks  Other Valuables: Alexey Sorto             PATIENT INSTRUCTIONS:    After general anesthesia or intravenous sedation, for 24 hours or while taking prescription Narcotics:  · Limit your activities  · Do not drive and operate hazardous machinery  · Do not make important personal or business decisions  · Do  not drink alcoholic beverages  · If you have not urinated within 8 hours after discharge, please contact your surgeon on call. Report the following to your surgeon:  · Excessive pain, swelling, redness or odor of or around the surgical area  · Temperature over 100.5  · Nausea and vomiting lasting longer than 4 hours or if unable to take medications  · Any signs of decreased circulation or nerve impairment to extremity: change in color, persistent  numbness, tingling, coldness or increase pain  · Any questions        What to do at Home:  Recommended activity: P.T./O.T. to follow up     If you experience any of the following symptoms chest pain or shortness of breath, please follow up with Primary Care Physician      *  Please give a list of your current medications to your Primary Care Provider. *  Please update this list whenever your medications are discontinued, doses are      changed, or new medications (including over-the-counter products) are added. *  Please carry medication information at all times in case of emergency situations. These are general instructions for a healthy lifestyle:    No smoking/ No tobacco products/ Avoid exposure to second hand smoke    Surgeon General's Warning:  Quitting smoking now greatly reduces serious risk to your health.     Obesity, smoking, and sedentary lifestyle greatly increases your risk for illness    A healthy diet, regular physical exercise & weight monitoring are important for maintaining a healthy lifestyle    You may be retaining fluid if you have a history of heart failure or if you experience any of the following symptoms:  Weight gain of 3 pounds or more overnight or 5 pounds in a week, increased swelling in our hands or feet or shortness of breath while lying flat in bed. Please call your doctor as soon as you notice any of these symptoms; do not wait until your next office visit. Recognize signs and symptoms of STROKE:    F-face looks uneven    A-arms unable to move or move unevenly    S-speech slurred or non-existent    T-time-call 911 as soon as signs and symptoms begin-DO NOT go       Back to bed or wait to see if you get better-TIME IS BRAIN. Warning Signs of HEART ATTACK     Call 911 if you have these symptoms:   Chest discomfort. Most heart attacks involve discomfort in the center of the chest that lasts more than a few minutes, or that goes away and comes back. It can feel like uncomfortable pressure, squeezing, fullness, or pain.  Discomfort in other areas of the upper body. Symptoms can include pain or discomfort in one or both arms, the back, neck, jaw, or stomach.  Shortness of breath with or without chest discomfort.  Other signs may include breaking out in a cold sweat, nausea, or lightheadedness. Don't wait more than five minutes to call 911 - MINUTES MATTER! Fast action can save your life. Calling 911 is almost always the fastest way to get lifesaving treatment. Emergency Medical Services staff can begin treatment when they arrive -- up to an hour sooner than if someone gets to the hospital by car. The discharge information has been reviewed with the {PATIENT). The {PATIENT) verbalized understanding. Discharge medications reviewed with the {Dishcarge meds viewed with patient and appropriate educational materials and side effects teaching were provided.

## 2017-06-09 NOTE — ROUTINE PROCESS
Reviewed discharge instructions with patient. All questions answered. Voices understanding. Patient's personal medication from Pharmacy was given to him. Patient is getting dressed and packing up his belongings. States his daughter should be here shortly.

## 2017-06-09 NOTE — ROUTINE PROCESS
Pt discharged to home via w/c accompanied by CNA and daughter, pt discharged with belongings and discharge instructions

## 2017-06-09 NOTE — DISCHARGE SUMMARY
[address] SUMMARY    Name:  Kim Nunez  MR#:  [id number]  :  1955  Account #:  [de-identified]  Date of Adm:  2017  Date of Discharge:      FINAL DIAGNOSES: Include  1. Bilateral leg cellulitis. 2. Chronic venous stasis with skin changes. 3. History of coronary artery disease. 4. Congestive heart failure. 5. Sick sinus syndrome with pacemaker placement. 6. Atrial fibrillation for which he is on Coumadin. HISTORY: He was admitted to the hospital because of recurrent  episodes of lightheadedness and was found to have bilateral lower leg  cellulitis. He was treated with IV antibiotics and eventually switched to  Bactrim and transferred to [facility] for rehab prior to returning to his home  environment. He apparently does not have a PCP and is followed by  Cardiology. He has chronic issues with his legs and leg edema and is  being discharged with Tubigrips and local wound care and be followed  by home health as well. DISCHARGE MEDICATIONS: Include  1. Lac-Hydrin to both lower extremities twice a day. 2. Tramadol 1 tablet every 6 hours p.r.n. pain. 3. Lasix 20 mg once a day. 4. Coumadin 6 mg each evening and he is followed in the [address] [place] [address]. 5. Coreg 12.5 mg two times a day. 6. Nitroglycerin 0.4 mg sublingual p.r.n. LABORATORY DATA: Show a discharge INR of 2.6. His hemoglobin is  13 g, white count was 10.3. Electrolytes showed a creatinine of 1.17  and a BUN of 17.         Leatha Hale MD    [facility] / [facility]  D:  2017   10:29  T:  2017   11:09  Job #:  [id number]

## 2017-06-09 NOTE — PROGRESS NOTES
Late entry: LYN spoke with Lanis Sandhoff from [facility] [email or web address] [facility] to inform her that pt is scheduled for d/c today to home. LYN agreed to call back to confirm the d/c once pt is discharged. LYN requested that pt be referred to casemanagement for follow-up at home for compliance with MD appts and needed services in the community.

## 2017-06-09 NOTE — PROGRESS NOTES
Sw called [facility] re: follow-up appt for pt with Dr. Garcia Llamas. SW spoke with Audrey Cunningham and was told that pt would have to come in for a post hospital visit with Megan Bond NP on Tues 6/13/17 before he can return to the coumadin clinic. SW was asked to fax d/c summary and imaging report to the office. SW spoke with pt to inform him of the appt. Pt argumentative with LYN re: need for appt with cardiology. SW tried to explain to pt that it was medically necessary to see cardiolgy after d/c from the hospital pt was informed of the appt time and informed him that it was up to him if wanted to keep the appt. SW had pt to sign the consent to fax documents requested. Pt also informed that SW spoke with [facility] and was informed that they couldn't see him for PT/INR since they has no MD to call the results into. Pt stated that they can be called into the coumadin clinic. LYN explined to pt again the need to see cardiology to help facilitate services for him at home re: his coumadin. Sw faxed information to [facility] as requested.

## 2018-06-30 ENCOUNTER — HOSPITAL ENCOUNTER (EMERGENCY)
Age: 63
Discharge: HOME | End: 2018-06-30
Attending: EMERGENCY MEDICINE
Payer: MEDICARE

## 2018-06-30 VITALS
HEIGHT: 71 IN | RESPIRATION RATE: 16 BRPM | WEIGHT: 250 LBS | BODY MASS INDEX: 35 KG/M2 | SYSTOLIC BLOOD PRESSURE: 111 MMHG | OXYGEN SATURATION: 100 % | DIASTOLIC BLOOD PRESSURE: 79 MMHG | HEART RATE: 79 BPM | TEMPERATURE: 98 F

## 2018-06-30 DIAGNOSIS — R79.1 ELEVATED INR: Primary | ICD-10-CM

## 2018-06-30 DIAGNOSIS — S41.112A SKIN TEAR OF LEFT UPPER ARM WITHOUT COMPLICATION, INITIAL ENCOUNTER: ICD-10-CM

## 2018-06-30 LAB
INR PPP: 1.8 (ref 0.8–1.2)
PROTHROMBIN TIME: 19.9 SEC (ref 11.5–15.2)

## 2018-06-30 PROCEDURE — 85610 PROTHROMBIN TIME: CPT | Performed by: EMERGENCY MEDICINE

## 2018-06-30 PROCEDURE — 99282 EMERGENCY DEPT VISIT SF MDM: CPT

## 2018-06-30 PROCEDURE — 90715 TDAP VACCINE 7 YRS/> IM: CPT | Performed by: EMERGENCY MEDICINE

## 2018-06-30 PROCEDURE — 74011250636 HC RX REV CODE- 250/636: Performed by: EMERGENCY MEDICINE

## 2018-06-30 PROCEDURE — 90471 IMMUNIZATION ADMIN: CPT

## 2018-06-30 RX ADMIN — TETANUS TOXOID, REDUCED DIPHTHERIA TOXOID AND ACELLULAR PERTUSSIS VACCINE, ADSORBED 0.5 ML: 5; 2.5; 8; 8; 2.5 SUSPENSION INTRAMUSCULAR at 10:00

## 2018-06-30 NOTE — ED PROVIDER NOTES
EMERGENCY DEPARTMENT HISTORY AND PHYSICAL EXAM    9:00 AM      Date: 6/30/2018  Patient Name: Destinee Jeter    History of Presenting Illness     Chief Complaint   Patient presents with    Skin Problem         History Provided By: Patient    Chief Complaint: Skin Problem  Duration:  1 hour  Timing:  N/A  Location: Left arm  Quality: N/A  Severity: Mild  Modifying Factors: The patient is currently on coumadin and continues to bleed  Associated Symptoms: denies any other associated signs or symptoms      Additional History (Context): Destinee Jeter is a 58 y.o. male with A-fib and pacemaker who presents with a mild skin tear to the left forearm 1 hour ago as he caught his arm on a door handle. The patient is currently on coumadin. The patient continues to bleed. The patient checks is INR every 2 weeks and is due on Tuesday. He states his INR is usually 2-3. He is unsure if his tetanus is up to date. PCP: PROVIDER UNKNOWN    Current Outpatient Prescriptions   Medication Sig Dispense Refill    carvedilol (COREG) 12.5 mg tablet Take 1 Tab by mouth two (2) times daily (with meals). 60 Tab 0    furosemide (LASIX) 20 mg tablet One pill a day 60 Tab 0    nitroglycerin (NITROSTAT) 0.4 mg SL tablet 1 Tab by SubLINGual route every five (5) minutes as needed for Chest Pain. Indications: ANGINA 60 Tab 0    warfarin (COUMADIN) 2 mg tablet Take 3 Tabs by mouth every evening. 100 Tab 0    ammonium lactate (LAC-HYDRIN) 12 % lotion rub in to affected area well 1 Bottle 0    traMADol (ULTRAM) 50 mg tablet Take 1 Tab by mouth every six (6) hours as needed. Max Daily Amount: 200 mg. 50 Tab 0    oxyCODONE-acetaminophen (PERCOCET) 5-325 mg per tablet Take 1-2 Tabs by mouth every four (4) hours as needed for Pain. Max Daily Amount: 12 Tabs. Indications: Pain 20 Tab 0    trimethoprim-sulfamethoxazole (BACTRIM DS) 160-800 mg per tablet Take 1 Tab by mouth two (2) times a day.  14 Tab 0    Menthol-Zinc Oxide (CALMOSEPTINE) 0.44-20.6 % oint Apply 1 Each to affected area daily. Indications: SKIN IRRITATION         Past History     Past Medical History:  Past Medical History:   Diagnosis Date    CAD (coronary artery disease)     Heart failure ([address]     Hypertension        Past Surgical History:  Past Surgical History:   Procedure Laterality Date    HX PACEMAKER         Family History:  History reviewed. No pertinent family history. Social History:  Social History   Substance Use Topics    Smoking status: Never Smoker    Smokeless tobacco: Never Used    Alcohol use Yes       Allergies:  No Known Allergies      Review of Systems       Review of Systems   Constitutional: Negative for fever. Skin: Positive for wound. All other systems reviewed and are negative. Physical Exam     Visit Vitals    /79 (BP 1 Location: Right arm, BP Patient Position: At rest)    Pulse 79    Temp 98 °F (36.7 °C)    Resp 16    Ht 5' 11\" (1.803 m)    Wt 113.4 kg (250 lb)    SpO2 100%    BMI 34.87 kg/m2         Physical Exam   Constitutional: He is oriented to person, place, and time. He appears well-developed and well-nourished. No distress. Bilateral lower extremities in unna boots. HENT:   Head: Normocephalic and atraumatic. Mouth/Throat: Oropharynx is clear and moist.   Eyes: Conjunctivae and EOM are normal. Pupils are equal, round, and reactive to light. No scleral icterus. Neck: Normal range of motion. Neck supple. Cardiovascular: Normal rate, regular rhythm and normal heart sounds. No murmur heard. Pulmonary/Chest: Effort normal and breath sounds normal. No respiratory distress. Abdominal: Soft. Bowel sounds are normal. He exhibits no distension. There is no tenderness. Musculoskeletal: He exhibits no edema. Lymphadenopathy:     He has no cervical adenopathy. Neurological: He is alert and oriented to person, place, and time. Coordination normal.   Skin: Skin is warm and dry. No rash noted.    Left arm: 3-4cm curvilinear skin tear. No active bleeding. No valeria trauma. Psychiatric: He has a normal mood and affect. His behavior is normal.   Nursing note and vitals reviewed. Diagnostic Study Results     Labs -  Recent Results (from the past 12 hour(s))   PROTHROMBIN TIME + INR    Collection Time: 06/30/18  9:20 AM   Result Value Ref Range    Prothrombin time 19.9 (H) 11.5 - 15.2 sec    INR 1.8 (H) 0.8 - 1.2         Radiologic Studies -   No orders to display         Medical Decision Making   I am the first provider for this patient. I reviewed the vital signs, available nursing notes, past medical history, past surgical history, family history and social history. Vital Signs-Reviewed the patient's vital signs. Pulse Oximetry Analysis -  100% on room air (Interpretation) nl    Cardiac Monitor:  Rate:     Records Reviewed: Nursing Notes (Time of Review: 9:00 AM)    ED Course: Progress Notes, Reevaluation, and Consults:  Wound cleaned and steri strips tdap given     Provider Notes (Medical Decision Making):   MDM  Number of Diagnoses or Management Options  Elevated INR:   Skin tear of right elbow without complication, initial encounter:   Diagnosis management comments: Skin tear on coumadin now bleeding resolved     Inr checked will dc home tdap given        Amount and/or Complexity of Data Reviewed  Clinical lab tests: ordered and reviewed            Diagnosis     Clinical Impression:   1. Skin tear of right elbow without complication, initial encounter    2.  Elevated INR        Disposition: home     Follow-up Information     Follow up With Details Comments Contact Info    [facility] EMERGENCY DEPT  As needed, If symptoms worsen [id number]  [phone]    Your Doctor for  recheck this week               Patient's Medications   Start Taking    No medications on file   Continue Taking    AMMONIUM LACTATE (LAC-HYDRIN) 12 % LOTION    rub in to affected area well    CARVEDILOL (COREG) 12.5 MG TABLET    Take 1 Tab by mouth two (2) times daily (with meals). FUROSEMIDE (LASIX) 20 MG TABLET    One pill a day    MENTHOL-ZINC OXIDE (CALMOSEPTINE) 0.44-20.6 % OINT    Apply 1 Each to affected area daily. Indications: SKIN IRRITATION    NITROGLYCERIN (NITROSTAT) 0.4 MG SL TABLET    1 Tab by SubLINGual route every five (5) minutes as needed for Chest Pain. Indications: ANGINA    OXYCODONE-ACETAMINOPHEN (PERCOCET) 5-325 MG PER TABLET    Take 1-2 Tabs by mouth every four (4) hours as needed for Pain. Max Daily Amount: 12 Tabs. Indications: Pain    TRAMADOL (ULTRAM) 50 MG TABLET    Take 1 Tab by mouth every six (6) hours as needed. Max Daily Amount: 200 mg. TRIMETHOPRIM-SULFAMETHOXAZOLE (BACTRIM DS) 160-800 MG PER TABLET    Take 1 Tab by mouth two (2) times a day. WARFARIN (COUMADIN) 2 MG TABLET    Take 3 Tabs by mouth every evening. These Medications have changed    No medications on file   Stop Taking    No medications on file     _______________________________    Attestations:  Shellie Love acting as a scribe for and in the presence of Faith Lua MD      June 30, 2018 at 10:17 AM       Provider Attestation:      I personally performed the services described in the documentation, reviewed the documentation, as recorded by the scribe in my presence, and it accurately and completely records my words and actions.  June 30, 2018 at 10:17 AM - Faith Lua MD    _______________________________

## 2018-08-07 ENCOUNTER — HOSPITAL ENCOUNTER (EMERGENCY)
Age: 63
Discharge: LEFT BEFORE DISCHARGE | End: 2018-08-07
Attending: EMERGENCY MEDICINE | Admitting: EMERGENCY MEDICINE
Payer: MEDICARE

## 2018-08-07 ENCOUNTER — APPOINTMENT (OUTPATIENT)
Dept: CT IMAGING | Age: 63
End: 2018-08-07
Attending: EMERGENCY MEDICINE
Payer: MEDICARE

## 2018-08-07 VITALS
SYSTOLIC BLOOD PRESSURE: 125 MMHG | DIASTOLIC BLOOD PRESSURE: 76 MMHG | RESPIRATION RATE: 20 BRPM | HEART RATE: 80 BPM | OXYGEN SATURATION: 100 % | TEMPERATURE: 97.7 F

## 2018-08-07 DIAGNOSIS — F10.920 ALCOHOLIC INTOXICATION WITHOUT COMPLICATION (HCC): ICD-10-CM
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
## 2018-08-07 LAB
ALBUMIN SERPL-MCNC: 3 G/DL (ref 3.4–5)
ALBUMIN/GLOB SERPL: 0.8 {RATIO} (ref 0.8–1.7)
ALP SERPL-CCNC: 78 U/L (ref 45–117)
ALT SERPL-CCNC: 18 U/L (ref 16–61)
ANION GAP SERPL CALC-SCNC: 11 MMOL/L (ref 3–18)
AST SERPL-CCNC: 18 U/L (ref 15–37)
BASOPHILS # BLD: 0 K/UL (ref 0–0.1)
BASOPHILS NFR BLD: 0 % (ref 0–2)
BILIRUB SERPL-MCNC: 0.2 MG/DL (ref 0.2–1)
BNP SERPL-MCNC: 333 PG/ML (ref 0–900)
BUN SERPL-MCNC: 12 MG/DL (ref 7–18)
BUN/CREAT SERPL: 15 (ref 12–20)
CALCIUM SERPL-MCNC: 7.5 MG/DL (ref 8.5–10.1)
CHLORIDE SERPL-SCNC: 92 MMOL/L (ref 100–108)
CK MB CFR SERPL CALC: 3.4 % (ref 0–4)
CK MB SERPL-MCNC: 4.1 NG/ML (ref 5–25)
CK SERPL-CCNC: 120 U/L (ref 39–308)
CO2 SERPL-SCNC: 27 MMOL/L (ref 21–32)
CREAT SERPL-MCNC: 0.82 MG/DL (ref 0.6–1.3)
DIFFERENTIAL METHOD BLD: ABNORMAL
EOSINOPHIL # BLD: 0.3 K/UL (ref 0–0.4)
EOSINOPHIL NFR BLD: 3 % (ref 0–5)
ERYTHROCYTE [DISTWIDTH] IN BLOOD BY AUTOMATED COUNT: 12.9 % (ref 11.6–14.5)
ETHANOL SERPL-MCNC: 255 MG/DL (ref 0–3)
GLOBULIN SER CALC-MCNC: 3.9 G/DL (ref 2–4)
GLUCOSE SERPL-MCNC: 92 MG/DL (ref 74–99)
HCT VFR BLD AUTO: 39.4 % (ref 36–48)
HGB BLD-MCNC: 13.9 G/DL (ref 13–16)
INR PPP: 2 (ref 0.8–1.2)
LYMPHOCYTES # BLD: 2.4 K/UL (ref 0.9–3.6)
LYMPHOCYTES NFR BLD: 23 % (ref 21–52)
MCH RBC QN AUTO: 31.2 PG (ref 24–34)
MCHC RBC AUTO-ENTMCNC: 35.3 G/DL (ref 31–37)
MCV RBC AUTO: 88.5 FL (ref 74–97)
MONOCYTES # BLD: 0.6 K/UL (ref 0.05–1.2)
MONOCYTES NFR BLD: 6 % (ref 3–10)
NEUTS SEG # BLD: 7 K/UL (ref 1.8–8)
NEUTS SEG NFR BLD: 68 % (ref 40–73)
PLATELET # BLD AUTO: 501 K/UL (ref 135–420)
PMV BLD AUTO: 9.3 FL (ref 9.2–11.8)
POTASSIUM SERPL-SCNC: 3.8 MMOL/L (ref 3.5–5.5)
PROT SERPL-MCNC: 6.9 G/DL (ref 6.4–8.2)
PROTHROMBIN TIME: 22.4 SEC (ref 11.5–15.2)
RBC # BLD AUTO: 4.45 M/UL (ref 4.7–5.5)
SODIUM SERPL-SCNC: 130 MMOL/L (ref 136–145)
TROPONIN I SERPL-MCNC: <0.02 NG/ML (ref 0–0.04)
WBC # BLD AUTO: 10.4 K/UL (ref 4.6–13.2)

## 2018-08-07 PROCEDURE — 93005 ELECTROCARDIOGRAM TRACING: CPT

## 2018-08-07 PROCEDURE — 85610 PROTHROMBIN TIME: CPT | Performed by: EMERGENCY MEDICINE

## 2018-08-07 PROCEDURE — 70450 CT HEAD/BRAIN W/O DYE: CPT

## 2018-08-07 PROCEDURE — 80307 DRUG TEST PRSMV CHEM ANLYZR: CPT | Performed by: EMERGENCY MEDICINE

## 2018-08-07 PROCEDURE — 85025 COMPLETE CBC W/AUTO DIFF WBC: CPT | Performed by: EMERGENCY MEDICINE

## 2018-08-07 PROCEDURE — 83880 ASSAY OF NATRIURETIC PEPTIDE: CPT | Performed by: EMERGENCY MEDICINE

## 2018-08-07 PROCEDURE — 99285 EMERGENCY DEPT VISIT HI MDM: CPT

## 2018-08-07 PROCEDURE — 82550 ASSAY OF CK (CPK): CPT | Performed by: EMERGENCY MEDICINE

## 2018-08-07 PROCEDURE — 80053 COMPREHEN METABOLIC PANEL: CPT | Performed by: EMERGENCY MEDICINE

## 2018-08-07 NOTE — ED NOTES
Pt has become increasingly agitated with ED staff, ED MD saleem stating \"i'm getting out of here, I'm leaving\". Pt has no ride home, states he will take the bus. Pt is aware of his etoh intoxication, pt states \"You cannot hold me here\". MD Shanel Neri @ pt bs. Pt repeating multiple times that he has to go to other appointments scheduled for 1300 today. Security notified. Pt has refused assistance by ED staff. Provided water for comfort. Pt states he falls \"all the time\".

## 2018-08-07 NOTE — ED NOTES
Pt educated on plan of care by MD Shantel Kapoor and this RN @bs, advised by MD Shantel Kapoor to continue tx in ED. Pt continues to state \"i'm getting out of here, you can't keep me here, I'm leaving\". Pt encouraged to return to ED if he would like further evaluation. Pt up and using walker in no distress at this time, ambulating to bathroom.

## 2018-08-07 NOTE — ED PROVIDER NOTES
EMERGENCY DEPARTMENT HISTORY AND PHYSICAL EXAM    6:16 AM      Date: 8/7/2018  Patient Name: Finn Lo    History of Presenting Illness     Chief Complaint   Patient presents with    Fall         History Provided By: Patient; limited by: pt intoxicated     Chief Complaint: Lightheadedness  Duration:  Hours  Timing:  Acute  Location: Head  Quality: N/A as complaint is not pain  Severity: Mild  Modifying Factors: none reported  Associated Symptoms: N/A      Additional History (Context): Finn Lo is a 58 y.o. male with HTN, CAD, heart failure who presents with lightheadedness starting hours PTA. Associated sx include fatigue. Pt was outside when he fell, states he had a few beers this morning PTA. Denies any pain. PCP: Jarvis Muro MD    Current Outpatient Prescriptions   Medication Sig Dispense Refill    carvedilol (COREG) 12.5 mg tablet Take 1 Tab by mouth two (2) times daily (with meals). 60 Tab 0    furosemide (LASIX) 20 mg tablet One pill a day 60 Tab 0    nitroglycerin (NITROSTAT) 0.4 mg SL tablet 1 Tab by SubLINGual route every five (5) minutes as needed for Chest Pain. Indications: ANGINA 60 Tab 0    warfarin (COUMADIN) 2 mg tablet Take 3 Tabs by mouth every evening. 100 Tab 0    ammonium lactate (LAC-HYDRIN) 12 % lotion rub in to affected area well 1 Bottle 0    traMADol (ULTRAM) 50 mg tablet Take 1 Tab by mouth every six (6) hours as needed. Max Daily Amount: 200 mg. 50 Tab 0    oxyCODONE-acetaminophen (PERCOCET) 5-325 mg per tablet Take 1-2 Tabs by mouth every four (4) hours as needed for Pain. Max Daily Amount: 12 Tabs. Indications: Pain 20 Tab 0    trimethoprim-sulfamethoxazole (BACTRIM DS) 160-800 mg per tablet Take 1 Tab by mouth two (2) times a day. 14 Tab 0    Menthol-Zinc Oxide (CALMOSEPTINE) 0.44-20.6 % oint Apply 1 Each to affected area daily.  Indications: SKIN IRRITATION         Past History     Past Medical History:  Past Medical History:   Diagnosis Date    CAD (coronary artery disease)     Elevated PSA     Heart failure (HCC)     Hypertension        Past Surgical History:  Past Surgical History:   Procedure Laterality Date    HX PACEMAKER         Family History:  History reviewed. No pertinent family history. Social History:  Social History   Substance Use Topics    Smoking status: Never Smoker    Smokeless tobacco: Never Used    Alcohol use Yes       Allergies:  No Known Allergies      Review of Systems       Review of Systems   Reason unable to perform ROS: limited by pt intoxication. Constitutional: Positive for fatigue. Neurological: Positive for light-headedness. Physical Exam     Visit Vitals    /76    Pulse 80    Temp 97.7 °F (36.5 °C)    Resp 20    SpO2 100%         Physical Exam  General Exam: Patient is a well developed and well nourished in no distress. Patient does not appear acutely ill or toxic. Smells of etOH. Easily awakable to verbal stimuli. Eye Exam: Lids and conjunctiva are normal  ENT Exam: The general head and facial exam is normal.  The neck is supple without meningeal signs. No significant adenopathy. Pulmonary Exam: No respiratory distress. The respiratory rate is normal.  No stridor. The breath sounds are equal bilaterally. There are no wheezes, rales, or rhonchi noted. Cardiac Exam: The cardiac rate and rhythm are normal.  No significant murmurs, rubs, or gallops. The peripheral pulses are normal.  Abdominal Exam: Abdomen is soft and non-distended. No pulsatile masses. There is no local tenderness. There is no rebound or guarding noted. Skin and Soft Tissue: The skin is warm and dry, without significant abnormality. Good color. Abrasion to mid forehead. Musculoskeletal Exam: B/l LE edema. The musculoskeletal exam of the lower extremities is normal without significant local tenderness. Psychiatric: Normal adult with appropriate demeanor and interpersonal interaction.   Is oriented to person, place, and time. Diagnostic Study Results     Labs -  Recent Results (from the past 12 hour(s))   CBC WITH AUTOMATED DIFF    Collection Time: 08/07/18  5:18 AM   Result Value Ref Range    WBC 10.4 4.6 - 13.2 K/uL    RBC 4.45 (L) 4.70 - 5.50 M/uL    HGB 13.9 13.0 - 16.0 g/dL    HCT 39.4 36.0 - 48.0 %    MCV 88.5 74.0 - 97.0 FL    MCH 31.2 24.0 - 34.0 PG    MCHC 35.3 31.0 - 37.0 g/dL    RDW 12.9 11.6 - 14.5 %    PLATELET 044 (H) 482 - 420 K/uL    MPV 9.3 9.2 - 11.8 FL    NEUTROPHILS 68 40 - 73 %    LYMPHOCYTES 23 21 - 52 %    MONOCYTES 6 3 - 10 %    EOSINOPHILS 3 0 - 5 %    BASOPHILS 0 0 - 2 %    ABS. NEUTROPHILS 7.0 1.8 - 8.0 K/UL    ABS. LYMPHOCYTES 2.4 0.9 - 3.6 K/UL    ABS. MONOCYTES 0.6 0.05 - 1.2 K/UL    ABS. EOSINOPHILS 0.3 0.0 - 0.4 K/UL    ABS. BASOPHILS 0.0 0.0 - 0.1 K/UL    DF AUTOMATED     PROTHROMBIN TIME + INR    Collection Time: 08/07/18  5:18 AM   Result Value Ref Range    Prothrombin time 22.4 (H) 11.5 - 15.2 sec    INR 2.0 (H) 0.8 - 1.2     METABOLIC PANEL, COMPREHENSIVE    Collection Time: 08/07/18  5:18 AM   Result Value Ref Range    Sodium 130 (L) 136 - 145 mmol/L    Potassium 3.8 3.5 - 5.5 mmol/L    Chloride 92 (L) 100 - 108 mmol/L    CO2 27 21 - 32 mmol/L    Anion gap 11 3.0 - 18 mmol/L    Glucose 92 74 - 99 mg/dL    BUN 12 7.0 - 18 MG/DL    Creatinine 0.82 0.6 - 1.3 MG/DL    BUN/Creatinine ratio 15 12 - 20      GFR est AA >60 >60 ml/min/1.73m2    GFR est non-AA >60 >60 ml/min/1.73m2    Calcium 7.5 (L) 8.5 - 10.1 MG/DL    Bilirubin, total 0.2 0.2 - 1.0 MG/DL    ALT (SGPT) 18 16 - 61 U/L    AST (SGOT) 18 15 - 37 U/L    Alk.  phosphatase 78 45 - 117 U/L    Protein, total 6.9 6.4 - 8.2 g/dL    Albumin 3.0 (L) 3.4 - 5.0 g/dL    Globulin 3.9 2.0 - 4.0 g/dL    A-G Ratio 0.8 0.8 - 1.7     NT-PRO BNP    Collection Time: 08/07/18  5:18 AM   Result Value Ref Range    NT pro- 0 - 900 PG/ML   ETHYL ALCOHOL    Collection Time: 08/07/18  5:18 AM   Result Value Ref Range    ALCOHOL(ETHYL),SERUM 255 (H) 0 - 3 MG/DL   CARDIAC PANEL,(CK, CKMB & TROPONIN)    Collection Time: 08/07/18  5:18 AM   Result Value Ref Range     39 - 308 U/L    CK - MB 4.1 (H) <3.6 ng/ml    CK-MB Index 3.4 0.0 - 4.0 %    Troponin-I, Qt. <0.02 0.0 - 0.045 NG/ML   EKG, 12 LEAD, INITIAL    Collection Time: 08/07/18  5:18 AM   Result Value Ref Range    Ventricular Rate 80 BPM    Atrial Rate 80 BPM    P-R Interval 136 ms    QRS Duration 120 ms    Q-T Interval 406 ms    QTC Calculation (Bezet) 468 ms    Calculated R Axis -67 degrees    Calculated T Axis -9 degrees    Diagnosis       Normal sinus rhythm  Left axis deviation  Low voltage QRS  Inferior infarct , age undetermined  Anterolateral infarct (cited on or before 07-AUG-2018)  Marked ST abnormality, possible septal subendocardial injury  ACUTE MI  Abnormal ECG  When compared with ECG of 18-MAY-2017 00:25,  Significant changes have occurred         Radiologic Studies -   CT HEAD WO CONT   Final Result        Ct Head Wo Cont    Result Date: 8/7/2018  EXAM: CT head INDICATION: Fall, lightheadedness, striking head. COMPARISON: CT head May 18, 2017 TECHNIQUE: Axial CT imaging of the head was performed without intravenous contrast. One or more dose reduction techniques were used on this CT: automated exposure control, adjustment of the mAs and/or kVp according to patient's size, and iterative reconstruction techniques. The specific techniques utilized on this CT exam have been documented in the patient's electronic medical record. _______________ FINDINGS: BRAIN AND POSTERIOR FOSSA: Mild cortical and cerebral volume loss is redemonstrated, which is within normal limits for patient age. Ventricular size and configuration remains stable. Basilar cisterns are patent. Subcortical and periventricular white matter hypoattenuation is present, unchanged. There is no intracranial hemorrhage, mass effect, or midline shift. The gray-white matter differentiation is within normal limits.  EXTRA-AXIAL SPACES AND MENINGES: There are no abnormal extra-axial fluid collections. CALVARIUM: No acute osseous abnormality. SINUSES: Imaged paranasal sinuses and mastoid air cells are clear. OTHER: Atherosclerotic vascular calcifications of the carotid siphons are present bilaterally. _______________     IMPRESSION: 1. No acute intracranial abnormality. Stable examination. 2. Mild periventricular white matter hypoattenuation; unchanged and nonspecific, likely reflecting chronic ischemic microvascular change. Medical Decision Making   I am the first provider for this patient. I reviewed the vital signs, available nursing notes, past medical history, past surgical history, family history and social history. Vital Signs-Reviewed the patient's vital signs. Pulse Oximetry Analysis -  100% on room air (Interpretation) wnl    Cardiac Monitor:  Rate: 80    EKG: Interpreted by the EP. Time Interpreted: 0518   Rate: 80   Rhythm: atrial paced   Interpretation: minor depressions seen in anterior leads, no STEMI. Records Reviewed: Nursing Notes (Time of Review: 6:16 AM)    ED Course: Progress Notes, Reevaluation, and Consults:    08:57 Pt requesting to leave. Aware that his etOH level is elevated and that it would not be safe for him as he does not have a ride at home. Pt very argumentative, states he needs to make appointments. Discussed with his nurse that if he leaves we will call non emergent PD for public intoxication. 09:14 Pt was able to ambulate with steady gait using walker to restroom. Provider Notes (Medical Decision Making): Pt with fall with ETOH abuse, appears intoxicated. Small abrasion to head. CT neg for acute injury. Labs reviewed. Pt improved in ED and able to ambulate at baseline. Wanting to leave, does not have ride. He denies any complaints of pain as he sobered. Pt not able to be redirected to stay in ED bed until safe ride can be found.  Pt clinically sober and no indication to hold pt here against his will. Diagnosis     Clinical Impression:     ICD-10-CM ICD-9-CM   1. Fall, initial encounter [address] XXXA E888.9   2. Alcoholic intoxication without complication ([facility]) G13.665 305.00         Disposition: Eloped      Patient's Medications   Start Taking    No medications on file   Continue Taking    AMMONIUM LACTATE (LAC-HYDRIN) 12 % LOTION    rub in to affected area well    CARVEDILOL (COREG) 12.5 MG TABLET    Take 1 Tab by mouth two (2) times daily (with meals). FUROSEMIDE (LASIX) 20 MG TABLET    One pill a day    MENTHOL-ZINC OXIDE (CALMOSEPTINE) 0.44-20.6 % OINT    Apply 1 Each to affected area daily. Indications: SKIN IRRITATION    NITROGLYCERIN (NITROSTAT) 0.4 MG SL TABLET    1 Tab by SubLINGual route every five (5) minutes as needed for Chest Pain. Indications: ANGINA    OXYCODONE-ACETAMINOPHEN (PERCOCET) 5-325 MG PER TABLET    Take 1-2 Tabs by mouth every four (4) hours as needed for Pain. Max Daily Amount: 12 Tabs. Indications: Pain    TRAMADOL (ULTRAM) 50 MG TABLET    Take 1 Tab by mouth every six (6) hours as needed. Max Daily Amount: 200 mg. TRIMETHOPRIM-SULFAMETHOXAZOLE (BACTRIM DS) 160-800 MG PER TABLET    Take 1 Tab by mouth two (2) times a day. WARFARIN (COUMADIN) 2 MG TABLET    Take 3 Tabs by mouth every evening. These Medications have changed    No medications on file   Stop Taking    No medications on file     _______________________________    Attestations:  [address] [place] [address] acting as a scribe for and in the presence of Venice Hernandez MD      August 07, 2018 at 10:48 AM       Provider Attestation:      I personally performed the services described in the documentation, reviewed the documentation, as recorded by the scribe in my presence, and it accurately and completely records my words and actions.  August 07, 2018 at 10:48 AM - Venice Hernandez MD    _______________________________

## 2018-08-07 NOTE — ED TRIAGE NOTES
Patient brought in by EMS for a fall. Patient states that he was walking around with his walker and fell down and tried to get up for several hours before a jogger called 911. Patient admits to ETOH consumption.   Patient states that he got light headed prior to the fall

## 2018-08-07 NOTE — ED NOTES
Verbal shift change report given to [address] [place] [address] (oncoming nurse) by Fernie Kay RN (offgoing nurse). Report included the following information ED Summary.  Patient in CT at this time

## 2018-08-07 NOTE — ED NOTES
Pt has left emergency department. [employer] notified by security that pt has eloped. MD Elsi Hoffmann aware. IV removed, sterile dressing applied.

## 2018-08-11 ENCOUNTER — APPOINTMENT (OUTPATIENT)
Dept: CT IMAGING | Age: 63
End: 2018-08-11
Attending: EMERGENCY MEDICINE
Payer: MEDICARE

## 2018-08-11 ENCOUNTER — HOSPITAL ENCOUNTER (EMERGENCY)
Age: 63
Discharge: HOME | End: 2018-08-11
Attending: EMERGENCY MEDICINE
Payer: MEDICARE

## 2018-08-11 VITALS
DIASTOLIC BLOOD PRESSURE: 57 MMHG | HEIGHT: 71 IN | SYSTOLIC BLOOD PRESSURE: 134 MMHG | OXYGEN SATURATION: 100 % | RESPIRATION RATE: 18 BRPM | HEART RATE: 81 BPM | TEMPERATURE: 98.3 F | BODY MASS INDEX: 35 KG/M2 | WEIGHT: 250 LBS

## 2018-08-11 DIAGNOSIS — S09.90XA CLOSED HEAD INJURY, INITIAL ENCOUNTER: Primary | ICD-10-CM

## 2018-08-11 DIAGNOSIS — Z79.01 ANTICOAGULATED ON COUMADIN: ICD-10-CM

## 2018-08-11 DIAGNOSIS — F10.920 ALCOHOLIC INTOXICATION WITHOUT COMPLICATION (HCC): ICD-10-CM

## 2018-08-11 DIAGNOSIS — S01.81XA FACIAL LACERATION, INITIAL ENCOUNTER: ICD-10-CM

## 2018-08-11 LAB
ANION GAP SERPL CALC-SCNC: 10 MMOL/L (ref 3–18)
ATRIAL RATE: 80 BPM
BASOPHILS # BLD: 0.1 K/UL (ref 0–0.1)
BASOPHILS NFR BLD: 1 % (ref 0–2)
BUN SERPL-MCNC: 12 MG/DL (ref 7–18)
BUN/CREAT SERPL: 19 (ref 12–20)
CALCIUM SERPL-MCNC: 7.9 MG/DL (ref 8.5–10.1)
CALCULATED R AXIS, ECG10: -67 DEGREES
CALCULATED T AXIS, ECG11: -9 DEGREES
CHLORIDE SERPL-SCNC: 97 MMOL/L (ref 100–108)
CO2 SERPL-SCNC: 23 MMOL/L (ref 21–32)
CREAT SERPL-MCNC: 0.62 MG/DL (ref 0.6–1.3)
DIAGNOSIS, 93000: NORMAL
DIFFERENTIAL METHOD BLD: ABNORMAL
EOSINOPHIL # BLD: 0.7 K/UL (ref 0–0.4)
EOSINOPHIL NFR BLD: 6 % (ref 0–5)
ERYTHROCYTE [DISTWIDTH] IN BLOOD BY AUTOMATED COUNT: 13.4 % (ref 11.6–14.5)
GLUCOSE SERPL-MCNC: 87 MG/DL (ref 74–99)
HCT VFR BLD AUTO: 36.8 % (ref 36–48)
HGB BLD-MCNC: 12.8 G/DL (ref 13–16)
INR PPP: 2.7 (ref 0.8–1.2)
LYMPHOCYTES # BLD: 3.8 K/UL (ref 0.9–3.6)
LYMPHOCYTES NFR BLD: 30 % (ref 21–52)
MCH RBC QN AUTO: 31.1 PG (ref 24–34)
MCHC RBC AUTO-ENTMCNC: 34.8 G/DL (ref 31–37)
MCV RBC AUTO: 89.5 FL (ref 74–97)
MONOCYTES # BLD: 0.8 K/UL (ref 0.05–1.2)
MONOCYTES NFR BLD: 6 % (ref 3–10)
NEUTS SEG # BLD: 7.2 K/UL (ref 1.8–8)
NEUTS SEG NFR BLD: 57 % (ref 40–73)
P-R INTERVAL, ECG05: 136 MS
PLATELET # BLD AUTO: 444 K/UL (ref 135–420)
PMV BLD AUTO: 8.7 FL (ref 9.2–11.8)
POTASSIUM SERPL-SCNC: 3.6 MMOL/L (ref 3.5–5.5)
PROTHROMBIN TIME: 28.6 SEC (ref 11.5–15.2)
Q-T INTERVAL, ECG07: 406 MS
QRS DURATION, ECG06: 120 MS
QTC CALCULATION (BEZET), ECG08: 468 MS
RBC # BLD AUTO: 4.11 M/UL (ref 4.7–5.5)
SODIUM SERPL-SCNC: 130 MMOL/L (ref 136–145)
VENTRICULAR RATE, ECG03: 80 BPM
WBC # BLD AUTO: 12.5 K/UL (ref 4.6–13.2)

## 2018-08-11 PROCEDURE — 85025 COMPLETE CBC W/AUTO DIFF WBC: CPT | Performed by: EMERGENCY MEDICINE

## 2018-08-11 PROCEDURE — 72125 CT NECK SPINE W/O DYE: CPT

## 2018-08-11 PROCEDURE — 80048 BASIC METABOLIC PNL TOTAL CA: CPT | Performed by: EMERGENCY MEDICINE

## 2018-08-11 PROCEDURE — 70450 CT HEAD/BRAIN W/O DYE: CPT

## 2018-08-11 PROCEDURE — 99284 EMERGENCY DEPT VISIT MOD MDM: CPT

## 2018-08-11 PROCEDURE — 75810000293 HC SIMP/SUPERF WND  RPR

## 2018-08-11 PROCEDURE — 77030031132 HC SUT NYL COVD -A

## 2018-08-11 PROCEDURE — 85610 PROTHROMBIN TIME: CPT | Performed by: EMERGENCY MEDICINE

## 2018-08-11 PROCEDURE — 77030018836 HC SOL IRR NACL ICUM -A

## 2018-08-11 NOTE — ED NOTES
5:12 AM  08/11/18     Discharge instructions given to patient (name) with verbalization of understanding. Patient accompanied by self. Patient discharged with the following prescriptions none. Patient discharged to home (destination).       Haley Colin RN

## 2018-08-11 NOTE — ED NOTES
Pt forehead laceration assessed and repaired by MD, pt face and scalp cleansed, nonadherent dressing applied and reinforced with 4x4s and wrapped in gauze.

## 2018-08-11 NOTE — ED NOTES
Pt brought to ER via EMS with c/o falling head first into the pavement on a walk to [facility], [place]. Pt admits to having \"a few beers\" and takes warfarin.

## 2018-08-11 NOTE — ED PROVIDER NOTES
EMERGENCY DEPARTMENT HISTORY AND PHYSICAL EXAM    1:40 AM      Date: 8/11/2018  Patient Name: Stewart Medina    History of Presenting Illness     Chief Complaint   Patient presents with    Laceration    Fall         History Provided By: Patient    Chief Complaint: laceration, headache  Duration:30  Minutes  Timing:  Acute  Location: forehead  Quality: Aching  Severity: Moderate  Modifying Factors: after a fall  Associated Symptoms: denies any other associated signs or symptoms      Additional History (Context): Stewart Medina is a 58 y.o. male with hypertension and CHF who presents with 30 minutes of acute onset forehead laceration with moderate aching forehead headache after a fall. Pt states that sometimes his blood pressure drops and he gets lightheaded and he falls which is what happened tonight. Pt is on Warfarin. Pt admits to [facility] use. No other concerns or symptoms at this time. PCP: Anastasia Vaz MD    Current Outpatient Prescriptions   Medication Sig Dispense Refill    docusate sodium (COLACE) 100 mg capsule 100 mg.  furosemide (LASIX) 40 mg tablet       lisinopril (PRINIVIL, ZESTRIL) 5 mg tablet       spironolactone (ALDACTONE) 25 mg tablet       warfarin (COUMADIN) 7.5 mg tablet       carvedilol (COREG) 12.5 mg tablet Take 1 Tab by mouth two (2) times daily (with meals). 60 Tab 0    furosemide (LASIX) 20 mg tablet One pill a day 60 Tab 0    nitroglycerin (NITROSTAT) 0.4 mg SL tablet 1 Tab by SubLINGual route every five (5) minutes as needed for Chest Pain. Indications: ANGINA 60 Tab 0    ammonium lactate (LAC-HYDRIN) 12 % lotion rub in to affected area well 1 Bottle 0    traMADol (ULTRAM) 50 mg tablet Take 1 Tab by mouth every six (6) hours as needed. Max Daily Amount: 200 mg. 50 Tab 0    oxyCODONE-acetaminophen (PERCOCET) 5-325 mg per tablet Take 1-2 Tabs by mouth every four (4) hours as needed for Pain. Max Daily Amount: 12 Tabs.  Indications: Pain 20 Tab 0    trimethoprim-sulfamethoxazole (BACTRIM DS) 160-800 mg per tablet Take 1 Tab by mouth two (2) times a day. 14 Tab 0    Menthol-Zinc Oxide (CALMOSEPTINE) 0.44-20.6 % oint Apply 1 Each to affected area daily. Indications: SKIN IRRITATION         Past History     Past Medical History:  Past Medical History:   Diagnosis Date    CAD (coronary artery disease)     Elevated PSA     Heart failure ([facility] [address]     Hypertension        Past Surgical History:  Past Surgical History:   Procedure Laterality Date    HX PACEMAKER         Family History:  History reviewed. No pertinent family history. Social History:  Social History   Substance Use Topics    Smoking status: Never Smoker    Smokeless tobacco: Never Used    Alcohol use Yes       Allergies: Allergies   Allergen Reactions    Copper Other (comments)         Review of Systems       Review of Systems   Skin: Positive for wound (forehead laceration). Neurological: Positive for headaches (forehead). All other systems reviewed and are negative. Physical Exam     Visit Vitals    /57    Pulse 81    Temp 98.3 °F (36.8 °C)    Resp 18    Ht 5' 11\" (1.803 m)    Wt 113.4 kg (250 lb)    SpO2 100%    BMI 34.87 kg/m2         Physical Exam  General:  Well-developed, well-nourished, no apparent distress. Head:  Normocephalic 4 cm laceration vertical frontal scalp no depressed skull fracture . Eyes:  Pupils midrange extraocular movements intact. No pallor or conjunctival injection. Nose:  No rhinorrhea, inspection grossly normal.    Ears:  Grossly normal to inspection, no discharge. Mouth:  Mucous membranes moist, no appreciable intraoral lesion. Neck/Back:  Trachea midline, no asymmetry. No pain on palpation down cervical, thoracic, or lumbar spine step-off or deformity. Chest:  Grossly normal inspection, symmetric chest rise. Pulmonary:  Clear to auscultation bilaterally no wheezes rhonchi or rales.     Cardiovascular:  S1-S2 no murmurs rubs or gallops. Abdomen: Soft, nontender, nondistended no guarding rebound or peritoneal signs. Extremities:  Grossly normal to inspection, peripheral pulses intact  multiple abrasions and ecchymosis of the bilateral forearms  Neurologic:  Alert and oriented no appreciable focal neurologic deficit     Skin:  Warm and dry  Psychiatric:  Grossly normal mood and affect  Nursing note reviewed, vital signs reviewed. Diagnostic Study Results     Labs -  Recent Results (from the past 12 hour(s))   CBC WITH AUTOMATED DIFF    Collection Time: 08/11/18  2:12 AM   Result Value Ref Range    WBC 12.5 4.6 - 13.2 K/uL    RBC 4.11 (L) 4.70 - 5.50 M/uL    HGB 12.8 (L) 13.0 - 16.0 g/dL    HCT 36.8 36.0 - 48.0 %    MCV 89.5 74.0 - 97.0 FL    MCH 31.1 24.0 - 34.0 PG    MCHC 34.8 31.0 - 37.0 g/dL    RDW 13.4 11.6 - 14.5 %    PLATELET 738 (H) 056 - 420 K/uL    MPV 8.7 (L) 9.2 - 11.8 FL    NEUTROPHILS 57 40 - 73 %    LYMPHOCYTES 30 21 - 52 %    MONOCYTES 6 3 - 10 %    EOSINOPHILS 6 (H) 0 - 5 %    BASOPHILS 1 0 - 2 %    ABS. NEUTROPHILS 7.2 1.8 - 8.0 K/UL    ABS. LYMPHOCYTES 3.8 (H) 0.9 - 3.6 K/UL    ABS. MONOCYTES 0.8 0.05 - 1.2 K/UL    ABS. EOSINOPHILS 0.7 (H) 0.0 - 0.4 K/UL    ABS.  BASOPHILS 0.1 0.0 - 0.1 K/UL    DF AUTOMATED     METABOLIC PANEL, BASIC    Collection Time: 08/11/18  2:12 AM   Result Value Ref Range    Sodium 130 (L) 136 - 145 mmol/L    Potassium 3.6 3.5 - 5.5 mmol/L    Chloride 97 (L) 100 - 108 mmol/L    CO2 23 21 - 32 mmol/L    Anion gap 10 3.0 - 18 mmol/L    Glucose 87 74 - 99 mg/dL    BUN 12 7.0 - 18 MG/DL    Creatinine 0.62 0.6 - 1.3 MG/DL    BUN/Creatinine ratio 19 12 - 20      GFR est AA >60 >60 ml/min/1.73m2    GFR est non-AA >60 >60 ml/min/1.73m2    Calcium 7.9 (L) 8.5 - 10.1 MG/DL   PROTHROMBIN TIME + INR    Collection Time: 08/11/18  2:12 AM   Result Value Ref Range    Prothrombin time 28.6 (H) 11.5 - 15.2 sec    INR 2.7 (H) 0.8 - 1.2         Radiologic Studies -   CT SPINE CERV WO CONT    (Results Pending)   CT HEAD WO CONT    (Results Pending)         Medical Decision Making   I am the first provider for this patient. I reviewed the vital signs, available nursing notes, past medical history, past surgical history, family history and social history. Vital Signs-Reviewed the patient's vital signs. Records Reviewed: Nursing Notes (Time of Review: 1:40 AM)    ED Course: Progress Notes, Reevaluation, and Consults:  Patient presents after a reported trip and fall reports that his blood pressure is low and suspect that this is due to his alcohol usage as he is clinically intoxicated. We'll plan for CT head and neck, no need for tetanus shot and primary repair the laceration here    CTs negative    Procedure note:  Laceration repair  Performed by: Myself  Preparation: Irrigation and standard sterile precautions  Location: Forehead  Wound length: 4 cm  Anesthesia:  Local injection of lidocaine  Foreign bodies: None  Irrigation via saline jet lavage  Debridement: None  Skin closure:  5-0 nylon simple interrupted  Number of sutures: 3    M.D. procedure time: 10 minutes    Patient was monitored until sobriety was able ambulate with a non-ataxic gait, no slurred speech stable for discharge at this time,    Patient's presentation, history, physical exam and laboratory evaluations were reviewed. At this time patient was felt to be stable for outpatient management and follow with primary care/specialist.  Patient was instructed to return to the emergency department with any concerns. Disposition:    Discharged home      Portions of this chart were created with Dragon medical speech to text program.   Unrecognized errors may be present. Diagnosis     Clinical Impression:   1. Closed head injury, initial encounter    2. Alcoholic intoxication without complication ([address]    3. Anticoagulated on Coumadin    4.  Facial laceration, initial encounter          Follow-up Information     Follow up With Details Comments Contact Zeynep Alfonso MD Call in 2 days  [address] EMERGENCY DEPT  As needed, If symptoms worsen [address] [facility].  [phone]           Discharge Medication List as of 8/11/2018  5:04 AM      CONTINUE these medications which have NOT CHANGED    Details   docusate sodium (COLACE) 100 mg capsule 100 mg., Historical Med      !! furosemide (LASIX) 40 mg tablet Historical Med      lisinopril (PRINIVIL, ZESTRIL) 5 mg tablet Historical Med      spironolactone (ALDACTONE) 25 mg tablet Historical Med      warfarin (COUMADIN) 7.5 mg tablet Historical Med      carvedilol (COREG) 12.5 mg tablet Take 1 Tab by mouth two (2) times daily (with meals). , Print, Disp-60 Tab, R-0      !! furosemide (LASIX) 20 mg tablet One pill a day, Print, Disp-60 Tab, R-0      nitroglycerin (NITROSTAT) 0.4 mg SL tablet 1 Tab by SubLINGual route every five (5) minutes as needed for Chest Pain. Indications: ANGINA, Print, Disp-60 Tab, R-0      ammonium lactate (LAC-HYDRIN) 12 % lotion rub in to affected area well, Print, Disp-1 Bottle, R-0      traMADol (ULTRAM) 50 mg tablet Take 1 Tab by mouth every six (6) hours as needed. Max Daily Amount: 200 mg., Print, Disp-50 Tab, R-0      oxyCODONE-acetaminophen (PERCOCET) 5-325 mg per tablet Take 1-2 Tabs by mouth every four (4) hours as needed for Pain. Max Daily Amount: 12 Tabs. Indications: Pain, Print, Disp-20 Tab, R-0      trimethoprim-sulfamethoxazole (BACTRIM DS) 160-800 mg per tablet Take 1 Tab by mouth two (2) times a day., No Print, Disp-14 Tab, R-0      Menthol-Zinc Oxide (CALMOSEPTINE) 0.44-20.6 % oint Apply 1 Each to affected area daily. Indications: SKIN IRRITATION, Historical Med       !! - Potential duplicate medications found.  Please discuss with provider.        _______________________________    Attestations:  Scribe [facility] acting as a scribe for and in the presence of Marko Reyes Jewel Lambert MD      August 11, 2018 at 1:40 AM       Provider Attestation:      I personally performed the services described in the documentation, reviewed the documentation, as recorded by the scribe in my presence, and it accurately and completely records my words and actions.  August 11, 2018 at 1:40 AM - Pita Duarte MD    _______________________________

## 2018-08-11 NOTE — DISCHARGE INSTRUCTIONS
Taking Warfarin Safely: Care Instructions  Your Care Instructions    Warfarin is a medicine that you take to prevent blood clots. It is often called a blood thinner. Doctors give warfarin (such as Coumadin) to reduce the risk of blood clots. You may be at risk for blood clots if you have atrial fibrillation or deep vein thrombosis. Some other health problems may also put you at risk. Warfarin slows the amount of time it takes for your blood to clot. It can cause bleeding problems. Even if you've been taking warfarin for a while, it's important to know how to take it safely. Foods and other medicines can affect the way warfarin works. Some can make warfarin work too well. This can cause bleeding problems. And some can make it work poorly, so that it does not prevent blood clots very well. You will need regular blood tests to check how long it takes for your blood to form a clot. This test is called a PT or prothrombin time test. The result of the test is called an INR level. Depending on the test results, your doctor or anticoagulation clinic may adjust your dose of warfarin. Follow-up care is a key part of your treatment and safety. Be sure to make and go to all appointments, and call your doctor if you are having problems. It's also a good idea to know your test results and keep a list of the medicines you take. How can you care for yourself at home? Take warfarin safely  · Take your warfarin at the same time each day. · If you miss a dose of warfarin, don't take an extra dose to make up for it. Your doctor can tell you exactly what to do so you don't take too much or too little. · Wear medical alert jewelry that lets others know that you take warfarin. You can buy this at most drugs[employer] Biologics Corporationes. · Don't take warfarin if you are pregnant or planning to get pregnant. Talk to your doctor about how you can prevent getting pregnant while you are taking it.   · Don't change your dose or stop taking warfarin unless your doctor tells you to. Effects of medicines and food on warfarin  · Don't start or stop taking any medicines, vitamins, or natural remedies unless you first talk to your doctor. Many medicines can affect how warfarin works. These include aspirin and other pain relievers, over-the-counter medicines, multivitamins, dietary supplements, and herbal products. · Tell all of your doctors and pharmacists that you take warfarin. Some prescription medicines can affect how warfarin works. · Keep the amount of vitamin K in your diet about the same from day to day. Do not suddenly eat a lot more or a lot less food that is rich in vitamin K than you usually do. Vitamin K affects how warfarin works and how your blood clots. Talk with your doctor before making big changes in your diet. Foods that have a lot of vitamin K include cooked green vegetables, such as:  ¨ Kale, spinach, turnip greens, paul greens, Swiss chard, and mustard greens. ¨ Cornell sprouts, broccoli, and asparagus. · Limit your use of alcohol. Avoid bleeding by preventing falls and injuries  · Wear slippers or shoes with nonskid soles. · Remove throw rugs and clutter. · Rearrange furniture and electrical cords to keep them out of walking paths. · Keep stairways, porches, and outside walkways well lit. Use night-lights in hallways and bathrooms. · Be extra careful when you work with sharp tools or knives. When should you call for help? Call 911 anytime you think you may need emergency care. For example, call if:    · You have a sudden, severe headache that is different from past headaches.    Call your doctor now or seek immediate medical care if:    · You have any abnormal bleeding, such as:  ¨ Nosebleeds. ¨ Vaginal bleeding that is different (heavier, more frequent, at a different time of the month) than what you are used to. ¨ Bloody or black stools, or rectal bleeding.   ¨ Bloody or pink urine.    Watch closely for changes in your health, and be sure to contact your doctor if you have any problems. Where can you learn more? Go to http://melyssa-sudha.info/. Enter W558 in the search box to learn more about \"Taking Warfarin Safely: Care Instructions. \"  Current as of: December 6, 2017  Content Version: 11.7  © 0080-6429 [employer]. Care instructions adapted under license by [employer] (which disclaims liability or warranty for this information). If you have questions about a medical condition or this instruction, always ask your healthcare professional. [address] any warranty or liability for your use of this information. Cuts: Care Instructions  Your Care Instructions  A cut can happen anywhere on your body. Stitches, staples, skin adhesives, or pieces of tape called Steri-Strips are sometimes used to keep the edges of a cut together and help it heal. Steri-Strips can be used by themselves or with stitches or staples. Sometimes cuts are left open. If the cut went deep and through the skin, the doctor may have closed the cut in two layers. A deeper layer of stitches brings the deep part of the cut together. These stitches will dissolve and don't need to be removed. The upper layer closure, which could be stitches, staples, Steri-Strips, or adhesive, is what you see on the cut. A cut is often covered by a bandage. The doctor has checked you carefully, but problems can develop later. If you notice any problems or new symptoms, get medical treatment right away. Follow-up care is a key part of your treatment and safety. Be sure to make and go to all appointments, and call your doctor if you are having problems. It's also a good idea to know your test results and keep a list of the medicines you take. How can you care for yourself at home? If a cut is open or closed  · Prop up the sore area on a pillow anytime you sit or lie down during the next 3 days.  Try to keep it above the level of your heart. This will help reduce swelling. · Keep the cut dry for the first 24 to 48 hours. After this, you can shower if your doctor okays it. Pat the cut dry. · Don't soak the cut, such as in a bathtub. Your doctor will tell you when it's safe to get the cut wet. · After the first 24 to 48 hours, clean the cut with soap and water 2 times a day unless your doctor gives you different instructions. ¨ Don't use hydrogen peroxide or alcohol, which can slow healing. ¨ You may cover the cut with a thin layer of petroleum jelly and a nonstick bandage. ¨ If the doctor put a bandage over the cut, put on a new bandage after cleaning the cut or if the bandage gets wet or dirty. · Avoid any activity that could cause your cut to reopen. · Be safe with medicines. Read and follow all instructions on the label. ¨ If the doctor gave you a prescription medicine for pain, take it as prescribed. ¨ If you are not taking a prescription pain medicine, ask your doctor if you can take an over-the-counter medicine. If the cut is closed with stitches, staples, or Steri-Strips  · Follow the above instructions for open or closed cuts. · Do not remove the stitches or staples on your own. Your doctor will tell you when to come back to have the stitches or staples removed. · Leave Steri-Strips on until they fall off. If the cut is closed with a skin adhesive  · Follow the above instructions for open or closed cuts. · Leave the skin adhesive on your skin until it falls off on its own. This may take 5 to 10 days. · Do not scratch, rub, or pick at the adhesive. · Do not put the sticky part of a bandage directly on the adhesive. · Do not put any kind of ointment, cream, or lotion over the area. This can make the adhesive fall off too soon. Do not use hydrogen peroxide or alcohol, which can slow healing. When should you call for help?   Call your doctor now or seek immediate medical care if:    · You have new pain, or your pain gets worse.     · The skin near the cut is cold or pale or changes color.     · You have tingling, weakness, or numbness near the cut.     · The cut starts to bleed, and blood soaks through the bandage. Oozing small amounts of blood is normal.     · You have trouble moving the area near the cut.     · You have symptoms of infection, such as:  ¨ Increased pain, swelling, warmth, or redness around the cut. ¨ Red streaks leading from the cut. ¨ Pus draining from the cut. ¨ A fever.    Watch closely for changes in your health, and be sure to contact your doctor if:    · The cut reopens.     · You do not get better as expected. Where can you learn more? Go to http://melyssa-sudha.info/. Enter M735 in the search box to learn more about \"Cuts: Care Instructions. \"  Current as of: November 20, 2017  Content Version: 11.7  © 8633-1018 [employer]. Care instructions adapted under license by [employer] (which disclaims liability or warranty for this information). If you have questions about a medical condition or this instruction, always ask your healthcare professional. [address] any warranty or liability for your use of this information.

## 2018-08-14 ENCOUNTER — HOSPITAL ENCOUNTER (EMERGENCY)
Age: 63
Discharge: HOME | End: 2018-08-14
Attending: EMERGENCY MEDICINE
Payer: MEDICARE

## 2018-08-14 VITALS
DIASTOLIC BLOOD PRESSURE: 69 MMHG | WEIGHT: 250 LBS | SYSTOLIC BLOOD PRESSURE: 115 MMHG | OXYGEN SATURATION: 97 % | HEART RATE: 79 BPM | RESPIRATION RATE: 16 BRPM | TEMPERATURE: 98.2 F | BODY MASS INDEX: 35 KG/M2 | HEIGHT: 71 IN

## 2018-08-14 DIAGNOSIS — Z51.89 VISIT FOR WOUND CHECK: Primary | ICD-10-CM

## 2018-08-14 PROCEDURE — 99282 EMERGENCY DEPT VISIT SF MDM: CPT

## 2018-08-14 NOTE — ED NOTES
I have reviewed discharge instructions with the patient. The patient verbalized understanding. Pt agreeable to dc plan, pt in nad ambulatory to ED lobby with walker. Pt verbalized follow up with pcp regarding suture removal, pt voices appreciation at time of dc.

## 2018-08-14 NOTE — ED TRIAGE NOTES
Patient was seen here on Sat, had stitches placed, states the wound is still draining (discharge).  States he needs the bandage changed

## 2018-08-14 NOTE — ED PROVIDER NOTES
EMERGENCY DEPARTMENT HISTORY AND PHYSICAL EXAM    9:48 AM      Date: 8/14/2018  Patient Name: Clementina Ventura    History of Presenting Illness     Chief Complaint   Patient presents with    Wound Check         History Provided By: Patient    Chief Complaint: wound check  Duration:  Days  Timing:  Improving  Location: head  Quality: Aching  Severity: Mild  Modifying Factors: sutures, fall  Associated Symptoms: denies any other associated signs or symptoms      Additional History (Context): Clementina Ventura is a 58 y.o. male with hypertension and heart failure who presents with wound check from a fall three days ago. States he is unable to change his dressing by himself. States mild oozing but no pain. Denies fever or N/V    PCP: Dez Larose MD    Current Outpatient Prescriptions   Medication Sig Dispense Refill    furosemide (LASIX) 40 mg tablet       lisinopril (PRINIVIL, ZESTRIL) 5 mg tablet       spironolactone (ALDACTONE) 25 mg tablet       warfarin (COUMADIN) 7.5 mg tablet       carvedilol (COREG) 12.5 mg tablet Take 1 Tab by mouth two (2) times daily (with meals). 60 Tab 0    furosemide (LASIX) 20 mg tablet One pill a day 60 Tab 0    nitroglycerin (NITROSTAT) 0.4 mg SL tablet 1 Tab by SubLINGual route every five (5) minutes as needed for Chest Pain. Indications: ANGINA 60 Tab 0    ammonium lactate (LAC-HYDRIN) 12 % lotion rub in to affected area well 1 Bottle 0    oxyCODONE-acetaminophen (PERCOCET) 5-325 mg per tablet Take 1-2 Tabs by mouth every four (4) hours as needed for Pain. Max Daily Amount: 12 Tabs. Indications: Pain 20 Tab 0    traMADol (ULTRAM) 50 mg tablet Take 1 Tab by mouth every six (6) hours as needed. Max Daily Amount: 200 mg. 50 Tab 0    Menthol-Zinc Oxide (CALMOSEPTINE) 0.44-20.6 % oint Apply 1 Each to affected area daily.  Indications: SKIN IRRITATION         Past History     Past Medical History:  Past Medical History:   Diagnosis Date    CAD (coronary artery disease)     Elevated PSA     Heart failure (HCC)     Hypertension        Past Surgical History:  Past Surgical History:   Procedure Laterality Date    ABDOMEN SURGERY PROC UNLISTED      hernia    HX PACEMAKER         Family History:  History reviewed. No pertinent family history. Social History:  Social History   Substance Use Topics    Smoking status: Never Smoker    Smokeless tobacco: Never Used    Alcohol use Yes       Allergies: Allergies   Allergen Reactions    Copper Other (comments)         Review of Systems     Constitutional:  Denies malaise, fever, chills. Head:  inujury  Face:  Denies injury or pain. ENMT:  Denies sore throat. Neck:  Denies injury or pain. Chest:  Denies injury. Cardiac:  Denies chest pain or palpitations. Respiratory:  Denies cough, wheezing, difficulty breathing, shortness of breath. GI/ABD:  Denies injury, pain, distention, nausea, vomiting, diarrhea. :  Denies injury, pain, dysuria or urgency. Back:  Denies injury or pain. Pelvis:  Denies injury or pain. Extremity/MS:  Denies injury or pain. Neuro:  Denies headache, LOC, dizziness, neurologic symptoms/deficits/paresthesias. Skin: Denies injury, rash, itching or skin changes. Physical Exam     Visit Vitals    /69 (BP 1 Location: Right arm, BP Patient Position: At rest;Sitting)    Pulse 79    Temp 98.2 °F (36.8 °C)    Resp 16    Ht 5' 11\" (1.803 m)    Wt 113.4 kg (250 lb)    SpO2 97%    BMI 34.87 kg/m2       CONSTITUTIONAL: Alert, in no apparent distress; well-developed; well-nourished. HEAD:  Normocephalic, forehead with sutures in place, mild eschar over wound, no erythema or purulent drainage. EYES: PERRL; EOM's intact. ENTM: Nose: No rhinorrhea; Throat: mucous membranes moist. Posterior pharynx-normal.  Neck:  No JVD, supple without lymphadenopathy. RESP: Chest clear, equal breath sounds. CV: S1 and S2 WNL; No murmurs, gallops or rubs.    NEURO: strength 5/5 and sym, sensation intact. SKIN: No rashes; Normal for age and stage. PSYCH:  Alert and oriented, normal affect. Diagnostic Study Results     Labs -  No results found for this or any previous visit (from the past 12 hour(s)). Radiologic Studies -   No orders to display         Medical Decision Making   I am the first provider for this patient. I reviewed the vital signs, available nursing notes, past medical history, past surgical history, family history and social history. Vital Signs-Reviewed the patient's vital signs. Pulse Oximetry Analysis -  97 on room air (Interpretation)wnl      Records Reviewed: Nursing Notes and Old Medical Records (Time of Review: 9:48 AM)    ED Course: Progress Notes, Reevaluation, and Consults:      Provider Notes (Medical Decision Making): Will check wound for signs of infection and make sure healing as planned. IMPRESSION AND MEDICAL DECISION MAKING:  Based upon the patient's presentation with noted HPI and PE, along with the work up done in the emergency department, I believe that the patient has a well healing wound. Will have dressing change and follow up as directed. The patient will be discharged home. Warning signs of worsening condition were discussed and understood by the patient. Based on patient's age, coexisting illness, exam, and the results of this ED evaluation, the decision to treat as an outpatient was made. Based on the information available at time of discharge, acute pathology requiring immediate intervention was deemed relative unlikely. While it is impossible to completely exclude the possibility of underlying serious disease or worsening of condition, I feel the relative likelihood is extremely low. I discussed this uncertainty with the patient, who understood ED evaluation and treatment and felt comfortable with the outpatient treatment plan. All questions regarding care, test results, and follow up were answered.  The patient is stable and appropriate to discharge. They understand that they should return to the emergency department for any new or worsening symptoms. I stressed the importance of follow up for repeat assessment and possibly further evaluation/treatment. Diagnosis     Clinical Impression:   1.  Visit for wound check      _______________________________

## 2018-08-16 ENCOUNTER — APPOINTMENT (OUTPATIENT)
Dept: CT IMAGING | Age: 63
End: 2018-08-16
Attending: EMERGENCY MEDICINE
Payer: MEDICARE

## 2018-08-16 ENCOUNTER — HOSPITAL ENCOUNTER (EMERGENCY)
Age: 63
Discharge: LEFT BEFORE DISCHARGE | End: 2018-08-16
Attending: EMERGENCY MEDICINE
Payer: MEDICARE

## 2018-08-16 VITALS
RESPIRATION RATE: 18 BRPM | BODY MASS INDEX: 35 KG/M2 | TEMPERATURE: 98.9 F | HEART RATE: 80 BPM | WEIGHT: 250 LBS | HEIGHT: 71 IN | SYSTOLIC BLOOD PRESSURE: 106 MMHG | DIASTOLIC BLOOD PRESSURE: 64 MMHG | OXYGEN SATURATION: 98 %

## 2018-08-16 LAB
ALBUMIN SERPL-MCNC: 3.2 G/DL (ref 3.4–5)
ALBUMIN/GLOB SERPL: 0.6 {RATIO} (ref 0.8–1.7)
ALP SERPL-CCNC: 83 U/L (ref 45–117)
ALT SERPL-CCNC: 20 U/L (ref 16–61)
ANION GAP SERPL CALC-SCNC: 9 MMOL/L (ref 3–18)
AST SERPL-CCNC: 17 U/L (ref 15–37)
BASOPHILS # BLD: 0.1 K/UL (ref 0–0.1)
BASOPHILS NFR BLD: 1 % (ref 0–2)
BILIRUB SERPL-MCNC: 0.3 MG/DL (ref 0.2–1)
BUN SERPL-MCNC: 12 MG/DL (ref 7–18)
BUN/CREAT SERPL: 17 (ref 12–20)
CALCIUM SERPL-MCNC: 8.3 MG/DL (ref 8.5–10.1)
CHLORIDE SERPL-SCNC: 94 MMOL/L (ref 100–108)
CO2 SERPL-SCNC: 25 MMOL/L (ref 21–32)
CREAT SERPL-MCNC: 0.69 MG/DL (ref 0.6–1.3)
DIFFERENTIAL METHOD BLD: ABNORMAL
EOSINOPHIL # BLD: 0.7 K/UL (ref 0–0.4)
EOSINOPHIL NFR BLD: 8 % (ref 0–5)
ERYTHROCYTE [DISTWIDTH] IN BLOOD BY AUTOMATED COUNT: 13.5 % (ref 11.6–14.5)
ETHANOL SERPL-MCNC: 282 MG/DL (ref 0–3)
GLOBULIN SER CALC-MCNC: 5.3 G/DL (ref 2–4)
GLUCOSE SERPL-MCNC: 91 MG/DL (ref 74–99)
HCT VFR BLD AUTO: 41.8 % (ref 36–48)
HGB BLD-MCNC: 14.7 G/DL (ref 13–16)
INR PPP: 3.7 (ref 0.8–1.2)
LYMPHOCYTES # BLD: 3.7 K/UL (ref 0.9–3.6)
LYMPHOCYTES NFR BLD: 39 % (ref 21–52)
MCH RBC QN AUTO: 31.3 PG (ref 24–34)
MCHC RBC AUTO-ENTMCNC: 35.2 G/DL (ref 31–37)
MCV RBC AUTO: 89.1 FL (ref 74–97)
MONOCYTES # BLD: 0.6 K/UL (ref 0.05–1.2)
MONOCYTES NFR BLD: 7 % (ref 3–10)
NEUTS SEG # BLD: 4.4 K/UL (ref 1.8–8)
NEUTS SEG NFR BLD: 45 % (ref 40–73)
PLATELET # BLD AUTO: 397 K/UL (ref 135–420)
PMV BLD AUTO: 8.5 FL (ref 9.2–11.8)
POTASSIUM SERPL-SCNC: 4.1 MMOL/L (ref 3.5–5.5)
PROT SERPL-MCNC: 8.5 G/DL (ref 6.4–8.2)
PROTHROMBIN TIME: 37 SEC (ref 11.5–15.2)
RBC # BLD AUTO: 4.69 M/UL (ref 4.7–5.5)
SODIUM SERPL-SCNC: 128 MMOL/L (ref 136–145)
WBC # BLD AUTO: 9.4 K/UL (ref 4.6–13.2)

## 2018-08-16 PROCEDURE — 85610 PROTHROMBIN TIME: CPT | Performed by: EMERGENCY MEDICINE

## 2018-08-16 PROCEDURE — 75810000275 HC EMERGENCY DEPT VISIT NO LEVEL OF CARE

## 2018-08-16 PROCEDURE — 80307 DRUG TEST PRSMV CHEM ANLYZR: CPT | Performed by: EMERGENCY MEDICINE

## 2018-08-16 PROCEDURE — 85025 COMPLETE CBC W/AUTO DIFF WBC: CPT | Performed by: EMERGENCY MEDICINE

## 2018-08-16 PROCEDURE — 77030018836 HC SOL IRR NACL ICUM -A

## 2018-08-16 PROCEDURE — 70450 CT HEAD/BRAIN W/O DYE: CPT

## 2018-08-16 PROCEDURE — 80053 COMPREHEN METABOLIC PANEL: CPT | Performed by: EMERGENCY MEDICINE

## 2018-08-16 NOTE — ED TRIAGE NOTES
Patient arrived via EMS s/p fall from standing position. Reports hitting head on ground. Admits to ETOH consumption.

## 2018-08-16 NOTE — ED NOTES
Patient does not want to be attached to monitor. Patient wants to leave. Attempted to educate patient on the importance of being evaluated and requested patient to stay until test results, resulted. Patient refused. Patient is able to ambulate without assistance. Dr. Jorge Griffin notified. Patient refused all remaining care including cleaning of abrasions.

## 2018-08-16 NOTE — ED NOTES
Patient stated that he changed his mind and wanted wounds cleaned. Bandaid applied to head in the mean time. Patient allowed nurse to redraw labs. While nurse was getting wound care supplies patient eloped.

## 2021-10-04 NOTE — PROGRESS NOTES
Problem: Self Care Deficits Care Plan (Adult)  Goal: *Acute Goals and Plan of Care (Insert Text)  OCCUPATIONAL THERAPY SHORT TERM GOALS   Initiated 5/26/2017 and to be accomplished within 2 Week(s)    1. Patient will perform Upper body ADLs with/without adaptive equipment with supervision/set-up. 2. Patient will perform Lower body ADLs with/without adaptive equipment with minimal assistance/contact guard assist .  3. Patient will perform toileting task with minimal assistance/contact guard assist with Fair safety to reduce falls risk. 4. Patient will perform functional transfers with Myrene Anmoore and minimal assistance/contact guard assist.  5. Patient will perform standing static/dynamic balance activities for improved ADL/IADL function with minimal assistance/contact guard assist and Fair balance and safety awarenes. 6. Patient will improve Barthel index scores to atleast 55/100 to improve functional mobility. OCCUPATIONAL THERAPY LONG TERM GOALS   Initiated 5/26/2017 and to be accomplished within 3-4 Week(s)    1. Patient will perform Upper body ADLs with/without adaptive equipment with supervision/set-up. 2. Patient will perform Lower body ADLs with/without adaptive equipment with supervision/set-up . 3. Patient will perform toileting task with supervision/set-up with Good safety to reduce falls risk. 4. Patient will perform functional transfers with Rolling Walker and supervision/set-up and Good balance and safety awareness. 5. Patient will perform standing static/dynamic activity for improved ADL/IADL function with supervision/set-up an and Good balance and safety awareness. 6. Patient will improve Barthel index score to 75/100 to improve independence with mobility.     Therapist: MILTON Lewis 5/26/2017   [facility]   OCCUPATIONAL THERAPY DAILY TREATMENT NOTE        Patient: Jennifer Dueñas (16 y.o. male)                            Date: 5/31/2017  Attending Physician: Niles Mann MD  Primary Diagnosis: CELLULITIS    Treatment Diagnosis  Treatment Diagnosis: muscle weakness  Treatment Diagnosis 2: difficulty in walking   Precautions : Precautions at Admission: Fall  Vital Signs:  Vital Signs  Temp: 97.5 °F (36.4 °C)  Temp Source: Oral  Pulse (Heart Rate): 65  Resp Rate: 20  O2 Sat (%): 97 %  Level of Consciousness: Alert  BP: 92/50  MAP (Calculated): (!) 64  MEWS Score: 2     Cognitive Status:     Pain:        Gross Assessment:     Coordination:     Bed Mobility:  Bed Mobility  Supine to Sit: Supervision  Transfers:  Functional Transfers  Sit to Stand: Supervision  Toilet Transfer : Supervision  Functional Transfers  Bathroom Mobility: Supervision/set up  Toilet Transfer : Supervision  Balance:  Balance  Sitting: Intact  Standing: With support  Standing - Static: Fair (+)  Standing - Dynamic : Fair        Therapeutic Activities:  Shadowed patient with bed mobility, bathroom mobility, and toileting routine in order to assess safety and independence during routine. See above for levels of A needed. ARMSTRONG recommend patient to limit excessive turns while in bathroom for optimal safety. ARMSTRONG cleared patient to complete toileting routine with Mod I in room. ARMSTRONG also recommend patient to call for assistance if he feels unsteady during task. Patient verbalized understanding.   Patient/Caregiver Education:    PtElliott Garza Education on see above        ASSESSMENT:  Patient continues to demonstrate the need for skilled Occupational Therapy services to improve static standing balance needed for bathing  Progression toward goals:  [X]      Improving appropriately and progressing toward goals  [ ]      Improving slowly and progressing toward goals  [ ]      Not making progress toward goals and plan of care will be adjusted      Treatment session:   30 minutes     Therapist:    Marbella Cummins, [address] [place] [address],  5/31/2017 no